# Patient Record
Sex: MALE | Race: WHITE | NOT HISPANIC OR LATINO | Employment: FULL TIME | URBAN - METROPOLITAN AREA
[De-identification: names, ages, dates, MRNs, and addresses within clinical notes are randomized per-mention and may not be internally consistent; named-entity substitution may affect disease eponyms.]

---

## 2018-01-21 ENCOUNTER — APPOINTMENT (OUTPATIENT)
Dept: URGENT CARE | Facility: CLINIC | Age: 63
End: 2018-01-21
Payer: COMMERCIAL

## 2018-01-21 PROCEDURE — S9088 SERVICES PROVIDED IN URGENT: HCPCS

## 2018-01-21 PROCEDURE — 99203 OFFICE O/P NEW LOW 30 MIN: CPT

## 2018-08-14 ENCOUNTER — TRANSCRIBE ORDERS (OUTPATIENT)
Dept: ADMINISTRATIVE | Facility: HOSPITAL | Age: 63
End: 2018-08-14

## 2018-08-14 ENCOUNTER — APPOINTMENT (OUTPATIENT)
Dept: LAB | Facility: HOSPITAL | Age: 63
End: 2018-08-14
Attending: INTERNAL MEDICINE
Payer: COMMERCIAL

## 2018-08-14 DIAGNOSIS — Z79.1 ENCOUNTER FOR LONG-TERM (CURRENT) USE OF NON-STEROIDAL ANTI-INFLAMMATORIES: Primary | ICD-10-CM

## 2018-08-14 DIAGNOSIS — N42.9 DISORDER OF PROSTATE: ICD-10-CM

## 2018-08-14 DIAGNOSIS — Z79.1 ENCOUNTER FOR LONG-TERM (CURRENT) USE OF NON-STEROIDAL ANTI-INFLAMMATORIES: ICD-10-CM

## 2018-08-14 LAB
ALBUMIN SERPL BCP-MCNC: 4.1 G/DL (ref 3.5–5.7)
ALP SERPL-CCNC: 55 U/L (ref 55–165)
ALT SERPL W P-5'-P-CCNC: 20 U/L (ref 7–52)
ANION GAP SERPL CALCULATED.3IONS-SCNC: 8 MMOL/L (ref 4–13)
AST SERPL W P-5'-P-CCNC: 26 U/L (ref 13–39)
BILIRUB SERPL-MCNC: 0.7 MG/DL (ref 0.2–1)
BUN SERPL-MCNC: 23 MG/DL (ref 7–25)
CALCIUM SERPL-MCNC: 9.3 MG/DL (ref 8.6–10.5)
CHLORIDE SERPL-SCNC: 103 MMOL/L (ref 98–107)
CO2 SERPL-SCNC: 28 MMOL/L (ref 21–31)
CREAT SERPL-MCNC: 1.06 MG/DL (ref 0.7–1.3)
ERYTHROCYTE [DISTWIDTH] IN BLOOD BY AUTOMATED COUNT: 13.3 % (ref 11.6–15.1)
GFR SERPL CREATININE-BSD FRML MDRD: 73 ML/MIN/1.73SQ M
GLUCOSE SERPL-MCNC: 105 MG/DL (ref 65–140)
HCT VFR BLD AUTO: 47.6 % (ref 42–52)
HGB BLD-MCNC: 15.7 G/DL (ref 12–17)
MCH RBC QN AUTO: 30.1 PG (ref 26.8–34.3)
MCHC RBC AUTO-ENTMCNC: 33 G/DL (ref 31.4–37.4)
MCV RBC AUTO: 91 FL (ref 82–98)
PLATELET # BLD AUTO: 244 THOUSANDS/UL (ref 149–390)
PMV BLD AUTO: 8.7 FL (ref 8.9–12.7)
POTASSIUM SERPL-SCNC: 4.6 MMOL/L (ref 3.5–5.5)
PROT SERPL-MCNC: 6.3 G/DL (ref 6.4–8.9)
PSA SERPL-MCNC: 0.7 NG/ML (ref 0–4)
RBC # BLD AUTO: 5.21 MILLION/UL (ref 3.88–5.62)
SODIUM SERPL-SCNC: 139 MMOL/L (ref 134–143)
WBC # BLD AUTO: 8.2 THOUSAND/UL (ref 4.31–10.16)

## 2018-08-14 PROCEDURE — 36415 COLL VENOUS BLD VENIPUNCTURE: CPT

## 2018-08-14 PROCEDURE — G0103 PSA SCREENING: HCPCS

## 2018-08-14 PROCEDURE — 85027 COMPLETE CBC AUTOMATED: CPT

## 2018-08-14 PROCEDURE — 80053 COMPREHEN METABOLIC PANEL: CPT

## 2020-08-10 ENCOUNTER — TRANSCRIBE ORDERS (OUTPATIENT)
Dept: LAB | Facility: CLINIC | Age: 65
End: 2020-08-10

## 2020-08-10 ENCOUNTER — APPOINTMENT (OUTPATIENT)
Dept: LAB | Facility: CLINIC | Age: 65
End: 2020-08-10
Payer: MEDICARE

## 2020-08-10 DIAGNOSIS — E78.5 HYPERLIPIDEMIA, UNSPECIFIED HYPERLIPIDEMIA TYPE: ICD-10-CM

## 2020-08-10 DIAGNOSIS — N42.9 PROSTATE DISORDER: ICD-10-CM

## 2020-08-10 DIAGNOSIS — R53.83 FATIGUE, UNSPECIFIED TYPE: ICD-10-CM

## 2020-08-10 DIAGNOSIS — Z79.1 ENCOUNTER FOR LONG-TERM (CURRENT) USE OF NON-STEROIDAL ANTI-INFLAMMATORIES: Primary | ICD-10-CM

## 2020-08-10 DIAGNOSIS — Z79.1 ENCOUNTER FOR LONG-TERM (CURRENT) USE OF NON-STEROIDAL ANTI-INFLAMMATORIES: ICD-10-CM

## 2020-08-10 LAB
ALBUMIN SERPL BCP-MCNC: 3.9 G/DL (ref 3.5–5)
ALP SERPL-CCNC: 44 U/L (ref 46–116)
ALT SERPL W P-5'-P-CCNC: 20 U/L (ref 12–78)
ANION GAP SERPL CALCULATED.3IONS-SCNC: 4 MMOL/L (ref 4–13)
AST SERPL W P-5'-P-CCNC: 24 U/L (ref 5–45)
BILIRUB SERPL-MCNC: 0.76 MG/DL (ref 0.2–1)
BUN SERPL-MCNC: 28 MG/DL (ref 5–25)
CALCIUM SERPL-MCNC: 8.9 MG/DL (ref 8.3–10.1)
CHLORIDE SERPL-SCNC: 108 MMOL/L (ref 100–108)
CHOLEST SERPL-MCNC: 147 MG/DL (ref 50–200)
CO2 SERPL-SCNC: 28 MMOL/L (ref 21–32)
CREAT SERPL-MCNC: 1.28 MG/DL (ref 0.6–1.3)
ERYTHROCYTE [DISTWIDTH] IN BLOOD BY AUTOMATED COUNT: 12.9 % (ref 11.6–15.1)
GFR SERPL CREATININE-BSD FRML MDRD: 58 ML/MIN/1.73SQ M
GLUCOSE P FAST SERPL-MCNC: 103 MG/DL (ref 65–99)
HCT VFR BLD AUTO: 47.8 % (ref 36.5–49.3)
HDLC SERPL-MCNC: 66 MG/DL
HGB BLD-MCNC: 15.4 G/DL (ref 12–17)
LDLC SERPL CALC-MCNC: 66 MG/DL (ref 0–100)
MCH RBC QN AUTO: 30.1 PG (ref 26.8–34.3)
MCHC RBC AUTO-ENTMCNC: 32.2 G/DL (ref 31.4–37.4)
MCV RBC AUTO: 93 FL (ref 82–98)
NONHDLC SERPL-MCNC: 81 MG/DL
PLATELET # BLD AUTO: 237 THOUSANDS/UL (ref 149–390)
PMV BLD AUTO: 10.5 FL (ref 8.9–12.7)
POTASSIUM SERPL-SCNC: 4.3 MMOL/L (ref 3.5–5.3)
PROT SERPL-MCNC: 7.2 G/DL (ref 6.4–8.2)
PSA SERPL-MCNC: 0.3 NG/ML (ref 0–4)
RBC # BLD AUTO: 5.12 MILLION/UL (ref 3.88–5.62)
SODIUM SERPL-SCNC: 140 MMOL/L (ref 136–145)
T4 FREE SERPL-MCNC: 1.15 NG/DL (ref 0.76–1.46)
TRIGL SERPL-MCNC: 75 MG/DL
TSH SERPL DL<=0.05 MIU/L-ACNC: 1.42 UIU/ML (ref 0.36–3.74)
WBC # BLD AUTO: 9.46 THOUSAND/UL (ref 4.31–10.16)

## 2020-08-10 PROCEDURE — 80053 COMPREHEN METABOLIC PANEL: CPT

## 2020-08-10 PROCEDURE — 85027 COMPLETE CBC AUTOMATED: CPT

## 2020-08-10 PROCEDURE — 84443 ASSAY THYROID STIM HORMONE: CPT

## 2020-08-10 PROCEDURE — 84153 ASSAY OF PSA TOTAL: CPT

## 2020-08-10 PROCEDURE — 36415 COLL VENOUS BLD VENIPUNCTURE: CPT

## 2020-08-10 PROCEDURE — 80061 LIPID PANEL: CPT

## 2020-08-10 PROCEDURE — 84439 ASSAY OF FREE THYROXINE: CPT

## 2020-12-07 ENCOUNTER — OFFICE VISIT (OUTPATIENT)
Dept: URGENT CARE | Facility: CLINIC | Age: 65
End: 2020-12-07
Payer: MEDICARE

## 2020-12-07 VITALS
WEIGHT: 160 LBS | HEART RATE: 62 BPM | BODY MASS INDEX: 24.25 KG/M2 | RESPIRATION RATE: 18 BRPM | TEMPERATURE: 97.4 F | HEIGHT: 68 IN | OXYGEN SATURATION: 98 %

## 2020-12-07 DIAGNOSIS — R53.83 FATIGUE, UNSPECIFIED TYPE: Primary | ICD-10-CM

## 2020-12-07 PROCEDURE — 99213 OFFICE O/P EST LOW 20 MIN: CPT | Performed by: PHYSICIAN ASSISTANT

## 2020-12-07 PROCEDURE — G0463 HOSPITAL OUTPT CLINIC VISIT: HCPCS | Performed by: PHYSICIAN ASSISTANT

## 2020-12-07 PROCEDURE — 87637 SARSCOV2&INF A&B&RSV AMP PRB: CPT | Performed by: PHYSICIAN ASSISTANT

## 2020-12-07 RX ORDER — DEXTROAMPHETAMINE SACCHARATE, AMPHETAMINE ASPARTATE, DEXTROAMPHETAMINE SULFATE AND AMPHETAMINE SULFATE 7.5; 7.5; 7.5; 7.5 MG/1; MG/1; MG/1; MG/1
20 TABLET ORAL 2 TIMES DAILY
COMMUNITY
End: 2021-10-14

## 2020-12-07 RX ORDER — VALACYCLOVIR HYDROCHLORIDE 1 G/1
1 TABLET, FILM COATED ORAL 3 TIMES DAILY
COMMUNITY
Start: 2018-01-21 | End: 2021-04-23

## 2020-12-09 LAB
FLUAV RNA NPH QL NAA+PROBE: NOT DETECTED
FLUBV RNA NPH QL NAA+PROBE: NOT DETECTED
RSV RNA NPH QL NAA+PROBE: NOT DETECTED
SARS-COV-2 RNA NPH QL NAA+PROBE: DETECTED

## 2021-01-22 ENCOUNTER — TRANSCRIBE ORDERS (OUTPATIENT)
Dept: LAB | Facility: CLINIC | Age: 66
End: 2021-01-22

## 2021-01-22 ENCOUNTER — LAB (OUTPATIENT)
Dept: LAB | Facility: CLINIC | Age: 66
End: 2021-01-22
Payer: MEDICARE

## 2021-01-22 DIAGNOSIS — N28.9 KIDNEY DISEASE: Primary | ICD-10-CM

## 2021-01-22 DIAGNOSIS — N28.9 KIDNEY DISEASE: ICD-10-CM

## 2021-01-22 LAB
ANION GAP SERPL CALCULATED.3IONS-SCNC: 2 MMOL/L (ref 4–13)
BUN SERPL-MCNC: 33 MG/DL (ref 5–25)
CALCIUM SERPL-MCNC: 9.1 MG/DL (ref 8.3–10.1)
CHLORIDE SERPL-SCNC: 104 MMOL/L (ref 100–108)
CO2 SERPL-SCNC: 32 MMOL/L (ref 21–32)
CREAT SERPL-MCNC: 1.85 MG/DL (ref 0.6–1.3)
GFR SERPL CREATININE-BSD FRML MDRD: 37 ML/MIN/1.73SQ M
GLUCOSE SERPL-MCNC: 72 MG/DL (ref 65–140)
POTASSIUM SERPL-SCNC: 4.3 MMOL/L (ref 3.5–5.3)
SODIUM SERPL-SCNC: 138 MMOL/L (ref 136–145)

## 2021-01-22 PROCEDURE — 80048 BASIC METABOLIC PNL TOTAL CA: CPT

## 2021-01-22 PROCEDURE — 36415 COLL VENOUS BLD VENIPUNCTURE: CPT

## 2021-02-25 ENCOUNTER — TRANSCRIBE ORDERS (OUTPATIENT)
Dept: ADMINISTRATIVE | Facility: HOSPITAL | Age: 66
End: 2021-02-25

## 2021-02-25 DIAGNOSIS — N17.9 ACUTE RENAL FAILURE (HCC): Primary | ICD-10-CM

## 2021-02-26 ENCOUNTER — CONSULT (OUTPATIENT)
Dept: NEPHROLOGY | Facility: CLINIC | Age: 66
End: 2021-02-26
Payer: MEDICARE

## 2021-02-26 ENCOUNTER — HOSPITAL ENCOUNTER (OUTPATIENT)
Dept: ULTRASOUND IMAGING | Facility: HOSPITAL | Age: 66
Discharge: HOME | End: 2021-02-26
Attending: INTERNAL MEDICINE
Payer: MEDICARE

## 2021-02-26 VITALS
HEART RATE: 66 BPM | BODY MASS INDEX: 24.25 KG/M2 | HEIGHT: 68 IN | DIASTOLIC BLOOD PRESSURE: 84 MMHG | SYSTOLIC BLOOD PRESSURE: 154 MMHG | WEIGHT: 160 LBS | OXYGEN SATURATION: 98 %

## 2021-02-26 DIAGNOSIS — N17.9 ACUTE RENAL FAILURE (HCC): ICD-10-CM

## 2021-02-26 DIAGNOSIS — R63.5 WEIGHT GAIN: ICD-10-CM

## 2021-02-26 DIAGNOSIS — N18.2 STAGE 2 CHRONIC KIDNEY DISEASE: ICD-10-CM

## 2021-02-26 DIAGNOSIS — N17.9 ACUTE RENAL FAILURE, UNSPECIFIED ACUTE RENAL FAILURE TYPE (HCC): Primary | ICD-10-CM

## 2021-02-26 PROCEDURE — 99214 OFFICE O/P EST MOD 30 MIN: CPT | Performed by: INTERNAL MEDICINE

## 2021-02-26 PROCEDURE — 76770 US EXAM ABDO BACK WALL COMP: CPT

## 2021-02-26 NOTE — ASSESSMENT & PLAN NOTE
Lab Results   Component Value Date    EGFR 37 01/22/2021    EGFR 58 08/10/2020    EGFR 73 08/14/2018    CREATININE 1 85 (H) 01/22/2021    CREATININE 1 28 08/10/2020    CREATININE 1 06 08/14/2018   He had a  progressive rise in creatinine over the past 2 years, however, he was taking  Amino acid supplements and creatine at that time as well as a high protein diet

## 2021-02-26 NOTE — ASSESSMENT & PLAN NOTE
He had a rise in his creatinine and drop in GFR since last year  He says he was not taking cratine supplements  He drinks probably too much water yet his urine  Has been darker for the lst 2 days  He is on Adderall which can constrict renal vessels     Will check a kidney ultrasound and bladder ultrasound   A PSA has been ordered  Will check urine protein studies and a UA   Check a urine osmolality in view of high water intake to see if he appropriately dilutes his urine He will stop all supplements and avoid creatinine and amino acids at this time

## 2021-03-02 ENCOUNTER — TELEPHONE (OUTPATIENT)
Dept: UROLOGY | Facility: MEDICAL CENTER | Age: 66
End: 2021-03-02

## 2021-03-02 NOTE — TELEPHONE ENCOUNTER
Call placed to Xiomara Raymundo at Dr Ayan [facility] office and spoke with her  Offered an appointment today at 11am with Dr Lior Raymundo accepted this appointment and will contact the patient to inform him

## 2021-03-02 NOTE — TELEPHONE ENCOUNTER
Aston Beltran from [facility] office called back and stated patient declined appointment   Canceled per request   fyi

## 2021-03-02 NOTE — TELEPHONE ENCOUNTER
NPT Please Triage    Ava from Dr Oralia Jimenez office calling to get a STAT appointment for patient  Please triage  Patient needs appointment for  Moderate bilateral hydronephrosis with significant post void residual urinary bladder volume of 978 5 mL    Please review ultrasound

## 2021-03-04 ENCOUNTER — TRANSCRIBE ORDERS (OUTPATIENT)
Dept: ADMINISTRATIVE | Facility: HOSPITAL | Age: 66
End: 2021-03-04

## 2021-03-04 ENCOUNTER — LAB REQUISITION (OUTPATIENT)
Dept: LAB | Facility: HOSPITAL | Age: 66
End: 2021-03-04
Payer: MEDICARE

## 2021-03-04 DIAGNOSIS — N13.30 UNSPECIFIED HYDRONEPHROSIS: Primary | ICD-10-CM

## 2021-03-04 DIAGNOSIS — N39.0 URINARY TRACT INFECTION, SITE NOT SPECIFIED: ICD-10-CM

## 2021-03-04 PROCEDURE — 81001 URINALYSIS AUTO W/SCOPE: CPT | Performed by: UROLOGY

## 2021-03-04 PROCEDURE — 87086 URINE CULTURE/COLONY COUNT: CPT | Performed by: UROLOGY

## 2021-03-05 ENCOUNTER — HOSPITAL ENCOUNTER (EMERGENCY)
Facility: HOSPITAL | Age: 66
Discharge: HOME | End: 2021-03-05
Attending: EMERGENCY MEDICINE | Admitting: EMERGENCY MEDICINE
Payer: MEDICARE

## 2021-03-05 VITALS
TEMPERATURE: 98 F | HEIGHT: 68 IN | WEIGHT: 160 LBS | SYSTOLIC BLOOD PRESSURE: 164 MMHG | HEART RATE: 98 BPM | DIASTOLIC BLOOD PRESSURE: 98 MMHG | RESPIRATION RATE: 18 BRPM | OXYGEN SATURATION: 99 % | BODY MASS INDEX: 24.25 KG/M2

## 2021-03-05 DIAGNOSIS — T83.9XXA PROBLEM WITH FOLEY CATHETER, INITIAL ENCOUNTER (HCC): Primary | ICD-10-CM

## 2021-03-05 LAB
ANION GAP SERPL CALCULATED.3IONS-SCNC: 8 MMOL/L (ref 4–13)
BACTERIA UR QL AUTO: ABNORMAL /HPF
BILIRUB UR QL STRIP: NEGATIVE
BUN SERPL-MCNC: 12 MG/DL (ref 7–25)
CALCIUM SERPL-MCNC: 10 MG/DL (ref 8.6–10.5)
CHLORIDE SERPL-SCNC: 97 MMOL/L (ref 98–107)
CLARITY UR: ABNORMAL
CO2 SERPL-SCNC: 27 MMOL/L (ref 21–31)
COLOR UR: ABNORMAL
CREAT SERPL-MCNC: 1.91 MG/DL (ref 0.7–1.3)
GFR SERPL CREATININE-BSD FRML MDRD: 35 ML/MIN/1.73SQ M
GLUCOSE SERPL-MCNC: 112 MG/DL (ref 65–99)
GLUCOSE UR STRIP-MCNC: NEGATIVE MG/DL
HGB UR QL STRIP.AUTO: ABNORMAL
KETONES UR STRIP-MCNC: NEGATIVE MG/DL
LEUKOCYTE ESTERASE UR QL STRIP: ABNORMAL
NITRITE UR QL STRIP: NEGATIVE
NON-SQ EPI CELLS URNS QL MICRO: ABNORMAL /HPF
PH UR STRIP.AUTO: 6.5 [PH]
POTASSIUM SERPL-SCNC: 4 MMOL/L (ref 3.5–5.5)
PROT UR STRIP-MCNC: ABNORMAL MG/DL
RBC #/AREA URNS AUTO: ABNORMAL /HPF
SODIUM SERPL-SCNC: 132 MMOL/L (ref 134–143)
SP GR UR STRIP.AUTO: 1.01 (ref 1–1.03)
UROBILINOGEN UR QL STRIP.AUTO: 0.2 E.U./DL
WBC #/AREA URNS AUTO: ABNORMAL /HPF

## 2021-03-05 PROCEDURE — 80048 BASIC METABOLIC PNL TOTAL CA: CPT | Performed by: PHYSICIAN ASSISTANT

## 2021-03-05 PROCEDURE — 84154 ASSAY OF PSA FREE: CPT | Performed by: PHYSICIAN ASSISTANT

## 2021-03-05 PROCEDURE — 99283 EMERGENCY DEPT VISIT LOW MDM: CPT

## 2021-03-05 PROCEDURE — 99284 EMERGENCY DEPT VISIT MOD MDM: CPT | Performed by: PHYSICIAN ASSISTANT

## 2021-03-05 PROCEDURE — 36415 COLL VENOUS BLD VENIPUNCTURE: CPT | Performed by: PHYSICIAN ASSISTANT

## 2021-03-05 PROCEDURE — 84153 ASSAY OF PSA TOTAL: CPT | Performed by: PHYSICIAN ASSISTANT

## 2021-03-05 RX ORDER — ATROPA BELLADONNA AND OPIUM 16.2; 3 MG/1; MG/1
30 SUPPOSITORY RECTAL EVERY 8 HOURS PRN
Status: DISCONTINUED | OUTPATIENT
Start: 2021-03-05 | End: 2021-03-05 | Stop reason: HOSPADM

## 2021-03-05 RX ORDER — OXYBUTYNIN CHLORIDE 5 MG/1
15 TABLET, EXTENDED RELEASE ORAL DAILY
Status: DISCONTINUED | OUTPATIENT
Start: 2021-03-05 | End: 2021-03-05 | Stop reason: HOSPADM

## 2021-03-05 RX ORDER — OXYBUTYNIN CHLORIDE 15 MG/1
15 TABLET, EXTENDED RELEASE ORAL
Qty: 14 TABLET | Refills: 0 | Status: SHIPPED | OUTPATIENT
Start: 2021-03-05 | End: 2021-04-23

## 2021-03-05 RX ADMIN — ATROPA BELLADONNA AND OPIUM 1 SUPPOSITORY: 16.2; 3 SUPPOSITORY RECTAL at 11:34

## 2021-03-05 RX ADMIN — OXYBUTYNIN 15 MG: 5 TABLET, FILM COATED, EXTENDED RELEASE ORAL at 11:34

## 2021-03-05 NOTE — ED ATTENDING ATTESTATION
3/5/2021  IOswaldo III, DO, saw and evaluated the patient  I have discussed the patient with the resident/non-physician practitioner and agree with the resident's/non-physician practitioner's findings, Plan of Care, and MDM as documented in the resident's/non-physician practitioner's note, except where noted  All available labs and Radiology studies were reviewed  I was present for key portions of any procedure(s) performed by the resident/non-physician practitioner and I was immediately available to provide assistance  At this point I agree with the current assessment done in the Emergency Department  I have conducted an independent evaluation of this patient a history and physical is as follows:    ED Course         Critical Care Time  Procedures    Please see ED course for specifics for the physician assistant entry, this is a 78-year-old gentle presents the emergency department with a catheter that was placed yesterday secondary to enlarged prostate  Patient was having significant amount of pain and he called his urologist Dr Rona Olvera and instructed him to come the emergency department  Physician assistant discussed the case at length with Dr Aleida Rojas the patient should not have the catheter removed because he has an enlarged prostate and history of renal disease in taking the catheter out it may precipitate acute renal failure  Patient was adamant about catheter to be removed, patient eventually agreed to admit follow-up with Urology  Agree the patient will have a [email or web address] suppository insert prior to discharge, patient will have his  come and pick him up in the hospital   Follow-up with urology as directed

## 2021-03-05 NOTE — ED PROVIDER NOTES
History  Chief Complaint   Patient presents with    Urinary Catheter Problem     had catheter placed d/t enlarged prostate yesterday , now having pain and blood , states Dr Rona Olvera told him to come in, pt would like it removed        57-year-old male presents emergency department with concern for discomfort associated with his recently placed urinary catheter  Urinary catheter was replaced by his urologist due to significant urinary retention and bilateral hydronephrosis with evidence of renal injury  Patient reports that the catheter is very uncomfortable causing him inability to sit in his chair home and to not be able to sleep he is requesting the catheter be removed  There is some hematuria noted in the Ordonez bag however this is likely normal given recent Ordonez placement  Allergies reviewed          Prior to Admission Medications   Prescriptions Last Dose Informant Patient Reported? Taking? amphetamine-dextroamphetamine (ADDERALL, 30MG,) 30 MG tablet 3/5/2021 at Unknown time  Yes Yes   Sig: Take 10 mg by mouth 10mg tablets TID   valACYclovir (VALTREX) 1,000 mg tablet Not Taking at Unknown time  Yes No   Sig: Take 1 tablet by mouth 3 (three) times a day      Facility-Administered Medications: None       Past Medical History:   Diagnosis Date    Renal disorder        History reviewed  No pertinent surgical history  History reviewed  No pertinent family history  I have reviewed and agree with the history as documented  E-Cigarette/Vaping     E-Cigarette/Vaping Substances     Social History     Tobacco Use    Smoking status: Former Smoker    Smokeless tobacco: Never Used   Substance Use Topics    Alcohol use: Never     Frequency: Never    Drug use: Never       Review of Systems   Constitutional: Negative for chills, fatigue and fever  HENT: Negative for congestion, ear pain, rhinorrhea, sinus pressure, sneezing and sore throat  Eyes: Negative for pain and discharge     Respiratory: Negative for cough, choking, chest tightness, shortness of breath and wheezing  Cardiovascular: Negative for chest pain and palpitations  Gastrointestinal: Negative for abdominal pain, constipation, diarrhea, nausea and vomiting  Genitourinary: Negative for difficulty urinating and dysuria  Musculoskeletal: Negative for back pain, gait problem, neck pain and neck stiffness  Neurological: Negative for dizziness, light-headedness and headaches  All other systems reviewed and are negative  Physical Exam  Physical Exam  Vitals signs and nursing note reviewed  Constitutional:       General: He is not in acute distress  Appearance: Normal appearance  He is well-developed  He is not ill-appearing  HENT:      Head: Normocephalic and atraumatic  Right Ear: External ear normal       Left Ear: External ear normal       Nose: Nose normal    Eyes:      Pupils: Pupils are equal, round, and reactive to light  Neck:      Musculoskeletal: Normal range of motion and neck supple  Cardiovascular:      Rate and Rhythm: Normal rate and regular rhythm  Heart sounds: Normal heart sounds  No murmur  No friction rub  No gallop  Pulmonary:      Effort: Pulmonary effort is normal  No respiratory distress  Breath sounds: Normal breath sounds  No stridor  No wheezing or rales  Abdominal:      General: Bowel sounds are normal  There is no distension  Palpations: Abdomen is soft  Tenderness: There is no abdominal tenderness  There is no guarding  Genitourinary:     Penis: Normal and circumcised  Comments:  Ordonez catheter in place  Hematuria noted  Musculoskeletal: Normal range of motion  General: No tenderness  Skin:     General: Skin is warm  Capillary Refill: Capillary refill takes less than 2 seconds  Neurological:      Mental Status: He is alert and oriented to person, place, and time  Psychiatric:         Behavior: Behavior is cooperative           Vital Signs  ED Triage Vitals [03/05/21 0943]   Temperature Pulse Respirations Blood Pressure SpO2   98 °F (36 7 °C) 98 18 164/98 99 %      Temp src Heart Rate Source Patient Position - Orthostatic VS BP Location FiO2 (%)   -- Monitor -- -- --      Pain Score       9           Vitals:    03/05/21 0943   BP: 164/98   Pulse: 98         Visual Acuity      ED Medications  Medications - No data to display    Diagnostic Studies  Results Reviewed     Procedure Component Value Units Date/Time    Basic metabolic panel [[id number]]  (Abnormal) Collected: 03/05/21 1132    Lab Status: Final result Specimen: Blood from Arm, Right Updated: 03/05/21 1156     Sodium 132 mmol/L      Potassium 4 0 mmol/L      Chloride 97 mmol/L      CO2 27 mmol/L      ANION GAP 8 mmol/L      BUN 12 mg/dL      Creatinine 1 91 mg/dL      Glucose 112 mg/dL      Calcium 10 0 mg/dL      eGFR 35 ml/min/1 73sq m     Narrative:      [place] guidelines for Chronic Kidney Disease (CKD):     Stage 1 with normal or high GFR (GFR > 90 mL/min/1 73 square meters)    Stage 2 Mild CKD (GFR = 60-89 mL/min/1 73 square meters)    Stage 3A Moderate CKD (GFR = 45-59 mL/min/1 73 square meters)    Stage 3B Moderate CKD (GFR = 30-44 mL/min/1 73 square meters)    Stage 4 Severe CKD (GFR = 15-29 mL/min/1 73 square meters)    Stage 5 End Stage CKD (GFR <15 mL/min/1 73 square meters)  Note: GFR calculation is accurate only with a steady state creatinine    PSA, total and free [[id number]] Collected: 03/05/21 1133    Lab Status:  In process Specimen: Blood from Arm, Right Updated: 03/05/21 1134                 No orders to display              Procedures  Procedures         ED Course                                           MDM  Number of Diagnoses or Management Options  Problem with Ordonez catheter, initial encounter [facility]):   Diagnosis management comments:  Case was discussed with Dr Seldon Collet of Urology who also  Spoke with the patient while he was in the emergency department via telephone  Patient agreeable to maintain Ordonez catheter placement  Patient given B and O suppository as well as oxybutynin per Dr Rowell January request   Lab sent per Dr Rowell January request as well  Patient is following with Urology closely regarding his renal failure and urinary retention  Patient educated regarding their diagnosis and given return and follow-up instructions  Patient was advised to returned to the ED with worsening symptoms or concerns  Patient is understanding of and in agreement with the treatment plan  There are no questions at the time of discharge  Amount and/or Complexity of Data Reviewed  Clinical lab tests: ordered and reviewed    Risk of Complications, Morbidity, and/or Mortality  Presenting problems: moderate  Diagnostic procedures: low  Management options: low    Patient Progress  Patient progress: stable      Disposition  Final diagnoses:   Problem with Ordonez catheter, initial encounter [facility])     Time reflects when diagnosis was documented in both MDM as applicable and the Disposition within this note     Time User Action Codes Description Comment    3/5/2021 11:38 AM Indra Hughes 469  9XXA] Problem with Ordonez catheter, initial encounter [facility])       ED Disposition     ED Disposition Condition Date/Time Comment    Discharge Stable Fri Mar 5, 2021 11:38 AM Leonora Perez discharge to home/self care              Follow-up Information     Follow up With Specialties Details Why Jose Trimble MD Urology   [address]   [phone]            Discharge Medication List as of 3/5/2021 12:07 PM      START taking these medications    Details   oxybutynin (DITROPAN XL) 15 MG 24 hr tablet Take 1 tablet (15 mg total) by mouth daily at bedtime, Starting Fri 3/5/2021, Normal         CONTINUE these medications which have NOT CHANGED    Details   amphetamine-dextroamphetamine (ADDERALL, 30MG,) 30 MG tablet Take 10 mg by mouth 10mg tablets TID, Historical Med      valACYclovir (VALTREX) 1,000 mg tablet Take 1 tablet by mouth 3 (three) times a day, Starting Sun 1/21/2018, Historical Med           No discharge procedures on file      PDMP Review     None          ED Provider  Electronically Signed by           April Hendrickson PA-C  03/05/21 9189

## 2021-03-07 LAB
BACTERIA UR CULT: ABNORMAL
PSA FREE MFR SERPL: 10 %
PSA FREE SERPL-MCNC: 0.03 NG/ML
PSA SERPL-MCNC: 0.3 NG/ML (ref 0–4)

## 2021-03-08 ENCOUNTER — TRANSCRIBE ORDERS (OUTPATIENT)
Dept: LAB | Facility: CLINIC | Age: 66
End: 2021-03-08

## 2021-03-08 ENCOUNTER — LAB (OUTPATIENT)
Dept: LAB | Facility: CLINIC | Age: 66
End: 2021-03-08
Payer: MEDICARE

## 2021-03-08 DIAGNOSIS — R33.9 RETENTION OF URINE, UNSPECIFIED: ICD-10-CM

## 2021-03-08 DIAGNOSIS — N13.8 ENLARGED PROSTATE WITH URINARY OBSTRUCTION: ICD-10-CM

## 2021-03-08 DIAGNOSIS — N17.9 ACUTE RENAL FAILURE, UNSPECIFIED ACUTE RENAL FAILURE TYPE (HCC): ICD-10-CM

## 2021-03-08 DIAGNOSIS — N18.2 STAGE 2 CHRONIC KIDNEY DISEASE: ICD-10-CM

## 2021-03-08 DIAGNOSIS — R35.89 POLYURIA: ICD-10-CM

## 2021-03-08 DIAGNOSIS — R63.5 WEIGHT GAIN: ICD-10-CM

## 2021-03-08 DIAGNOSIS — N40.1 ENLARGED PROSTATE WITH URINARY OBSTRUCTION: ICD-10-CM

## 2021-03-08 DIAGNOSIS — R35.89 POLYURIA: Primary | ICD-10-CM

## 2021-03-08 LAB
ALBUMIN SERPL BCP-MCNC: 3.3 G/DL (ref 3.5–5)
ALP SERPL-CCNC: 68 U/L (ref 46–116)
ALT SERPL W P-5'-P-CCNC: 17 U/L (ref 12–78)
ANION GAP SERPL CALCULATED.3IONS-SCNC: 6 MMOL/L (ref 4–13)
AST SERPL W P-5'-P-CCNC: 21 U/L (ref 5–45)
BACTERIA UR QL AUTO: ABNORMAL /HPF
BASOPHILS # BLD AUTO: 0.08 THOUSANDS/ΜL (ref 0–0.1)
BASOPHILS NFR BLD AUTO: 1 % (ref 0–1)
BILIRUB SERPL-MCNC: 0.38 MG/DL (ref 0.2–1)
BILIRUB UR QL STRIP: NEGATIVE
BUN SERPL-MCNC: 21 MG/DL (ref 5–25)
CALCIUM ALBUM COR SERPL-MCNC: 9.4 MG/DL (ref 8.3–10.1)
CALCIUM SERPL-MCNC: 8.8 MG/DL (ref 8.3–10.1)
CHLORIDE SERPL-SCNC: 99 MMOL/L (ref 100–108)
CLARITY UR: ABNORMAL
CO2 SERPL-SCNC: 31 MMOL/L (ref 21–32)
COLOR UR: ABNORMAL
CREAT SERPL-MCNC: 2 MG/DL (ref 0.6–1.3)
CREAT UR-MCNC: 133 MG/DL
EOSINOPHIL # BLD AUTO: 0.35 THOUSAND/ΜL (ref 0–0.61)
EOSINOPHIL NFR BLD AUTO: 4 % (ref 0–6)
ERYTHROCYTE [DISTWIDTH] IN BLOOD BY AUTOMATED COUNT: 13 % (ref 11.6–15.1)
GFR SERPL CREATININE-BSD FRML MDRD: 34 ML/MIN/1.73SQ M
GLUCOSE SERPL-MCNC: 113 MG/DL (ref 65–140)
GLUCOSE UR STRIP-MCNC: NEGATIVE MG/DL
HCT VFR BLD AUTO: 44.5 % (ref 36.5–49.3)
HGB BLD-MCNC: 14.3 G/DL (ref 12–17)
HGB UR QL STRIP.AUTO: ABNORMAL
IMM GRANULOCYTES # BLD AUTO: 0.04 THOUSAND/UL (ref 0–0.2)
IMM GRANULOCYTES NFR BLD AUTO: 0 % (ref 0–2)
KETONES UR STRIP-MCNC: ABNORMAL MG/DL
LEUKOCYTE ESTERASE UR QL STRIP: ABNORMAL
LYMPHOCYTES # BLD AUTO: 1.55 THOUSANDS/ΜL (ref 0.6–4.47)
LYMPHOCYTES NFR BLD AUTO: 16 % (ref 14–44)
MCH RBC QN AUTO: 28.7 PG (ref 26.8–34.3)
MCHC RBC AUTO-ENTMCNC: 32.1 G/DL (ref 31.4–37.4)
MCV RBC AUTO: 89 FL (ref 82–98)
MICROALBUMIN UR-MCNC: 2090 MG/L (ref 0–20)
MICROALBUMIN/CREAT 24H UR: 1571 MG/G CREATININE (ref 0–30)
MONOCYTES # BLD AUTO: 0.46 THOUSAND/ΜL (ref 0.17–1.22)
MONOCYTES NFR BLD AUTO: 5 % (ref 4–12)
NEUTROPHILS # BLD AUTO: 7.41 THOUSANDS/ΜL (ref 1.85–7.62)
NEUTS SEG NFR BLD AUTO: 74 % (ref 43–75)
NITRITE UR QL STRIP: NEGATIVE
NON-SQ EPI CELLS URNS QL MICRO: ABNORMAL /HPF
NRBC BLD AUTO-RTO: 0 /100 WBCS
OSMOLALITY UR: 378 MMOL/KG
PH UR STRIP.AUTO: 6 [PH]
PLATELET # BLD AUTO: 385 THOUSANDS/UL (ref 149–390)
PMV BLD AUTO: 10.5 FL (ref 8.9–12.7)
POTASSIUM SERPL-SCNC: 4.2 MMOL/L (ref 3.5–5.3)
PROT SERPL-MCNC: 7.5 G/DL (ref 6.4–8.2)
PROT UR STRIP-MCNC: ABNORMAL MG/DL
PROT UR-MCNC: 338 MG/DL
PROT/CREAT UR: 2.54 MG/G{CREAT} (ref 0–0.1)
RBC # BLD AUTO: 4.99 MILLION/UL (ref 3.88–5.62)
RBC #/AREA URNS AUTO: ABNORMAL /HPF
SODIUM 24H UR-SCNC: 14 MOL/L
SODIUM SERPL-SCNC: 136 MMOL/L (ref 136–145)
SP GR UR STRIP.AUTO: 1.01 (ref 1–1.03)
T4 FREE SERPL-MCNC: 1.29 NG/DL (ref 0.76–1.46)
TSH SERPL DL<=0.05 MIU/L-ACNC: 2.56 UIU/ML (ref 0.36–3.74)
URATE SERPL-MCNC: 9.6 MG/DL (ref 4.2–8)
UROBILINOGEN UR QL STRIP.AUTO: 1 E.U./DL
WBC # BLD AUTO: 9.89 THOUSAND/UL (ref 4.31–10.16)
WBC #/AREA URNS AUTO: ABNORMAL /HPF

## 2021-03-08 PROCEDURE — 83935 ASSAY OF URINE OSMOLALITY: CPT | Performed by: INTERNAL MEDICINE

## 2021-03-08 PROCEDURE — 80053 COMPREHEN METABOLIC PANEL: CPT

## 2021-03-08 PROCEDURE — 82570 ASSAY OF URINE CREATININE: CPT | Performed by: INTERNAL MEDICINE

## 2021-03-08 PROCEDURE — 84300 ASSAY OF URINE SODIUM: CPT

## 2021-03-08 PROCEDURE — 81001 URINALYSIS AUTO W/SCOPE: CPT

## 2021-03-08 PROCEDURE — 82043 UR ALBUMIN QUANTITATIVE: CPT | Performed by: INTERNAL MEDICINE

## 2021-03-08 PROCEDURE — 84439 ASSAY OF FREE THYROXINE: CPT

## 2021-03-08 PROCEDURE — 84153 ASSAY OF PSA TOTAL: CPT

## 2021-03-08 PROCEDURE — 84443 ASSAY THYROID STIM HORMONE: CPT

## 2021-03-08 PROCEDURE — 84156 ASSAY OF PROTEIN URINE: CPT

## 2021-03-08 PROCEDURE — 84154 ASSAY OF PSA FREE: CPT

## 2021-03-08 PROCEDURE — 36415 COLL VENOUS BLD VENIPUNCTURE: CPT

## 2021-03-08 PROCEDURE — 85025 COMPLETE CBC W/AUTO DIFF WBC: CPT

## 2021-03-08 PROCEDURE — 84550 ASSAY OF BLOOD/URIC ACID: CPT

## 2021-03-08 PROCEDURE — 82570 ASSAY OF URINE CREATININE: CPT

## 2021-03-09 LAB
PSA FREE MFR SERPL: <6.7 %
PSA FREE SERPL-MCNC: <0.02 NG/ML
PSA SERPL-MCNC: 0.3 NG/ML (ref 0–4)

## 2021-03-11 ENCOUNTER — HOSPITAL ENCOUNTER (OUTPATIENT)
Dept: ULTRASOUND IMAGING | Facility: HOSPITAL | Age: 66
Discharge: HOME | End: 2021-03-11
Attending: UROLOGY
Payer: MEDICARE

## 2021-03-11 DIAGNOSIS — N13.30 UNSPECIFIED HYDRONEPHROSIS: ICD-10-CM

## 2021-03-11 PROCEDURE — 76770 US EXAM ABDO BACK WALL COMP: CPT

## 2021-03-12 ENCOUNTER — OFFICE VISIT (OUTPATIENT)
Dept: NEPHROLOGY | Facility: CLINIC | Age: 66
End: 2021-03-12
Payer: MEDICARE

## 2021-03-12 VITALS
DIASTOLIC BLOOD PRESSURE: 84 MMHG | OXYGEN SATURATION: 99 % | WEIGHT: 160 LBS | BODY MASS INDEX: 24.25 KG/M2 | SYSTOLIC BLOOD PRESSURE: 144 MMHG | HEIGHT: 68 IN | HEART RATE: 84 BPM

## 2021-03-12 DIAGNOSIS — N17.0 ACUTE RENAL FAILURE WITH TUBULAR NECROSIS (HCC): ICD-10-CM

## 2021-03-12 DIAGNOSIS — R80.1 PERSISTENT PROTEINURIA: ICD-10-CM

## 2021-03-12 DIAGNOSIS — N18.2 STAGE 2 CHRONIC KIDNEY DISEASE: ICD-10-CM

## 2021-03-12 DIAGNOSIS — N17.9 ACUTE RENAL FAILURE, UNSPECIFIED ACUTE RENAL FAILURE TYPE (HCC): Primary | ICD-10-CM

## 2021-03-12 DIAGNOSIS — N13.30 HYDRONEPHROSIS, UNSPECIFIED HYDRONEPHROSIS TYPE: ICD-10-CM

## 2021-03-12 PROCEDURE — 99214 OFFICE O/P EST MOD 30 MIN: CPT | Performed by: INTERNAL MEDICINE

## 2021-03-12 RX ORDER — TAMSULOSIN HYDROCHLORIDE 0.4 MG/1
0.4 CAPSULE ORAL
COMMUNITY
Start: 2021-03-01

## 2021-03-12 NOTE — ASSESSMENT & PLAN NOTE
He had marked bilateral hydronephrosis most probablyly due to prostatic enlargement  He had a Ordonez inserted and he had marked drainage  He had a day a prompt diuresis which appears to be more of a postobstructive diuresis and he feels well  He is continuing to utilize a Ordonez  I explained with a neurogenic bladder was and how port was for him to maintain Ordonez drainage  Will check kidney function 2-4 weeks after drainage is complete to see through to resolution  If his kidney function improves then etiology is obstructive uropathy  I explained using a diagram the source of glomerular proteinuria as well as tubular proteinuria  He has both  If his kidney function does not improve to baseline after complete resolution of hydronephrosis would consider a kidney biopsy if  his creatinine continues to stay elevated    I explained the technique of this and he understood

## 2021-03-12 NOTE — PROGRESS NOTES
[facility] [place], [place]    Name: Farzana Ambrocio  YOB: 1955      Assessment/Plan:       Problem List Items Addressed This Visit        Genitourinary    Acute renal failure with tubular necrosis [facility]) - Primary     He had marked bilateral hydronephrosis most probablyly due to prostatic enlargement  He had a Ordonez inserted and he had marked drainage  He had a day a prompt diuresis which appears to be more of a postobstructive diuresis and he feels well  He is continuing to utilize a Ordonez  I explained with a neurogenic bladder was and how port was for him to maintain Ordonez drainage  Will check kidney function 2-4 weeks after drainage is complete to see through to resolution  If his kidney function improves then etiology is obstructive uropathy  I explained using a diagram the source of glomerular proteinuria as well as tubular proteinuria  He has both  If his kidney function does not improve to baseline after complete resolution of hydronephrosis would consider a kidney biopsy if  his creatinine continues to stay elevated  I explained the technique of this and he understood         Stage 2 chronic kidney disease     Lab Results   Component Value Date    EGFR 34 03/08/2021    EGFR 35 03/05/2021    EGFR 37 01/22/2021    CREATININE 2 00 (H) 03/08/2021    CREATININE 1 91 (H) 03/05/2021    CREATININE 1 85 (H) 01/22/2021   He had baseline stage 2 chronic kidney disease in the past before acute decompensation         Hydronephrosis    Relevant Orders    Comprehensive metabolic panel    CBC and differential    Microalbumin / creatinine urine ratio    Protein / creatinine ratio, urine    Uric acid    Urinalysis with microscopic    Sodium With Creatinine, Random Urine       Other    Persistent proteinuria            Subjective:      Patient ID: Farzana Ambrocio is a 77 y o  male      HPI Presented with a creatinine of 1 85 (eGFR 37) which is a decline from Aug 2020 (1 28 GFR 58)  He had urinary retention and had a catheter placed  He says it is very uncomfortable  He had a kidney ultrasound 2/26/21 which demonstrated a markedly distended urinary bladder and moderate hydronephrosis  MAC 1571 PCR 2 654    He had a large diuresis of a liter extra 2 days ago without an increase in fluid intake      The following portions of the patient's history were reviewed and updated as appropriate: allergies, current medications, past family history, past medical history, past social history, past surgical history and problem list     Review of Systems   Constitutional: Negative for appetite change  HENT: Negative for hearing loss  Eyes: Negative for visual disturbance  Respiratory: Negative for cough and shortness of breath  Cardiovascular: Negative for chest pain, palpitations and leg swelling  Gastrointestinal: Negative for abdominal pain and blood in stool     Genitourinary:        Has lower abdominal pressure and has clear urine without blood  He has a giles         Social History     Socioeconomic History    Marital status: Single     Spouse name: None    Number of children: None    Years of education: None    Highest education level: None   Occupational History    None   Social Needs    Financial resource strain: None    Food insecurity     Worry: None     Inability: None    Transportation needs     Medical: None     Non-medical: None   Tobacco Use    Smoking status: Former Smoker    Smokeless tobacco: Never Used   Substance and Sexual Activity    Alcohol use: Never     Frequency: Never    Drug use: Never    Sexual activity: None   Lifestyle    Physical activity     Days per week: None     Minutes per session: None    Stress: None   Relationships    Social connections     Talks on phone: None     Gets together: None     Attends Gnosticism service: None     Active member of club or organization: None     Attends meetings of clubs or organizations: None Relationship status: None    Intimate partner violence     Fear of current or ex partner: None     Emotionally abused: None     Physically abused: None     Forced sexual activity: None   Other Topics Concern    None   Social History Narrative    None     Past Medical History:   Diagnosis Date    Renal disorder      History reviewed  No pertinent surgical history      Current Outpatient Medications:     amphetamine-dextroamphetamine (ADDERALL, 30MG,) 30 MG tablet, Take 10 mg by mouth 10mg tablets TID, Disp: , Rfl:     tamsulosin (FLOMAX) 0 4 mg, take 1 capsule by mouth once daily AT NOON, Disp: , Rfl:     oxybutynin (DITROPAN XL) 15 MG 24 hr tablet, Take 1 tablet (15 mg total) by mouth daily at bedtime (Patient not taking: Reported on 3/12/2021), Disp: 14 tablet, Rfl: 0    valACYclovir (VALTREX) 1,000 mg tablet, Take 1 tablet by mouth 3 (three) times a day, Disp: , Rfl:     Lab Results   Component Value Date    SODIUM 137 03/23/2021    K 4 7 03/23/2021     03/23/2021    CO2 30 03/23/2021    AGAP 1 (L) 03/23/2021    BUN 22 03/23/2021    CREATININE 1 67 (H) 03/23/2021    GLUC 103 03/23/2021    GLUF 103 (H) 08/10/2020    CALCIUM 9 3 03/23/2021    AST 21 03/08/2021    ALT 17 03/08/2021    ALKPHOS 68 03/08/2021    TP 7 5 03/08/2021    TBILI 0 38 03/08/2021    EGFR 42 03/23/2021     Lab Results   Component Value Date    WBC 9 89 03/08/2021    HGB 14 3 03/08/2021    HCT 44 5 03/08/2021    MCV 89 03/08/2021     03/08/2021     Lab Results   Component Value Date    CHOLESTEROL 147 08/10/2020     Lab Results   Component Value Date    HDL 66 08/10/2020     Lab Results   Component Value Date    LDLCALC 66 08/10/2020     Lab Results   Component Value Date    TRIG 75 08/10/2020     No results found for: [place], [place]  Lab Results   Component Value Date    HKB7RFJHVQEA 2 560 03/08/2021     Lab Results   Component Value Date    CALCIUM 9 3 03/23/2021     No results found for: SPEP, UPEP  No results found for: KEKE [id number]        Objective:      /84   Pulse 84   Ht 5' 8" (1 727 m)   Wt 72 6 kg (160 lb)   SpO2 99%   BMI 24 33 kg/m²          Physical Exam  HENT:      Right Ear: External ear normal       Left Ear: External ear normal    Eyes:      Extraocular Movements: Extraocular movements intact  Pupils: Pupils are equal, round, and reactive to light  Cardiovascular:      Rate and Rhythm: Normal rate and regular rhythm  Pulmonary:      Effort: Pulmonary effort is normal       Breath sounds: Normal breath sounds  Abdominal:      General: Bowel sounds are normal  There is no distension  Tenderness: There is no abdominal tenderness  Musculoskeletal:      Right lower leg: No edema  Left lower leg: No edema  Neurological:      General: No focal deficit present  Psychiatric:         Mood and Affect: Mood normal          Behavior: Behavior normal          Thought Content:  Thought content normal          Judgment: Judgment normal

## 2021-03-12 NOTE — ASSESSMENT & PLAN NOTE
Lab Results   Component Value Date    EGFR 34 03/08/2021    EGFR 35 03/05/2021    EGFR 37 01/22/2021    CREATININE 2 00 (H) 03/08/2021    CREATININE 1 91 (H) 03/05/2021    CREATININE 1 85 (H) 01/22/2021   He had baseline stage 2 chronic kidney disease in the past before acute decompensation

## 2021-03-23 ENCOUNTER — APPOINTMENT (OUTPATIENT)
Dept: LAB | Facility: CLINIC | Age: 66
End: 2021-03-23
Payer: MEDICARE

## 2021-03-23 ENCOUNTER — TRANSCRIBE ORDERS (OUTPATIENT)
Dept: LAB | Facility: CLINIC | Age: 66
End: 2021-03-23

## 2021-03-23 DIAGNOSIS — N17.9 ACUTE RENAL FAILURE, UNSPECIFIED ACUTE RENAL FAILURE TYPE (HCC): ICD-10-CM

## 2021-03-23 DIAGNOSIS — N13.30 HYDRONEPHROSIS, UNSPECIFIED HYDRONEPHROSIS TYPE: ICD-10-CM

## 2021-03-23 DIAGNOSIS — R33.9 RETENTION OF URINE, UNSPECIFIED: ICD-10-CM

## 2021-03-23 DIAGNOSIS — R33.9 RETENTION OF URINE, UNSPECIFIED: Primary | ICD-10-CM

## 2021-03-23 LAB
ANION GAP SERPL CALCULATED.3IONS-SCNC: 1 MMOL/L (ref 4–13)
BUN SERPL-MCNC: 22 MG/DL (ref 5–25)
CALCIUM SERPL-MCNC: 9.3 MG/DL (ref 8.3–10.1)
CHLORIDE SERPL-SCNC: 106 MMOL/L (ref 100–108)
CO2 SERPL-SCNC: 30 MMOL/L (ref 21–32)
CREAT SERPL-MCNC: 1.67 MG/DL (ref 0.6–1.3)
GFR SERPL CREATININE-BSD FRML MDRD: 42 ML/MIN/1.73SQ M
GLUCOSE SERPL-MCNC: 103 MG/DL (ref 65–140)
POTASSIUM SERPL-SCNC: 4.7 MMOL/L (ref 3.5–5.3)
SODIUM SERPL-SCNC: 137 MMOL/L (ref 136–145)

## 2021-03-23 PROCEDURE — 36415 COLL VENOUS BLD VENIPUNCTURE: CPT

## 2021-03-23 PROCEDURE — 80048 BASIC METABOLIC PNL TOTAL CA: CPT

## 2021-04-05 NOTE — PROGRESS NOTES
[address] [facility], [place]    Name: Mino Us  YOB: 1955      Assessment/Plan:    Acute renal failure with tubular necrosis [facility])  He had a rise in his creatinine and drop in GFR since last year  He says he was not taking cratine supplements  He drinks probably too much water yet his urine  Has been darker for the lst 2 days  He is on Adderall which can constrict renal vessels  Will check a kidney ultrasound and bladder ultrasound   A PSA has been ordered  Will check urine protein studies and a UA   Check a urine osmolality in view of high water intake to see if he appropriately dilutes his urine He will stop all supplements and avoid creatinine and amino acids at this time    Stage 2 chronic kidney disease  Lab Results   Component Value Date    EGFR 37 01/22/2021    EGFR 58 08/10/2020    EGFR 73 08/14/2018    CREATININE 1 85 (H) 01/22/2021    CREATININE 1 28 08/10/2020    CREATININE 1 06 08/14/2018   He had a  progressive rise in creatinine over the past 2 years, however, he was taking  Amino acid supplements and creatine at that time as well as a high protein diet         Problem List Items Addressed This Visit        Genitourinary    Acute renal failure with tubular necrosis ([address] ) - Primary     He had a rise in his creatinine and drop in GFR since last year  He says he was not taking cratine supplements  He drinks probably too much water yet his urine  Has been darker for the lst 2 days  He is on Adderall which can constrict renal vessels     Will check a kidney ultrasound and bladder ultrasound   A PSA has been ordered  Will check urine protein studies and a UA   Check a urine osmolality in view of high water intake to see if he appropriately dilutes his urine He will stop all supplements and avoid creatinine and amino acids at this time         Stage 2 chronic kidney disease     Lab Results   Component Value Date    EGFR 37 01/22/2021    EGFR 58 08/10/2020    EGFR 73 08/14/2018    CREATININE 1 85 (H) 01/22/2021    CREATININE 1 28 08/10/2020    CREATININE 1 06 08/14/2018   He had a  progressive rise in creatinine over the past 2 years, however, he was taking  Amino acid supplements and creatine at that time as well as a high protein diet         Relevant Orders    CBC and differential (Completed)    Comprehensive metabolic panel (Completed)    Microalbumin / creatinine urine ratio    Protein / creatinine ratio, urine (Completed)    Uric acid (Completed)    Urinalysis with microscopic (Completed)    Osmolality, urine (Completed)      Other Visit Diagnoses     Weight gain                Subjective:      Patient ID: Priyanka Hayden is a 77 y o  male      HPI    The following portions of the patient's history were reviewed and updated as appropriate: allergies, current medications, past family history, past medical history, past social history, past surgical history and problem list     Review of Systems      Social History     Socioeconomic History    Marital status: Single     Spouse name: None    Number of children: None    Years of education: None    Highest education level: None   Occupational History    None   Social Needs    Financial resource strain: None    Food insecurity     Worry: None     Inability: None    Transportation needs     Medical: None     Non-medical: None   Tobacco Use    Smoking status: Former Smoker    Smokeless tobacco: Never Used   Substance and Sexual Activity    Alcohol use: Never     Frequency: Never    Drug use: Never    Sexual activity: None   Lifestyle    Physical activity     Days per week: None     Minutes per session: None    Stress: None   Relationships    Social connections     Talks on phone: None     Gets together: None     Attends [employer] service: None     Active member of club or organization: None     Attends meetings of clubs or organizations: None     Relationship status: None    Intimate partner violence     Fear of current or ex partner: None     Emotionally abused: None     Physically abused: None     Forced sexual activity: None   Other Topics Concern    None   Social History Narrative    None     Past Medical History:   Diagnosis Date    Renal disorder      History reviewed  No pertinent surgical history      Current Outpatient Medications:     amphetamine-dextroamphetamine (ADDERALL, 30MG,) 30 MG tablet, Take 10 mg by mouth 10mg tablets TID, Disp: , Rfl:     oxybutynin (DITROPAN XL) 15 MG 24 hr tablet, Take 1 tablet (15 mg total) by mouth daily at bedtime (Patient not taking: Reported on 3/12/2021), Disp: 14 tablet, Rfl: 0    tamsulosin (FLOMAX) 0 4 mg, take 1 capsule by mouth once daily AT NOON, Disp: , Rfl:     valACYclovir (VALTREX) 1,000 mg tablet, Take 1 tablet by mouth 3 (three) times a day, Disp: , Rfl:     Lab Results   Component Value Date    SODIUM 137 03/23/2021    K 4 7 03/23/2021     03/23/2021    CO2 30 03/23/2021    AGAP 1 (L) 03/23/2021    BUN 22 03/23/2021    CREATININE 1 67 (H) 03/23/2021    GLUC 103 03/23/2021    GLUF 103 (H) 08/10/2020    CALCIUM 9 3 03/23/2021    AST 21 03/08/2021    ALT 17 03/08/2021    ALKPHOS 68 03/08/2021    TP 7 5 03/08/2021    TBILI 0 38 03/08/2021    EGFR 42 03/23/2021     Lab Results   Component Value Date    WBC 9 89 03/08/2021    HGB 14 3 03/08/2021    HCT 44 5 03/08/2021    MCV 89 03/08/2021     03/08/2021     Lab Results   Component Value Date    CHOLESTEROL 147 08/10/2020     Lab Results   Component Value Date    HDL 66 08/10/2020     Lab Results   Component Value Date    LDLCALC 66 08/10/2020     Lab Results   Component Value Date    TRIG 75 08/10/2020     No results found for: [place], [place]  Lab Results   Component Value Date    DUH7FYWZQOQJ 2 560 03/08/2021     Lab Results   Component Value Date    CALCIUM 9 3 03/23/2021     No results found for: SPEP, UPEP  No results found for: MICROALBUR, [id number]        Objective:      /84 Pulse 66   Ht 5' 8" (1 727 m)   Wt 72 6 kg (160 lb)   SpO2 98%   BMI 24 33 kg/m²          Physical Exam    HENT:      Right Ear: External ear normal       Left Ear: External ear normal    Eyes:      Extraocular Movements: Extraocular movements intact  Pupils: Pupils are equal, round, and reactive to light  Cardiovascular:      Rate and Rhythm: Normal rate and regular rhythm  Pulmonary:      Effort: Pulmonary effort is normal       Breath sounds: Normal breath sounds  Abdominal:      General: Bowel sounds are normal  There is no distension  Tenderness: There is no abdominal tenderness  Musculoskeletal:      Right lower leg: No edema  Left lower leg: No edema  Neurological:      General: No focal deficit present  Psychiatric:         Mood and Affect: Mood normal          Behavior: Behavior normal          Thought Content:  Thought content normal          Judgment: Judgment normal

## 2021-04-16 ENCOUNTER — TELEPHONE (OUTPATIENT)
Dept: UROLOGY | Facility: MEDICAL CENTER | Age: 66
End: 2021-04-16

## 2021-04-16 NOTE — TELEPHONE ENCOUNTER
Dr Ismael Dejesus office called to get appointment for patient for a urolift consult  Please advise

## 2021-04-19 NOTE — TELEPHONE ENCOUNTER
vm left for pt to call back and schedule consult  Did advise pt it will need to be in the [address] office do to that [facility] does not do urolift      Next available with [facility]

## 2021-04-21 ENCOUNTER — OFFICE VISIT (OUTPATIENT)
Dept: SURGERY | Facility: CLINIC | Age: 66
End: 2021-04-21
Payer: MEDICARE

## 2021-04-21 VITALS
HEIGHT: 68 IN | RESPIRATION RATE: 18 BRPM | DIASTOLIC BLOOD PRESSURE: 80 MMHG | TEMPERATURE: 97.8 F | BODY MASS INDEX: 25.91 KG/M2 | SYSTOLIC BLOOD PRESSURE: 142 MMHG | HEART RATE: 65 BPM | WEIGHT: 171 LBS

## 2021-04-21 DIAGNOSIS — K42.9 UMBILICAL HERNIA WITHOUT OBSTRUCTION AND WITHOUT GANGRENE: Primary | ICD-10-CM

## 2021-04-21 DIAGNOSIS — K40.30 INCARCERATED RIGHT INGUINAL HERNIA: ICD-10-CM

## 2021-04-21 PROCEDURE — 99204 OFFICE O/P NEW MOD 45 MIN: CPT | Performed by: PHYSICIAN ASSISTANT

## 2021-04-21 RX ORDER — SODIUM CHLORIDE, SODIUM LACTATE, POTASSIUM CHLORIDE, CALCIUM CHLORIDE 600; 310; 30; 20 MG/100ML; MG/100ML; MG/100ML; MG/100ML
125 INJECTION, SOLUTION INTRAVENOUS CONTINUOUS
Status: CANCELLED | OUTPATIENT
Start: 2021-04-27

## 2021-04-21 RX ORDER — CHLORHEXIDINE GLUCONATE 4 G/100ML
SOLUTION TOPICAL DAILY PRN
Status: CANCELLED | OUTPATIENT
Start: 2021-04-27

## 2021-04-21 RX ORDER — CEFAZOLIN SODIUM 1 G/50ML
1000 SOLUTION INTRAVENOUS ONCE
Status: CANCELLED | OUTPATIENT
Start: 2021-04-27 | End: 2021-04-21

## 2021-04-21 NOTE — H&P (VIEW-ONLY)
H&P Exam - General Surgery   Marichuy Fink 77 y o  male MRN: [id number]   Encounter: [id number]    Assessment/Plan    Umbilical hernia without obstruction and without gangrene  Patient has a minimally symptomatic fat containing umbilical hernia present for number of years  As he has elected undergo elective right inguinal herniorrhaphy with mesh will offer umbilical herniorrhaphy with mesh at the same time  Details of procedure and risks related to anesthesia, bleeding, infection, recurrence were reviewed  Dr Sohail Maher to obtain informed written consent  Incarcerated right inguinal hernia  Patient found to have chronically incarcerated right inguinal hernia on exam   Recommendation made for open inguinal herniorrhaphy with mesh  Details of the procedure and risks related to anesthesia, bleeding, infection, chronic pain, recurrence were reviewed  Dr Sohail Maher to obtain informed written consent  Will expedite surgery to early next week if possible  Diagnoses and all orders for this visit:    Umbilical hernia without obstruction and without gangrene    Incarcerated right inguinal hernia        History of Present Illness   Chief Complaint   Patient presents with    Hernia     Patient is here today for a right inguinal hernia that has been present for a couple of years  Stated that he can palpate a lump at the right groin and the area is painful in general  States if he stands for a long period of time he can barley walk  No fever or chills  Malachi Emmanuel 57-year-old male here for evaluation of a right inguinal hernia  Present for a long time  Progressively worsening with pain and discomfort  No associated nausea or vomiting  No diarrhea or constipation  No fevers or chills  No dysuria  Also has known umbilical hernia without symptoms    Has upcoming urology consultation in June for prostate issue and chronic Ordonez catheter however would like hernia repair prior to this       Review of Systems   Constitutional: Negative for chills and fever  Respiratory: Negative for cough and shortness of breath  Cardiovascular: Negative for chest pain and palpitations  Gastrointestinal: Negative for abdominal pain, nausea and vomiting  All other systems reviewed and are negative  Historical Information   Past Medical History:   Diagnosis Date    Renal disorder      History reviewed  No pertinent surgical history  Social History   Social History     Substance and Sexual Activity   Alcohol Use Never    Frequency: Never     Social History     Substance and Sexual Activity   Drug Use Never     Social History     Tobacco Use   Smoking Status Former Smoker   Smokeless Tobacco Never Used     History reviewed  No pertinent family history  Meds/Allergies     Current Outpatient Medications:     amphetamine-dextroamphetamine (ADDERALL, 30MG,) 30 MG tablet, Take 10 mg by mouth 10mg tablets TID, Disp: , Rfl:     tamsulosin (FLOMAX) 0 4 mg, take 1 capsule by mouth once daily AT NOON, Disp: , Rfl:     oxybutynin (DITROPAN XL) 15 MG 24 hr tablet, Take 1 tablet (15 mg total) by mouth daily at bedtime (Patient not taking: Reported on 3/12/2021), Disp: 14 tablet, Rfl: 0    valACYclovir (VALTREX) 1,000 mg tablet, Take 1 tablet by mouth 3 (three) times a day, Disp: , Rfl:   No Known Allergies    The following portions of the patient's history were reviewed and updated as appropriate: allergies, current medications, past family history, past medical history, past social history, past surgical history and problem list     Objective   Current Vitals:   Blood pressure 142/80, pulse 65, temperature 97 8 °F (36 6 °C), temperature source Temporal, resp  rate 18, height 5' 8" (1 727 m), weight 77 6 kg (171 lb)  Physical Exam  Constitutional:       General: He is not in acute distress  Appearance: He is well-developed  He is not diaphoretic  HENT:      Head: Normocephalic and atraumatic  Eyes:      Pupils: Pupils are equal, round, and reactive to light  Neck:      Musculoskeletal: Normal range of motion  Pulmonary:      Effort: No respiratory distress  Abdominal:       Musculoskeletal: Normal range of motion  Skin:     General: Skin is warm and dry  Neurological:      Mental Status: He is alert and oriented to person, place, and time           Signature:  Altagracia Liz PA-C  Date: 4/21/2021 Time: 4:09 PM

## 2021-04-21 NOTE — ASSESSMENT & PLAN NOTE
Patient found to have chronically incarcerated right inguinal hernia on exam   Recommendation made for open inguinal herniorrhaphy with mesh  Details of the procedure and risks related to anesthesia, bleeding, infection, chronic pain, recurrence were reviewed  Dr Kelli Griffin to obtain informed written consent  Will expedite surgery to early next week if possible

## 2021-04-21 NOTE — PROGRESS NOTES
Patient is here today for a right inguinal hernia that has been present for a couple of years  Stated that he can palpate a lump at the right groin and the area is painful in general  States if he stands for a long period of time he can barley walk  No fever or chills   Ld Bergman

## 2021-04-21 NOTE — ASSESSMENT & PLAN NOTE
Patient has a minimally symptomatic fat containing umbilical hernia present for number of years  As he has elected undergo elective right inguinal herniorrhaphy with mesh will offer umbilical herniorrhaphy with mesh at the same time  Details of procedure and risks related to anesthesia, bleeding, infection, recurrence were reviewed  Dr Viktor Hamlin to obtain informed written consent

## 2021-04-21 NOTE — H&P
H&P Exam - General Surgery   Genaro Padilla 77 y o  male MRN: [id number]   Encounter: [id number]    Assessment/Plan    Umbilical hernia without obstruction and without gangrene  Patient has a minimally symptomatic fat containing umbilical hernia present for number of years  As he has elected undergo elective right inguinal herniorrhaphy with mesh will offer umbilical herniorrhaphy with mesh at the same time  Details of procedure and risks related to anesthesia, bleeding, infection, recurrence were reviewed  Dr Viktor Hamlin to obtain informed written consent  Incarcerated right inguinal hernia  Patient found to have chronically incarcerated right inguinal hernia on exam   Recommendation made for open inguinal herniorrhaphy with mesh  Details of the procedure and risks related to anesthesia, bleeding, infection, chronic pain, recurrence were reviewed  Dr Viktor Hamlin to obtain informed written consent  Will expedite surgery to early next week if possible  Diagnoses and all orders for this visit:    Umbilical hernia without obstruction and without gangrene    Incarcerated right inguinal hernia        History of Present Illness   Chief Complaint   Patient presents with    Hernia     Patient is here today for a right inguinal hernia that has been present for a couple of years  Stated that he can palpate a lump at the right groin and the area is painful in general  States if he stands for a long period of time he can barley walk  No fever or chills  Yocasta Emmanuel 24-year-old male here for evaluation of a right inguinal hernia  Present for a long time  Progressively worsening with pain and discomfort  No associated nausea or vomiting  No diarrhea or constipation  No fevers or chills  No dysuria  Also has known umbilical hernia without symptoms    Has upcoming urology consultation in June for prostate issue and chronic Ordonez catheter however would like hernia repair prior to this       Review of Systems   Constitutional: Negative for chills and fever  Respiratory: Negative for cough and shortness of breath  Cardiovascular: Negative for chest pain and palpitations  Gastrointestinal: Negative for abdominal pain, nausea and vomiting  All other systems reviewed and are negative  Historical Information   Past Medical History:   Diagnosis Date    Renal disorder      History reviewed  No pertinent surgical history  Social History   Social History     Substance and Sexual Activity   Alcohol Use Never    Frequency: Never     Social History     Substance and Sexual Activity   Drug Use Never     Social History     Tobacco Use   Smoking Status Former Smoker   Smokeless Tobacco Never Used     History reviewed  No pertinent family history  Meds/Allergies     Current Outpatient Medications:     amphetamine-dextroamphetamine (ADDERALL, 30MG,) 30 MG tablet, Take 10 mg by mouth 10mg tablets TID, Disp: , Rfl:     tamsulosin (FLOMAX) 0 4 mg, take 1 capsule by mouth once daily AT NOON, Disp: , Rfl:     oxybutynin (DITROPAN XL) 15 MG 24 hr tablet, Take 1 tablet (15 mg total) by mouth daily at bedtime (Patient not taking: Reported on 3/12/2021), Disp: 14 tablet, Rfl: 0    valACYclovir (VALTREX) 1,000 mg tablet, Take 1 tablet by mouth 3 (three) times a day, Disp: , Rfl:   No Known Allergies    The following portions of the patient's history were reviewed and updated as appropriate: allergies, current medications, past family history, past medical history, past social history, past surgical history and problem list     Objective   Current Vitals:   Blood pressure 142/80, pulse 65, temperature 97 8 °F (36 6 °C), temperature source Temporal, resp  rate 18, height 5' 8" (1 727 m), weight 77 6 kg (171 lb)  Physical Exam  Constitutional:       General: He is not in acute distress  Appearance: He is well-developed  He is not diaphoretic  HENT:      Head: Normocephalic and atraumatic  Eyes:      Pupils: Pupils are equal, round, and reactive to light  Neck:      Musculoskeletal: Normal range of motion  Pulmonary:      Effort: No respiratory distress  Abdominal:       Musculoskeletal: Normal range of motion  Skin:     General: Skin is warm and dry  Neurological:      Mental Status: He is alert and oriented to person, place, and time           Signature:  Lia Liz PA-C  Date: 4/21/2021 Time: 4:09 PM

## 2021-04-23 ENCOUNTER — TELEPHONE (OUTPATIENT)
Dept: SURGERY | Facility: CLINIC | Age: 66
End: 2021-04-23

## 2021-04-23 NOTE — TELEPHONE ENCOUNTER
Reached out to the patient with a post visit phone call from his appointment on 04/21/2021  Left message asking if he had any questions or concerns to contact our office

## 2021-04-23 NOTE — PRE-PROCEDURE INSTRUCTIONS
Pre-Surgery Instructions:   Medication Instructions    amphetamine-dextroamphetamine (ADDERALL, 30MG,) 30 MG tablet Continue this medication up to the evening before surgery/procedure, but do not take the morning of the day of surgery   tamsulosin (FLOMAX) 0 4 mg Continue to take this medication on your normal schedule  If this is an oral medication and you take it in the morning, then you may take this medicine with a sip of water      Covid screening negative as per patient  Reviewed with patient via phone medication instructions  Advised not to take any NSAID's, Vitamins or Herbal products prior to the DOS  Acetaminophen products are ok  Reviewed showering instructions as given by Surgeon's office  Instructed nothing by mouth after midnight the night before the DOS  Informed about call from [facility] with time to arrive for scheduled surgery  Patient verbalized understanding  Alpha-1 adrenergic blocker Med Class  Continue to take this medication on your normal schedule  If this is an oral medication and you take it in the morning, then you may take this medicine with a sip of water  Methylphenidate Med Class  Continue this medication up to the evening before surgery/procedure, but do not take the morning of the day of surgery

## 2021-04-26 ENCOUNTER — ANESTHESIA EVENT (OUTPATIENT)
Dept: PERIOP | Facility: HOSPITAL | Age: 66
End: 2021-04-26
Payer: MEDICARE

## 2021-04-27 ENCOUNTER — NURSE TRIAGE (OUTPATIENT)
Dept: OTHER | Facility: OTHER | Age: 66
End: 2021-04-27

## 2021-04-27 ENCOUNTER — ANESTHESIA (OUTPATIENT)
Dept: PERIOP | Facility: HOSPITAL | Age: 66
End: 2021-04-27
Payer: MEDICARE

## 2021-04-27 ENCOUNTER — HOSPITAL ENCOUNTER (OUTPATIENT)
Facility: HOSPITAL | Age: 66
Setting detail: OUTPATIENT SURGERY
Discharge: HOME | End: 2021-04-27
Attending: SURGERY | Admitting: SURGERY
Payer: MEDICARE

## 2021-04-27 VITALS
RESPIRATION RATE: 18 BRPM | WEIGHT: 171 LBS | TEMPERATURE: 97.2 F | HEART RATE: 66 BPM | OXYGEN SATURATION: 97 % | HEIGHT: 68 IN | BODY MASS INDEX: 25.91 KG/M2 | DIASTOLIC BLOOD PRESSURE: 70 MMHG | SYSTOLIC BLOOD PRESSURE: 133 MMHG

## 2021-04-27 DIAGNOSIS — K40.30 INCARCERATED RIGHT INGUINAL HERNIA: Primary | ICD-10-CM

## 2021-04-27 LAB
ANION GAP SERPL CALCULATED.3IONS-SCNC: 8 MMOL/L (ref 4–13)
BUN SERPL-MCNC: 22 MG/DL (ref 7–25)
CALCIUM SERPL-MCNC: 9.4 MG/DL (ref 8.6–10.5)
CHLORIDE SERPL-SCNC: 103 MMOL/L (ref 98–107)
CO2 SERPL-SCNC: 29 MMOL/L (ref 21–31)
CREAT SERPL-MCNC: 1.64 MG/DL (ref 0.7–1.3)
ERYTHROCYTE [DISTWIDTH] IN BLOOD BY AUTOMATED COUNT: 14.9 % (ref 11.5–14.5)
GFR SERPL CREATININE-BSD FRML MDRD: 43 ML/MIN/1.73SQ M
GLUCOSE P FAST SERPL-MCNC: 117 MG/DL (ref 65–99)
GLUCOSE SERPL-MCNC: 117 MG/DL (ref 65–99)
HCT VFR BLD AUTO: 49.2 % (ref 42–47)
HGB BLD-MCNC: 16.2 G/DL (ref 14–18)
MCH RBC QN AUTO: 28.8 PG (ref 26–34)
MCHC RBC AUTO-ENTMCNC: 32.8 G/DL (ref 31–37)
MCV RBC AUTO: 88 FL (ref 81–99)
PLATELET # BLD AUTO: 287 THOUSANDS/UL (ref 149–390)
PMV BLD AUTO: 8.1 FL (ref 8.6–11.7)
POTASSIUM SERPL-SCNC: 3.9 MMOL/L (ref 3.5–5.5)
RBC # BLD AUTO: 5.61 MILLION/UL (ref 4.3–5.9)
SODIUM SERPL-SCNC: 140 MMOL/L (ref 134–143)
WBC # BLD AUTO: 8.2 THOUSAND/UL (ref 4.8–10.8)

## 2021-04-27 PROCEDURE — 85027 COMPLETE CBC AUTOMATED: CPT | Performed by: PHYSICIAN ASSISTANT

## 2021-04-27 PROCEDURE — 93005 ELECTROCARDIOGRAM TRACING: CPT

## 2021-04-27 PROCEDURE — 80048 BASIC METABOLIC PNL TOTAL CA: CPT | Performed by: PHYSICIAN ASSISTANT

## 2021-04-27 PROCEDURE — C1781 MESH (IMPLANTABLE): HCPCS | Performed by: SURGERY

## 2021-04-27 PROCEDURE — 49507 PRP I/HERN INIT BLOCK >5 YR: CPT | Performed by: PHYSICIAN ASSISTANT

## 2021-04-27 PROCEDURE — 49507 PRP I/HERN INIT BLOCK >5 YR: CPT | Performed by: SURGERY

## 2021-04-27 PROCEDURE — 49585 PR REPAIR UMBILICAL HERN,5+Y/O,REDUC: CPT | Performed by: PHYSICIAN ASSISTANT

## 2021-04-27 PROCEDURE — 49585 PR REPAIR UMBILICAL HERN,5+Y/O,REDUC: CPT | Performed by: SURGERY

## 2021-04-27 DEVICE — BARD VENTRALEX HERNIA PATCH, 4.3 CM (1.7"), SMALL CIRCLE WITH STRAP
Type: IMPLANTABLE DEVICE | Site: UMBILICAL | Status: FUNCTIONAL
Brand: VENTRALEX

## 2021-04-27 DEVICE — BARD MESH PERFIX PLUG, LARGE
Type: IMPLANTABLE DEVICE | Site: GROIN | Status: FUNCTIONAL
Brand: BARD MESH PERFIX PLUG

## 2021-04-27 RX ORDER — OXYCODONE HYDROCHLORIDE AND ACETAMINOPHEN 5; 325 MG/1; MG/1
1 TABLET ORAL EVERY 4 HOURS PRN
Status: DISCONTINUED | OUTPATIENT
Start: 2021-04-27 | End: 2021-04-27 | Stop reason: HOSPADM

## 2021-04-27 RX ORDER — DEXAMETHASONE SODIUM PHOSPHATE 4 MG/ML
INJECTION, SOLUTION INTRA-ARTICULAR; INTRALESIONAL; INTRAMUSCULAR; INTRAVENOUS; SOFT TISSUE AS NEEDED
Status: DISCONTINUED | OUTPATIENT
Start: 2021-04-27 | End: 2021-04-27

## 2021-04-27 RX ORDER — SODIUM CHLORIDE, SODIUM LACTATE, POTASSIUM CHLORIDE, CALCIUM CHLORIDE 600; 310; 30; 20 MG/100ML; MG/100ML; MG/100ML; MG/100ML
50 INJECTION, SOLUTION INTRAVENOUS CONTINUOUS
Status: DISCONTINUED | OUTPATIENT
Start: 2021-04-27 | End: 2021-04-27 | Stop reason: HOSPADM

## 2021-04-27 RX ORDER — OXYCODONE HYDROCHLORIDE AND ACETAMINOPHEN 5; 325 MG/1; MG/1
1 TABLET ORAL EVERY 4 HOURS PRN
Qty: 20 TABLET | Refills: 0 | Status: SHIPPED | OUTPATIENT
Start: 2021-04-27

## 2021-04-27 RX ORDER — NEOSTIGMINE METHYLSULFATE 1 MG/ML
INJECTION INTRAVENOUS AS NEEDED
Status: DISCONTINUED | OUTPATIENT
Start: 2021-04-27 | End: 2021-04-27

## 2021-04-27 RX ORDER — ONDANSETRON 2 MG/ML
4 INJECTION INTRAMUSCULAR; INTRAVENOUS ONCE AS NEEDED
Status: DISCONTINUED | OUTPATIENT
Start: 2021-04-27 | End: 2021-04-27 | Stop reason: HOSPADM

## 2021-04-27 RX ORDER — OXYCODONE HYDROCHLORIDE AND ACETAMINOPHEN 5; 325 MG/1; MG/1
1 TABLET ORAL ONCE
Status: ACTIVE | OUTPATIENT
Start: 2021-04-27

## 2021-04-27 RX ORDER — MAGNESIUM HYDROXIDE 1200 MG/15ML
LIQUID ORAL AS NEEDED
Status: DISCONTINUED | OUTPATIENT
Start: 2021-04-27 | End: 2021-04-27 | Stop reason: HOSPADM

## 2021-04-27 RX ORDER — FENTANYL CITRATE 50 UG/ML
INJECTION, SOLUTION INTRAMUSCULAR; INTRAVENOUS AS NEEDED
Status: DISCONTINUED | OUTPATIENT
Start: 2021-04-27 | End: 2021-04-27

## 2021-04-27 RX ORDER — CEFAZOLIN SODIUM 1 G/50ML
1000 SOLUTION INTRAVENOUS ONCE
Status: COMPLETED | OUTPATIENT
Start: 2021-04-27 | End: 2021-04-27

## 2021-04-27 RX ORDER — ONDANSETRON 2 MG/ML
INJECTION INTRAMUSCULAR; INTRAVENOUS AS NEEDED
Status: DISCONTINUED | OUTPATIENT
Start: 2021-04-27 | End: 2021-04-27

## 2021-04-27 RX ORDER — LIDOCAINE HYDROCHLORIDE 10 MG/ML
INJECTION, SOLUTION EPIDURAL; INFILTRATION; INTRACAUDAL; PERINEURAL AS NEEDED
Status: DISCONTINUED | OUTPATIENT
Start: 2021-04-27 | End: 2021-04-27

## 2021-04-27 RX ORDER — PROPOFOL 10 MG/ML
INJECTION, EMULSION INTRAVENOUS AS NEEDED
Status: DISCONTINUED | OUTPATIENT
Start: 2021-04-27 | End: 2021-04-27

## 2021-04-27 RX ORDER — ROCURONIUM BROMIDE 10 MG/ML
INJECTION, SOLUTION INTRAVENOUS AS NEEDED
Status: DISCONTINUED | OUTPATIENT
Start: 2021-04-27 | End: 2021-04-27

## 2021-04-27 RX ORDER — GLYCOPYRROLATE 0.2 MG/ML
INJECTION INTRAMUSCULAR; INTRAVENOUS AS NEEDED
Status: DISCONTINUED | OUTPATIENT
Start: 2021-04-27 | End: 2021-04-27

## 2021-04-27 RX ORDER — MIDAZOLAM HYDROCHLORIDE 2 MG/2ML
INJECTION, SOLUTION INTRAMUSCULAR; INTRAVENOUS AS NEEDED
Status: DISCONTINUED | OUTPATIENT
Start: 2021-04-27 | End: 2021-04-27

## 2021-04-27 RX ORDER — CHLORHEXIDINE GLUCONATE 4 G/100ML
SOLUTION TOPICAL DAILY PRN
Status: DISCONTINUED | OUTPATIENT
Start: 2021-04-27 | End: 2021-04-27 | Stop reason: HOSPADM

## 2021-04-27 RX ORDER — SODIUM CHLORIDE, SODIUM LACTATE, POTASSIUM CHLORIDE, CALCIUM CHLORIDE 600; 310; 30; 20 MG/100ML; MG/100ML; MG/100ML; MG/100ML
125 INJECTION, SOLUTION INTRAVENOUS CONTINUOUS
Status: DISCONTINUED | OUTPATIENT
Start: 2021-04-27 | End: 2021-04-27

## 2021-04-27 RX ORDER — BUPIVACAINE HYDROCHLORIDE 5 MG/ML
INJECTION, SOLUTION PERINEURAL AS NEEDED
Status: DISCONTINUED | OUTPATIENT
Start: 2021-04-27 | End: 2021-04-27 | Stop reason: HOSPADM

## 2021-04-27 RX ORDER — HYDROMORPHONE HCL IN WATER/PF 6 MG/30 ML
0.2 PATIENT CONTROLLED ANALGESIA SYRINGE INTRAVENOUS
Status: DISCONTINUED | OUTPATIENT
Start: 2021-04-27 | End: 2021-04-27 | Stop reason: HOSPADM

## 2021-04-27 RX ORDER — SODIUM CHLORIDE, SODIUM LACTATE, POTASSIUM CHLORIDE, CALCIUM CHLORIDE 600; 310; 30; 20 MG/100ML; MG/100ML; MG/100ML; MG/100ML
20 INJECTION, SOLUTION INTRAVENOUS CONTINUOUS
Status: DISCONTINUED | OUTPATIENT
Start: 2021-04-27 | End: 2021-04-27 | Stop reason: HOSPADM

## 2021-04-27 RX ORDER — OXYCODONE HYDROCHLORIDE AND ACETAMINOPHEN 5; 325 MG/1; MG/1
1 TABLET ORAL EVERY 4 HOURS PRN
Qty: 15 TABLET | Refills: 0 | Status: SHIPPED | OUTPATIENT
Start: 2021-04-27 | End: 2021-05-07

## 2021-04-27 RX ORDER — FENTANYL CITRATE/PF 50 MCG/ML
25 SYRINGE (ML) INJECTION
Status: DISCONTINUED | OUTPATIENT
Start: 2021-04-27 | End: 2021-04-27 | Stop reason: HOSPADM

## 2021-04-27 RX ORDER — LIDOCAINE HYDROCHLORIDE AND EPINEPHRINE 10; 10 MG/ML; UG/ML
INJECTION, SOLUTION INFILTRATION; PERINEURAL AS NEEDED
Status: DISCONTINUED | OUTPATIENT
Start: 2021-04-27 | End: 2021-04-27 | Stop reason: HOSPADM

## 2021-04-27 RX ADMIN — OXYCODONE HYDROCHLORIDE AND ACETAMINOPHEN 1 TABLET: 5; 325 TABLET ORAL at 15:02

## 2021-04-27 RX ADMIN — LIDOCAINE HYDROCHLORIDE 80 MG: 10 INJECTION, SOLUTION EPIDURAL; INFILTRATION; INTRACAUDAL; PERINEURAL at 12:27

## 2021-04-27 RX ADMIN — DEXAMETHASONE SODIUM PHOSPHATE 8 MG: 4 INJECTION, SOLUTION INTRA-ARTICULAR; INTRALESIONAL; INTRAMUSCULAR; INTRAVENOUS; SOFT TISSUE at 12:39

## 2021-04-27 RX ADMIN — GLYCOPYRROLATE 0.4 MG: 0.2 INJECTION, SOLUTION INTRAMUSCULAR; INTRAVENOUS at 13:36

## 2021-04-27 RX ADMIN — FENTANYL CITRATE 25 MCG: 50 INJECTION INTRAMUSCULAR; INTRAVENOUS at 14:14

## 2021-04-27 RX ADMIN — ROCURONIUM BROMIDE 30 MG: 10 INJECTION INTRAVENOUS at 12:27

## 2021-04-27 RX ADMIN — ROCURONIUM BROMIDE 10 MG: 10 INJECTION INTRAVENOUS at 13:23

## 2021-04-27 RX ADMIN — SODIUM CHLORIDE, SODIUM LACTATE, POTASSIUM CHLORIDE, AND CALCIUM CHLORIDE: .6; .31; .03; .02 INJECTION, SOLUTION INTRAVENOUS at 13:36

## 2021-04-27 RX ADMIN — ONDANSETRON 4 MG: 2 INJECTION INTRAMUSCULAR; INTRAVENOUS at 13:32

## 2021-04-27 RX ADMIN — PROPOFOL 150 MG: 10 INJECTION, EMULSION INTRAVENOUS at 12:27

## 2021-04-27 RX ADMIN — FENTANYL CITRATE 50 MCG: 50 INJECTION INTRAMUSCULAR; INTRAVENOUS at 13:19

## 2021-04-27 RX ADMIN — MIDAZOLAM HYDROCHLORIDE 2 MG: 1 INJECTION, SOLUTION INTRAMUSCULAR; INTRAVENOUS at 12:23

## 2021-04-27 RX ADMIN — CEFAZOLIN SODIUM 2000 MG: 1 SOLUTION INTRAVENOUS at 12:23

## 2021-04-27 RX ADMIN — FENTANYL CITRATE 50 MCG: 50 INJECTION INTRAMUSCULAR; INTRAVENOUS at 12:27

## 2021-04-27 RX ADMIN — SODIUM CHLORIDE, SODIUM LACTATE, POTASSIUM CHLORIDE, AND CALCIUM CHLORIDE 125 ML/HR: .6; .31; .03; .02 INJECTION, SOLUTION INTRAVENOUS at 11:14

## 2021-04-27 RX ADMIN — NEOSTIGMINE METHYLSULFATE 3 MG: 1 INJECTION, SOLUTION INTRAVENOUS at 13:36

## 2021-04-27 NOTE — INTERVAL H&P NOTE
H&P reviewed  After examining the patient I find no changes in the patients condition since the H&P had been written      Vitals:    04/27/21 1050   BP: (!) 158/103   Pulse: 63   Resp: 16   Temp: (!) 97 3 °F (36 3 °C)   SpO2: 98%

## 2021-04-27 NOTE — ANESTHESIA POSTPROCEDURE EVALUATION
Post-Op Assessment Note    CV Status:  Stable  Pain Score: 0    Pain management: adequate     Mental Status:  Arousable   PONV Controlled:  None      Post Op Vitals Reviewed: Yes      Staff: CRNA         No complications documented      /82   Temp  97 6   Pulse  89   Resp   16   SpO2   100

## 2021-04-27 NOTE — DISCHARGE INSTRUCTIONS
Inguinal Hernia   WHAT YOU NEED TO KNOW:   An inguinal hernia happens when organs or abdominal tissue push through a weak spot in the abdominal wall  The abdominal wall is made of fat and muscle  It holds the intestines in place  The hernia may contain fluid, tissue from the abdomen, or part of an organ (such as an intestine)  DISCHARGE INSTRUCTIONS:   Seek care immediately if:   · You have severe abdominal pain with nausea and vomiting  · Your abdomen is larger than usual      · Your hernia gets bigger or is purple or blue  · You see blood in your bowel movements  · You feel weak, dizzy, or faint  Contact your healthcare provider if:   · You have a fever  · You have questions or concerns about your condition or care  Medicine: You may  need the following:  · NSAIDs , such as ibuprofen, help decrease swelling, pain, and fever  NSAIDs can cause stomach bleeding or kidney problems in certain people  If you take blood thinner medicine, always ask your healthcare provider if NSAIDs are safe for you  Always read the medicine label and follow directions  · Take your medicine as directed  Contact your healthcare provider if you think your medicine is not helping or if you have side effects  Tell him or her if you are allergic to any medicine  Keep a list of the medicines, vitamins, and herbs you take  Include the amounts, and when and why you take them  Bring the list or the pill bottles to follow-up visits  Carry your medicine list with you in case of an emergency  Follow up with your healthcare provider as directed:  Write down your questions so you remember to ask them during your visits  Manage your symptoms and prevent another hernia:   · Do not lift anything heavy  Heavy lifting can make your hernia worse or cause another hernia  Ask your healthcare provider how much is safe for you to lift  · Drink liquids as directed    Liquids may prevent constipation and straining during a bowel movement  Ask how much liquid to drink each day and which liquids are best for you  · Eat foods high in fiber  Fiber may prevent constipation and straining during a bowel movement  Foods that contain fiber include fruits, vegetables, beans, lentils, and whole grains  · Maintain a healthy weight  If you are overweight, weight loss may prevent your hernia from getting worse  It may also prevent another hernia  Talk to your healthcare provider about exercise and how to lose weight safely if you are overweight  · Do not smoke  Nicotine and other chemicals in cigarettes and cigars can weaken the abdominal wall  This may increase your risk for another hernia  Ask your healthcare provider for information if you currently smoke and need help to quit  E-cigarettes or smokeless tobacco still contain nicotine  Talk to your healthcare provider before you use these products  © Copyright [address] Information is for End User's use only and may not be sold, redistributed or otherwise used for commercial purposes  All illustrations and images included in CareNotes® are the copyrighted property of [employer]  or [employer]   The above information is an  only  It is not intended as medical advice for individual conditions or treatments  Talk to your doctor, nurse or pharmacist before following any medical regimen to see if it is safe and effective for you  Umbilical Hernia   WHAT YOU NEED TO KNOW:   An umbilical hernia is a bulge through the abdominal wall near your umbilicus (belly button)  The hernia may contain tissue from the abdomen, part of an organ (such as the intestine), or fluid  DISCHARGE INSTRUCTIONS:   Seek care immediately if:   · Your hernia gets bigger, feels firm, or turns blue or purple  · You have severe abdominal pain with nausea or vomiting  · You stop having bowel movements and passing gas      · You have blood in your bowel movement  Contact your healthcare provider if:   · You have a fever  · You have nausea or are vomiting  · You are constipated  · You have questions or concerns about your condition or care  Self-care:   · Drink more liquids  Liquids may prevent constipation and straining during a bowel movement  This can prevent your hernia from getting bigger  Ask how much liquid you should drink each day and which liquids are best for you  · Eat high-fiber foods  Fiber may prevent constipation and straining during a bowel movement  This can prevent your hernia from getting bigger  Foods that contain fiber include fruits, vegetables, legumes, and whole grains  · Avoid heavy lifting  Heavy lifting can put pressure on your hernia and make it bigger  Ask your healthcare provider how much is safe to lift  · Do not place anything over your umbilical hernia  Do not place tape or a coin over the hernia  This treatment does not help treat a hernia  Follow up with your healthcare provider as directed: You may need to see a surgeon to plan for hernia repair  Write down your questions so you remember to ask them during your visits  © Copyright [address] Information is for End User's use only and may not be sold, redistributed or otherwise used for commercial purposes  All illustrations and images included in CareNotes® are the copyrighted property of A D [employer] M , Inc  or [address] [place], [place] [address]mel[place]   The above information is an  only  It is not intended as medical advice for individual conditions or treatments  Talk to your doctor, nurse or pharmacist before following any medical regimen to see if it is safe and effective for you

## 2021-04-27 NOTE — ANESTHESIA PREPROCEDURE EVALUATION
Procedure:  OPEN UMBILICAL HERNIA REPAIR WITH MESH (N/A Abdomen)  OPEN INGUINAL HERNIA REPAIR WITH MESH (Right Groin)    Relevant Problems   /RENAL   (+) Acute renal failure with tubular necrosis (HCC)   (+) Hydronephrosis   (+) Stage 2 chronic kidney disease      CAD/PCI/MI/CHF -- DENIES, > 4 METS W/O LIMITATION  COPD/ASTHMA/MAIK -- DENIES  PROBS WITH PRIOR ANESTHESIA -- DENIES  NPO STATUS CONFIRMED    Lab Results   Component Value Date    WBC 8 20 04/27/2021    HGB 16 2 04/27/2021     04/27/2021     Lab Results   Component Value Date    SODIUM 140 04/27/2021    K 3 9 04/27/2021    BUN 22 04/27/2021    CREATININE 1 64 (H) 04/27/2021    EGFR 43 04/27/2021     No results found for: PTT   No results found for: INR      No results found for: HGBA1C          Physical Exam    Airway    Mallampati score: II  TM Distance: >3 FB       Dental       Cardiovascular      Pulmonary      Other Findings  LOOSE RIGHT UPPER TOOTH/FILLING (PATIENT UNCLEAR AS TO WHAT IS EXACTLY LOOSE)      Anesthesia Plan  ASA Score- 2     Anesthesia Type- general with ASA Monitors  Additional Monitors:   Airway Plan: ETT and LMA  Comment:  JUSTYN Chester , have personally seen and evaluated the patient prior to anesthetic care  I have reviewed the pre-anesthetic record, and other medical records if appropriate to the anesthetic care  If a CRNA is involved in the case, I have reviewed the CRNA assessment, if present, and agree  Risks/benefits and alternatives discussed with patient including possible PONV, sore throat, and possibility of rare anesthetic and surgical emergencies  PATIENT COUNSELED ON RISK OF FURTHER LOOSENING OF ALREADY LOOSE TOOTH/IMPLANT    ETT VS  LMA PENDING SURGICAL NEED FOR MUSCLE RELAXANT    Plan Factors-Exercise tolerance (METS): >4 METS  Chart reviewed  EKG reviewed  Imaging results reviewed  Existing labs reviewed  Patient summary reviewed  Patient is not a current smoker  Patient not instructed to abstain from smoking on day of procedure  Induction- intravenous  Postoperative Plan- Plan for postoperative opioid use  Planned trial extubation    Informed Consent- Anesthetic plan and risks discussed with patient  I personally reviewed this patient with the CRNA  Discussed and agreed on the Anesthesia Plan with the CRNA  Mercedez Mckeon

## 2021-04-27 NOTE — TELEPHONE ENCOUNTER
Regarding: Never received medication  ----- Message from Ale Agosto sent at 4/27/2021  5:52 PM EDT -----  " I was discharged from the hospital and they never sent the percocet to the pharmacy  "

## 2021-04-27 NOTE — OP NOTE
OPERATIVE REPORT  PATIENT NAME: Rick Stevens    :  1955  MRN: [id number]  Pt Location: [address] [place] [address] OR ROOM 02    SURGERY DATE: 2021    Surgeon(s) and Role:     * Bonifacio Castaneda MD - Primary     * Marie Lua PA-C - Assisting  The PA was necessary to provide expert assistance; i e  in the form of providing optimal exposure with retraction, suturing, and assistance with dissection in order to perform the most efficient operation and in order to optimize patient safety in the abscence of a qualified surgical resident  Preop Diagnosis:  Incarcerated right inguinal hernia [S62 37]  Umbilical hernia without obstruction and without gangrene [K42 9]    Post-Op Diagnosis Codes:     * Incarcerated right inguinal hernia [X21 30]     * Umbilical hernia without obstruction and without gangrene [K42 9]    Procedure(s) (LRB):  OPEN UMBILICAL HERNIA REPAIR WITH MESH (N/A)  OPEN INGUINAL HERNIA REPAIR WITH MESH (Right)    Specimen(s):  * No specimens in log *    Estimated Blood Loss:   Minimal    Drains:  * No LDAs found *    Anesthesia Type:   General    Operative Indications:  Incarcerated right inguinal hernia [J51 53]  Umbilical hernia without obstruction and without gangrene [K42 9]  Patient is a pleasant 69-year-old male presenting with a history for recently incarcerated right inguinal hernia for which definitive treatment by open mesh repair is now indicated  Additionally the patient has ache chronically incarcerated umbilical hernia which will be repaired the time of the same anesthetic  Operative Findings:  1  Indirect right inguinal hernia  2  Umbilical hernia     Complications:   None    Procedure and Technique:  The patient was taken to the operating room where they are properly identified, monitored and anesthetized  They received antibiotics perioperatively  Venodynes used for DVT prophylaxis  Time-out performed  Skin infiltrated with 1% lidocaine local anesthesia with epinephrine    Skin incised  Dissection carried down through Herminia's to the aponeurosis of the external oblique which was opened to the external ring  Leaves developed superiorly and inferiorly  Self-retaining retractor placed  The spermatic cord dissected out at the pubic tubercle and controlled with a Penrose drain  The indirect sac readily identified, high dissection of the sac carried back to the internal ring after which the sac was reduced through the internal ring  A Bard PerFix plug was placed through the internal ring and secured circumferentially with interrupted 2 0 Vicryl suture  The onlay mesh placed over the floor the canal and around the internal ring  The aponeurosis of the external oblique closed over top of the repair with a running 3 0 Vicryl suture  The wound irrigated and closed in layers with interrupted 3 0 Vicryl on Herminia's and running subcuticular 4 Monocryl suture on skin  The wounds infiltrated with 0 5% Marcaine  Dressed  Our attention then turned to the umbilical hernia repair  The patient was taken to the operating room where they were properly identified, monitored and anesthetized  The received antibiotics perioperatively  Denies used for DVT prophylaxis  The abdomen prepped and draped under sterile conditions using aseptic technique  Time-out performed  Skin infiltrated with 1% lidocaine local anesthesia with epinephrine  Skin incised  Dissection carried down to the fascial defect  It was dissected out circumferentially and reduced beneath the level of the fascia  Strong fascia was grasped above and below with Kocher's  A small Ventralex mesh placed in the subfascial position and tacked to the overlying mesh with interrupted 0 Vicryl suture  The fascial defect closed primarily over top of the repair with a figure-of-eight of 0 Vicryl suture  The wound irrigated and closed with subcuticular 4 Monocryl suture  The wound infiltrated with 0 5% Marcaine  Dressed sterilely  The patient extubated and taken to recovery in stable condition         I was present for the entire procedure    Patient Disposition:  PACU     SIGNATURE: Nissa Khoury MD  DATE: April 27, 2021  TIME: 1:45 PM

## 2021-04-27 NOTE — TELEPHONE ENCOUNTER
Reason for Disposition   [1] Prescription not at pharmacy AND [2] was prescribed by PCP recently    Answer Assessment - Initial Assessment Questions  1  NAME of MEDICATION: "What medicine are you calling about?"      Percocet  2  QUESTION: "What is your question?"      "Not at Pharmacy"  3  PRESCRIBING HCP: "Who prescribed it?" Reason: if prescribed by specialist, call should be referred to that group        Ian Ritter    Protocols used: MEDICATION QUESTION CALL-ADULT-

## 2021-04-28 LAB
ATRIAL RATE: 82 BPM
P AXIS: 74 DEGREES
PR INTERVAL: 192 MS
QRS AXIS: 34 DEGREES
QRSD INTERVAL: 78 MS
QT INTERVAL: 372 MS
QTC INTERVAL: 434 MS
T WAVE AXIS: 62 DEGREES
VENTRICULAR RATE: 82 BPM

## 2021-04-28 PROCEDURE — 93010 ELECTROCARDIOGRAM REPORT: CPT | Performed by: INTERNAL MEDICINE

## 2021-05-10 ENCOUNTER — OFFICE VISIT (OUTPATIENT)
Dept: SURGERY | Facility: CLINIC | Age: 66
End: 2021-05-10

## 2021-05-10 VITALS — TEMPERATURE: 98.1 F

## 2021-05-10 DIAGNOSIS — K42.9 UMBILICAL HERNIA WITHOUT OBSTRUCTION AND WITHOUT GANGRENE: ICD-10-CM

## 2021-05-10 DIAGNOSIS — K40.30 INCARCERATED RIGHT INGUINAL HERNIA: Primary | ICD-10-CM

## 2021-05-10 PROCEDURE — 99024 POSTOP FOLLOW-UP VISIT: CPT | Performed by: SURGERY

## 2021-05-10 NOTE — LETTER
May 10, 2021     Iraj Buchanan MD  [address] [place] [address]    Patient: Leida Smith   YOB: 1955   Date of Visit: 5/10/2021       Dear Dr Latoya Oh: Thank you for referring Leida Smith to me for evaluation  Below are my notes for this consultation  If you have questions, please do not hesitate to call me  I look forward to following your patient along with you  Sincerely,        Charlene Cruz MD        CC: No Recipients  Charlene Cruz MD  5/10/2021  1:53 PM  Incomplete  Assessment/Plan:    Incarcerated right inguinal hernia    Patient returns for routine scheduled follow-up status post right inguinal hernia repair and umbilical hernia repair with mesh  The patient denies all complaints referable to his abdomen  On physical examination the surgical wounds are clean dry and intact  No signs of early recurrent hernia formation either at the umbilicus or in the right groin  The technical details of surgery were again reviewed with the patient  A copy of the operative note provided to the patient for his permanent record  All questions were answered to the satisfaction of the patient  He has been encouraged to resume normal levels of activity as he sees fit  He has been encouraged to follow up with the practice at any point future with questions or concerns  Subjective:      Patient ID: Leida Smith is a 74 y o  male  Patient is here today po s/p umbilical and right inguianl hernia repair 4/27/2021  Stated that he is doing well and denied any new problems or concerns  No fever or chills  Susanna Senior MA        Review of Systems   Constitutional: Negative for chills and fever  HENT: Negative for ear pain and sore throat  Eyes: Negative for pain and visual disturbance  Respiratory: Negative for cough and shortness of breath  Cardiovascular: Negative for chest pain and palpitations     Gastrointestinal: Negative for abdominal pain and vomiting  Genitourinary: Negative for dysuria and hematuria  Musculoskeletal: Negative for arthralgias and back pain  Skin: Negative for color change and rash  Neurological: Negative for seizures and syncope  All other systems reviewed and are negative  Objective:      Temp 98 1 °F (36 7 °C) (Temporal)          Physical Exam  Vitals signs and nursing note reviewed  Constitutional:       Appearance: He is well-developed  HENT:      Head: Normocephalic and atraumatic  Eyes:      Conjunctiva/sclera: Conjunctivae normal       Pupils: Pupils are equal, round, and reactive to light  Neck:      Musculoskeletal: Normal range of motion and neck supple  Cardiovascular:      Rate and Rhythm: Normal rate and regular rhythm  Pulmonary:      Effort: Pulmonary effort is normal       Breath sounds: Normal breath sounds  Abdominal:      General: Bowel sounds are normal       Palpations: Abdomen is soft  Musculoskeletal: Normal range of motion  Skin:     General: Skin is warm and dry  Neurological:      Mental Status: He is alert and oriented to person, place, and time  Psychiatric:         Behavior: Behavior normal          Thought Content:  Thought content normal          Judgment: Judgment normal

## 2021-05-10 NOTE — PROGRESS NOTES
Assessment/Plan:    Incarcerated right inguinal hernia    Patient returns for routine scheduled follow-up status post right inguinal hernia repair and umbilical hernia repair with mesh  The patient denies all complaints referable to his abdomen  On physical examination the surgical wounds are clean dry and intact  No signs of early recurrent hernia formation either at the umbilicus or in the right groin  The technical details of surgery were again reviewed with the patient  A copy of the operative note provided to the patient for his permanent record  All questions were answered to the satisfaction of the patient  He has been encouraged to resume normal levels of activity as he sees fit  He has been encouraged to follow up with the practice at any point future with questions or concerns  Subjective:      Patient ID: Marquise Tomlin is a 74 y o  male  Patient is here today po s/p umbilical and right inguianl hernia repair 4/27/2021  Stated that he is doing well and denied any new problems or concerns  No fever or chills  Susanna Senior MA        Review of Systems   Constitutional: Negative for chills and fever  HENT: Negative for ear pain and sore throat  Eyes: Negative for pain and visual disturbance  Respiratory: Negative for cough and shortness of breath  Cardiovascular: Negative for chest pain and palpitations  Gastrointestinal: Negative for abdominal pain and vomiting  Genitourinary: Negative for dysuria and hematuria  Musculoskeletal: Negative for arthralgias and back pain  Skin: Negative for color change and rash  Neurological: Negative for seizures and syncope  All other systems reviewed and are negative  Objective:      Temp 98 1 °F (36 7 °C) (Temporal)          Physical Exam  Vitals signs and nursing note reviewed  Constitutional:       Appearance: He is well-developed  HENT:      Head: Normocephalic and atraumatic     Eyes: Conjunctiva/sclera: Conjunctivae normal       Pupils: Pupils are equal, round, and reactive to light  Neck:      Musculoskeletal: Normal range of motion and neck supple  Cardiovascular:      Rate and Rhythm: Normal rate and regular rhythm  Pulmonary:      Effort: Pulmonary effort is normal       Breath sounds: Normal breath sounds  Abdominal:      General: Bowel sounds are normal       Palpations: Abdomen is soft  Musculoskeletal: Normal range of motion  Skin:     General: Skin is warm and dry  Neurological:      Mental Status: He is alert and oriented to person, place, and time  Psychiatric:         Behavior: Behavior normal          Thought Content:  Thought content normal          Judgment: Judgment normal

## 2021-05-10 NOTE — ASSESSMENT & PLAN NOTE
Patient returns for routine scheduled follow-up status post right inguinal hernia repair and umbilical hernia repair with mesh  The patient denies all complaints referable to his abdomen  On physical examination the surgical wounds are clean dry and intact  No signs of early recurrent hernia formation either at the umbilicus or in the right groin  The technical details of surgery were again reviewed with the patient  A copy of the operative note provided to the patient for his permanent record  All questions were answered to the satisfaction of the patient  He has been encouraged to resume normal levels of activity as he sees fit  He has been encouraged to follow up with the practice at any point future with questions or concerns

## 2021-06-14 ENCOUNTER — OFFICE VISIT (OUTPATIENT)
Dept: UROLOGY | Facility: CLINIC | Age: 66
End: 2021-06-14
Payer: MEDICARE

## 2021-06-14 VITALS
DIASTOLIC BLOOD PRESSURE: 84 MMHG | SYSTOLIC BLOOD PRESSURE: 116 MMHG | HEART RATE: 75 BPM | HEIGHT: 68 IN | WEIGHT: 171 LBS | BODY MASS INDEX: 25.91 KG/M2

## 2021-06-14 DIAGNOSIS — R33.9 URINARY RETENTION: Primary | ICD-10-CM

## 2021-06-14 PROCEDURE — 51702 INSERT TEMP BLADDER CATH: CPT | Performed by: UROLOGY

## 2021-06-14 PROCEDURE — 99204 OFFICE O/P NEW MOD 45 MIN: CPT | Performed by: UROLOGY

## 2021-06-14 RX ORDER — FINASTERIDE 5 MG/1
5 TABLET, FILM COATED ORAL DAILY
Qty: 90 TABLET | Refills: 3 | Status: SHIPPED | OUTPATIENT
Start: 2021-06-14 | End: 2021-11-30

## 2021-06-14 NOTE — PROGRESS NOTES
UROLOGY NEW CONSULT NOTE     CHIEF COMPLAINT   Bro Mathews is a 74 y o  male with a complaint of   Chief Complaint   Patient presents with    urolift consult       History of Present Illness:     74 y o  male with reported longstanding lower urinary tract symptoms for at least the last 3 years  Patient denies family history of prostate cancer  He recently developed urinary retention requiring catheter placement  This was prior to a hernia repair at an outside institution  He saw urologist and underwent cystoscopy  He was referred for surgical management of his prostate, possible UroLift  Patient is still catheter dependent with a 12 [place]  He is on Flomax 2 pills at night  Lab Results   Component Value Date    PSA 0 3 03/08/2021    PSA 0 3 03/05/2021    PSA 0 3 08/10/2020       Past Medical History:     Past Medical History:   Diagnosis Date    Chronic kidney disease     Renal disorder        PAST SURGICAL HISTORY:     Past Surgical History:   Procedure Laterality Date    FRACTURE SURGERY Right     R   Hip Sx    MO REPAIR ING HERNIA,5+Y/O,REDUCIBL Right 4/27/2021    Procedure: OPEN INGUINAL HERNIA REPAIR WITH MESH;  Surgeon: Moise Mayorga MD;  Location: [address] [place] [address] OR;  Service: General    MO REPAIR UMBILICAL WJHP,2+M/P,SSEAS N/A 4/27/2021    Procedure: OPEN UMBILICAL HERNIA REPAIR WITH MESH;  Surgeon: Moise Mayorga MD;  Location: [address] [place] [address] OR;  Service: General       CURRENT MEDICATIONS:     Current Outpatient Medications   Medication Sig Dispense Refill    amphetamine-dextroamphetamine (ADDERALL, 30MG,) 30 MG tablet Take 10 mg by mouth 10mg tablets TID      tamsulosin (FLOMAX) 0 4 mg Take 0 4 mg by mouth 2 (two) times a day       finasteride (PROSCAR) 5 mg tablet Take 1 tablet (5 mg total) by mouth daily for 90 doses 90 tablet 3    oxyCODONE-acetaminophen (PERCOCET) 5-325 mg per tablet Take 1 tablet by mouth every 4 (four) hours as needed for moderate painMax Daily Amount: 6 tablets (Patient not taking: Reported on 6/14/2021) 20 tablet 0     No current facility-administered medications for this visit  Facility-Administered Medications Ordered in Other Visits   Medication Dose Route Frequency Provider Last Rate Last Admin    oxyCODONE-acetaminophen (PERCOCET) 5-325 mg per tablet 1 tablet  1 tablet Oral Once Sydni Davis, DO           ALLERGIES:   No Known Allergies    SOCIAL HISTORY:     Social History     Socioeconomic History    Marital status: Single     Spouse name: None    Number of children: None    Years of education: None    Highest education level: None   Occupational History    None   Tobacco Use    Smoking status: Former Smoker    Smokeless tobacco: Never Used   Vaping Use    Vaping Use: Never used   Substance and Sexual Activity    Alcohol use: Never    Drug use: Never    Sexual activity: Not Currently   Other Topics Concern    None   Social History Narrative    None     Social Determinants of Health     Financial Resource Strain:     Difficulty of Paying Living Expenses:    Food Insecurity:     Worried About Running Out of Food in the Last Year:     Ran Out of Food in the Last Year:    Transportation Needs:     Lack of Transportation (Medical):  Lack of Transportation (Non-Medical):    Physical Activity:     Days of Exercise per Week:     Minutes of Exercise per Session:    Stress:     Feeling of Stress :    Social Connections:     Frequency of Communication with Friends and Family:     Frequency of Social Gatherings with Friends and Family:     Attends Nondenominational Services:     Active Member of Clubs or Organizations:     Attends Club or Organization Meetings:     Marital Status:    Intimate Partner Violence:     Fear of Current or Ex-Partner:     Emotionally Abused:     Physically Abused:     Sexually Abused:        SOCIAL HISTORY:   History reviewed  No pertinent family history      REVIEW OF SYSTEMS:     Review of Systems Constitutional: Negative  Respiratory: Negative  Cardiovascular: Negative  Gastrointestinal: Negative  Genitourinary: Positive for difficulty urinating (  Ordonez dependent)  Musculoskeletal: Negative  Skin: Negative  Psychiatric/Behavioral: Negative  PHYSICAL EXAM:     /84 (BP Location: Left arm, Patient Position: Sitting, Cuff Size: Adult)   Pulse 75   Ht 5' 8" (1 727 m)   Wt 77 6 kg (171 lb)   BMI 26 00 kg/m²     General:  Healthy appearing male in no acute distress  They have a normal affect  There is not appear to be any gross neurologic defects or abnormalities  HEENT:  Normocephalic, atraumatic  Neck is supple without any palpable lymphadenopathy  Cardiovascular:  Patient has normal palpable distal radial pulses  There is no significant peripheral edema  No JVD is noted  Respiratory:  Patient has unlabored respirations  There is no audible wheeze or rhonchi  Abdomen: Abdomen is soft and nontender  There is no tympany  Inguinal and umbilical hernia are not appreciated  Genitourinary:  12 Croatian Ordonez, clear urine    Musculoskeletal:  Patient does not have significant CVA tenderness in the  flank with palpation or percussion  They full range of motion in all 4 extremities  Strength in all 4 extremities appears congruent  Patient is able to ambulate without assistance or difficulty  Dermatologic:  Patient has no skin abnormalities or rashes        LABS:     CBC:   Lab Results   Component Value Date    WBC 8 20 04/27/2021    HGB 16 2 04/27/2021    HCT 49 2 (H) 04/27/2021    MCV 88 04/27/2021     04/27/2021       BMP:   Lab Results   Component Value Date    CALCIUM 9 4 04/27/2021    K 3 9 04/27/2021    CO2 29 04/27/2021     04/27/2021    BUN 22 04/27/2021    CREATININE 1 64 (H) 04/27/2021       IMAGING:     3/11/21  RENAL ULTRASOUND     INDICATION:   N13 30: Unspecified hydronephrosis      COMPARISON: Renal ultrasound dated February 26, 2021      TECHNIQUE:   Ultrasound of the retroperitoneum was performed with a curvilinear transducer utilizing volumetric sweeps and still imaging techniques       FINDINGS:     KIDNEYS:  Symmetric and normal size  Right kidney:  11 9 x 7 3 x 5 8 cm  Left kidney:  12 4 x 7 1 x 5 3 cm      Right kidney  Normal echogenicity and contour  No suspicious masses detected  Mild hydronephrosis, markedly improved from prior exam   5 mm nonshadowing echogenic focus in the lower pole, possibly representing a small nonobstructing calculus  No perinephric fluid collections      Left kidney  Normal echogenicity and contour  No suspicious masses detected  Mild hydronephrosis, markedly improved from prior exam   No shadowing calculi  No perinephric fluid collections      URETERS:  Nonvisualized      BLADDER:   Decompressed with Ordonez catheter and not adequately evaluated  There appears to be a nodular area surrounding the Ordonez catheter , possibly representing an enlarged prostate gland; however, possibility of a bladder lesion is not excluded      IMPRESSION:        1  Mild bilateral hydronephrosis, markedly improved from February 26, 2021      2   Decompressed urinary bladder with Ordonez catheter in place, limiting optimal evaluation  There appears to be a nodular area surrounding the Ordonez catheter, possibly representing an enlarged prostate gland; however, possibility of a bladder lesion is   not excluded  PROCEDURE:      12 [place] catheter exchanged by nursing staff    ASSESSMENT:     74 y o  male with  Urinary retention    PLAN:     Patient has what appears to be a more chronic urinary retention with mild bilateral hydronephrosis  He reports he had renal failure at the time of catheter placement  He underwent an outside cystoscopy which I do not have records of    In order to best plan for operative intervention, I have recommended the patient return for cystoscopy and transrectal ultrasound measurement of his prostate with uroflow values for voiding  This will help us determine best options to manage the patient's urinary retention  We did not discuss UroLift surgery today at length but will certainly discuss best surgical options after his upcoming appointment  Patient remains on Flomax 2 pills daily and I have added Proscar for maximum medical management  Certainly if we are not going to use the Proscar for long-term outlet improvement, it will help to reduce bleeding complications at the time of surgical intervention  Patient is comfortable with the plan

## 2021-07-21 ENCOUNTER — APPOINTMENT (OUTPATIENT)
Dept: LAB | Facility: CLINIC | Age: 66
End: 2021-07-21
Payer: MEDICARE

## 2021-07-21 DIAGNOSIS — N18.30 STAGE 3 CHRONIC KIDNEY DISEASE, UNSPECIFIED WHETHER STAGE 3A OR 3B CKD (HCC): ICD-10-CM

## 2021-07-21 LAB
ANION GAP SERPL CALCULATED.3IONS-SCNC: 5 MMOL/L (ref 4–13)
BUN SERPL-MCNC: 35 MG/DL (ref 5–25)
CALCIUM SERPL-MCNC: 9.2 MG/DL (ref 8.3–10.1)
CHLORIDE SERPL-SCNC: 105 MMOL/L (ref 100–108)
CO2 SERPL-SCNC: 25 MMOL/L (ref 21–32)
CREAT SERPL-MCNC: 1.36 MG/DL (ref 0.6–1.3)
GFR SERPL CREATININE-BSD FRML MDRD: 53 ML/MIN/1.73SQ M
GLUCOSE SERPL-MCNC: 90 MG/DL (ref 65–140)
POTASSIUM SERPL-SCNC: 4.1 MMOL/L (ref 3.5–5.3)
SODIUM SERPL-SCNC: 135 MMOL/L (ref 136–145)

## 2021-07-21 PROCEDURE — 36415 COLL VENOUS BLD VENIPUNCTURE: CPT

## 2021-07-21 PROCEDURE — 80048 BASIC METABOLIC PNL TOTAL CA: CPT

## 2021-07-22 ENCOUNTER — TELEPHONE (OUTPATIENT)
Dept: UROLOGY | Facility: CLINIC | Age: 66
End: 2021-07-22

## 2021-07-22 NOTE — TELEPHONE ENCOUNTER
Called [phone] and left a message foe patient stating need to reschedule his appointment from 08/03/2021 to 08/11/2021  Asked patient to please call the office to speak with myself about a time

## 2021-07-23 NOTE — TELEPHONE ENCOUNTER
Called and spoke with patient  He is aware of appointment change and confirmed new appt date and time

## 2021-07-23 NOTE — TELEPHONE ENCOUNTER
Called [phone] and left message for patient stating his appointment on 08/03/2021 at 1000 has been rescheduled to 08/12/2021 at 1000 with Dr Mariama Oneil at the [facility] office   Asked patient to call the office to confirm

## 2021-08-05 ENCOUNTER — HOSPITAL ENCOUNTER (EMERGENCY)
Facility: HOSPITAL | Age: 66
Discharge: HOME | End: 2021-08-05
Attending: EMERGENCY MEDICINE
Payer: MEDICARE

## 2021-08-05 VITALS
RESPIRATION RATE: 16 BRPM | OXYGEN SATURATION: 96 % | HEIGHT: 68 IN | HEART RATE: 61 BPM | BODY MASS INDEX: 25.91 KG/M2 | TEMPERATURE: 98 F | DIASTOLIC BLOOD PRESSURE: 88 MMHG | SYSTOLIC BLOOD PRESSURE: 157 MMHG | WEIGHT: 171 LBS

## 2021-08-05 DIAGNOSIS — N39.0 UTI (URINARY TRACT INFECTION): ICD-10-CM

## 2021-08-05 DIAGNOSIS — T83.9XXA PROBLEM WITH FOLEY CATHETER, INITIAL ENCOUNTER (HCC): Primary | ICD-10-CM

## 2021-08-05 LAB
BACTERIA UR QL AUTO: ABNORMAL /HPF
BILIRUB UR QL STRIP: NEGATIVE
CLARITY UR: ABNORMAL
COLOR UR: YELLOW
GLUCOSE UR STRIP-MCNC: NEGATIVE MG/DL
HGB UR QL STRIP.AUTO: ABNORMAL
KETONES UR STRIP-MCNC: NEGATIVE MG/DL
LEUKOCYTE ESTERASE UR QL STRIP: ABNORMAL
NITRITE UR QL STRIP: POSITIVE
NON-SQ EPI CELLS URNS QL MICRO: ABNORMAL /HPF
PH UR STRIP.AUTO: 6.5 [PH]
PROT UR STRIP-MCNC: ABNORMAL MG/DL
RBC #/AREA URNS AUTO: ABNORMAL /HPF
SP GR UR STRIP.AUTO: 1.02 (ref 1–1.03)
UROBILINOGEN UR QL STRIP.AUTO: 0.2 E.U./DL
WBC #/AREA URNS AUTO: ABNORMAL /HPF

## 2021-08-05 PROCEDURE — 87077 CULTURE AEROBIC IDENTIFY: CPT | Performed by: EMERGENCY MEDICINE

## 2021-08-05 PROCEDURE — 87086 URINE CULTURE/COLONY COUNT: CPT | Performed by: EMERGENCY MEDICINE

## 2021-08-05 PROCEDURE — 99283 EMERGENCY DEPT VISIT LOW MDM: CPT

## 2021-08-05 PROCEDURE — 99284 EMERGENCY DEPT VISIT MOD MDM: CPT | Performed by: EMERGENCY MEDICINE

## 2021-08-05 PROCEDURE — 81001 URINALYSIS AUTO W/SCOPE: CPT | Performed by: EMERGENCY MEDICINE

## 2021-08-05 PROCEDURE — 87186 SC STD MICRODIL/AGAR DIL: CPT | Performed by: EMERGENCY MEDICINE

## 2021-08-05 RX ORDER — CEPHALEXIN 500 MG/1
500 CAPSULE ORAL EVERY 6 HOURS SCHEDULED
Qty: 56 CAPSULE | Refills: 0 | Status: SHIPPED | OUTPATIENT
Start: 2021-08-05 | End: 2021-08-13

## 2021-08-05 NOTE — ED PROVIDER NOTES
History  Chief Complaint   Patient presents with    Urinary Catheter Problem     patient reports having a possible blocked catheter     Patient is a 12-year-old male with history of chronic kidney disease, chronic indwelling giles presenting with intermittent giles clogging  No f/v/c/v/d/abd pain  Abdominal Pain  Pain location:  Suprapubic  Pain quality: pressure    Pain radiates to:  Does not radiate  Pain severity:  No pain  Onset quality:  Gradual  Timing:  Intermittent  Progression:  Waxing and waning  Chronicity:  Recurrent  Relieved by: adjusting giles  Worsened by:  Nothing  Ineffective treatments:  None tried  Associated symptoms: no chest pain, no chills, no cough, no diarrhea, no dysuria, no fever, no nausea, no sore throat and no vomiting        Prior to Admission Medications   Prescriptions Last Dose Informant Patient Reported? Taking? amphetamine-dextroamphetamine (ADDERALL, 30MG,) 30 MG tablet  Self Yes No   Sig: Take 10 mg by mouth 10mg tablets TID   finasteride (PROSCAR) 5 mg tablet   No No   Sig: Take 1 tablet (5 mg total) by mouth daily for 90 doses   oxyCODONE-acetaminophen (PERCOCET) 5-325 mg per tablet  Self No No   Sig: Take 1 tablet by mouth every 4 (four) hours as needed for moderate painMax Daily Amount: 6 tablets   Patient not taking: Reported on 6/14/2021   tamsulosin (FLOMAX) 0 4 mg  Self Yes No   Sig: Take 0 4 mg by mouth 2 (two) times a day       Facility-Administered Medications: None       Past Medical History:   Diagnosis Date    Chronic kidney disease     Renal disorder        Past Surgical History:   Procedure Laterality Date    FRACTURE SURGERY Right     R   Hip Sx    SC REPAIR ING HERNIA,5+Y/O,REDUCIBL Right 4/27/2021    Procedure: OPEN INGUINAL HERNIA REPAIR WITH MESH;  Surgeon: Charito Conti MD;  Location: [address] [place] [address] OR;  Service: General    SC REPAIR UMBILICAL ACWO,1+W/Z,MAVDX N/A 4/27/2021    Procedure: OPEN UMBILICAL HERNIA REPAIR WITH MESH;  Surgeon: Sánchez Riggs Merry Esteban MD;  Location: [facility] MAIN OR;  Service: General       History reviewed  No pertinent family history  I have reviewed and agree with the history as documented  E-Cigarette/Vaping    E-Cigarette Use Never User      E-Cigarette/Vaping Substances    Nicotine No     THC No     CBD No     Flavoring No     Other No     Unknown No      Social History     Tobacco Use    Smoking status: Former Smoker    Smokeless tobacco: Never Used   Vaping Use    Vaping Use: Never used   Substance Use Topics    Alcohol use: Never    Drug use: Never       Review of Systems   Constitutional: Negative for chills and fever  HENT: Negative for congestion, nosebleeds, rhinorrhea and sore throat  Eyes: Negative for pain and visual disturbance  Respiratory: Negative for cough and wheezing  Cardiovascular: Negative for chest pain and leg swelling  Gastrointestinal: Negative for abdominal distention, abdominal pain, diarrhea, nausea and vomiting  Genitourinary: Negative for dysuria and frequency  Musculoskeletal: Negative for back pain and joint swelling  Skin: Negative for rash and wound  Neurological: Negative for weakness and numbness  Psychiatric/Behavioral: Negative for decreased concentration and suicidal ideas  Physical Exam  Physical Exam  Vitals and nursing note reviewed  Constitutional:       Appearance: He is well-developed  HENT:      Head: Normocephalic and atraumatic  Eyes:      Conjunctiva/sclera: Conjunctivae normal       Pupils: Pupils are equal, round, and reactive to light  Neck:      Trachea: No tracheal deviation  Cardiovascular:      Rate and Rhythm: Normal rate and regular rhythm  Heart sounds: Normal heart sounds  No murmur heard  Pulmonary:      Effort: Pulmonary effort is normal  No respiratory distress  Breath sounds: Normal breath sounds  No wheezing or rales  Abdominal:      General: Bowel sounds are normal  There is no distension  Palpations: Abdomen is soft  Tenderness: There is no abdominal tenderness  Genitourinary:     Comments: Ordonez in place  Musculoskeletal:         General: No deformity  Cervical back: Normal range of motion and neck supple  Skin:     General: Skin is warm and dry  Capillary Refill: Capillary refill takes less than 2 seconds  Neurological:      Mental Status: He is alert and oriented to person, place, and time  Sensory: No sensory deficit  Psychiatric:         Judgment: Judgment normal          Vital Signs  ED Triage Vitals [08/05/21 0740]   Temperature Pulse Respirations Blood Pressure SpO2   (!) 96 1 °F (35 6 °C) 61 16 157/88 96 %      Temp Source Heart Rate Source Patient Position - Orthostatic VS BP Location FiO2 (%)   Tympanic Monitor Sitting Left arm --      Pain Score       --           Vitals:    08/05/21 0740   BP: 157/88   Pulse: 61   Patient Position - Orthostatic VS: Sitting         Visual Acuity      ED Medications  Medications - No data to display    Diagnostic Studies  Results Reviewed     Procedure Component Value Units Date/Time    Urine Microscopic [[id number]]  (Abnormal) Collected: 08/05/21 0758    Lab Status: Final result Specimen: Urine, Indwelling Ordonez Catheter Updated: 08/05/21 0852     RBC, UA 4-10 /hpf      WBC, UA 30-50 /hpf      Epithelial Cells Occasional /hpf      Bacteria, UA Innumerable /hpf     Urine culture [[id number]] Collected: 08/05/21 0758    Lab Status:  In process Specimen: Urine, Indwelling Ordonez Catheter Updated: 08/05/21 0852    UA w Reflex to Microscopic w Reflex to Culture [[id number]]  (Abnormal) Collected: 08/05/21 0758    Lab Status: Final result Specimen: Urine, Indwelling Ordonez Catheter Updated: 08/05/21 0821     Color, UA Yellow     Clarity, UA Cloudy     Specific [place], UA 1 020     pH, UA 6 5     Leukocytes, UA 2+     Nitrite, UA Positive     Protein, UA Trace mg/dl      Glucose, UA Negative mg/dl      Ketones, UA Negative mg/dl Urobilinogen, UA 0 2 E U /dl      Bilirubin, UA Negative     Blood, UA 2+                 No orders to display              Procedures  Procedures         ED Course  ED Course as of Aug 05 1602   Thu Aug 05, 2021   1908 Giles likely clogged, will replace and send UA after it is replaced                                SBIRT 20yo+      Most Recent Value   SBIRT (23 yo +)   In order to provide better care to our patients, we are screening all of our patients for alcohol and drug use  Would it be okay to ask you these screening questions? Yes Filed at: 08/05/2021 0849   Initial Alcohol Screen: US AUDIT-C    1  How often do you have a drink containing alcohol?  0 Filed at: 08/05/2021 0849   2  How many drinks containing alcohol do you have on a typical day you are drinking? 0 Filed at: 08/05/2021 0849   3a  Male UNDER 65: How often do you have five or more drinks on one occasion? 0 Filed at: 08/05/2021 0849   3b  FEMALE Any Age, or MALE 65+: How often do you have 4 or more drinks on one occassion? 0 Filed at: 08/05/2021 0849   Audit-C Score  0 Filed at: 08/05/2021 8910   IVANA: How many times in the past year have you    Used an illegal drug or used a prescription medication for non-medical reasons?   Never Filed at: 08/05/2021 0849                    MDM  Number of Diagnoses or Management Options  Problem with Giles catheter, initial encounter [facility]): new and requires workup  UTI (urinary tract infection): new and requires workup  Diagnosis management comments: Malfunctioning giles replaced with 16Fr relieved symptoms  UTI without systemic signs  Urology f/u       Amount and/or Complexity of Data Reviewed  Review and summarize past medical records: yes    Risk of Complications, Morbidity, and/or Mortality  Presenting problems: low  Diagnostic procedures: minimal  Management options: high        Disposition  Final diagnoses:   Problem with Giles catheter, initial encounter ([facility] )   UTI (urinary tract infection) Time reflects when diagnosis was documented in both MDM as applicable and the Disposition within this note     Time User Action Codes Description Comment    8/5/2021  7:39 AM Johanne Levo Add [T83  9XXA] Problem with Ordonez catheter, initial encounter ([address] )     8/5/2021  8:39 AM Johanne Levo Add [N39 0] UTI (urinary tract infection)       ED Disposition     ED Disposition Condition Date/Time Comment    Discharge Stable Thu Aug 5, 2021  8:39 AM Maine Saxena discharge to home/self care  Follow-up Information     Follow up With Specialties Details Why Contact Info    Sanket Lang MD Urology Schedule an appointment as soon as possible for a visit   [address]  [address]  [phone]            Discharge Medication List as of 8/5/2021  8:40 AM      START taking these medications    Details   cephalexin (KEFLEX) 500 mg capsule Take 1 capsule (500 mg total) by mouth every 6 (six) hours for 14 days, Starting u 8/5/2021, Until u 8/19/2021, Normal         CONTINUE these medications which have NOT CHANGED    Details   amphetamine-dextroamphetamine (ADDERALL, 30MG,) 30 MG tablet Take 10 mg by mouth 10mg tablets TID, Historical Med      finasteride (PROSCAR) 5 mg tablet Take 1 tablet (5 mg total) by mouth daily for 90 doses, Starting Mon 6/14/2021, Until Sun 9/12/2021, Normal      oxyCODONE-acetaminophen (PERCOCET) 5-325 mg per tablet Take 1 tablet by mouth every 4 (four) hours as needed for moderate painMax Daily Amount: 6 tablets, Starting Tue 4/27/2021, Normal      tamsulosin (FLOMAX) 0 4 mg Take 0 4 mg by mouth 2 (two) times a day , Starting Mon 3/1/2021, Historical Med           No discharge procedures on file      PDMP Review       Value Time User    PDMP Reviewed  Yes 4/27/2021  9:18 PM Nanci Fang MD          ED Provider  Electronically Signed by           Justin Escudero DO  08/05/21 4610

## 2021-08-08 LAB
BACTERIA UR CULT: ABNORMAL
BACTERIA UR CULT: ABNORMAL

## 2021-08-08 RX ORDER — SULFAMETHOXAZOLE AND TRIMETHOPRIM 800; 160 MG/1; MG/1
1 TABLET ORAL 2 TIMES DAILY
Qty: 14 TABLET | Refills: 0 | Status: SHIPPED | OUTPATIENT
Start: 2021-08-08 | End: 2021-08-15

## 2021-08-13 ENCOUNTER — PROCEDURE VISIT (OUTPATIENT)
Dept: UROLOGY | Facility: CLINIC | Age: 66
End: 2021-08-13
Payer: MEDICARE

## 2021-08-13 VITALS
WEIGHT: 176 LBS | HEART RATE: 82 BPM | HEIGHT: 68 IN | BODY MASS INDEX: 26.67 KG/M2 | SYSTOLIC BLOOD PRESSURE: 128 MMHG | DIASTOLIC BLOOD PRESSURE: 86 MMHG

## 2021-08-13 DIAGNOSIS — R33.9 URINARY RETENTION: Primary | ICD-10-CM

## 2021-08-13 PROCEDURE — 1124F ACP DISCUSS-NO DSCNMKR DOCD: CPT | Performed by: UROLOGY

## 2021-08-13 PROCEDURE — 99215 OFFICE O/P EST HI 40 MIN: CPT | Performed by: UROLOGY

## 2021-08-13 PROCEDURE — 52000 CYSTOURETHROSCOPY: CPT | Performed by: UROLOGY

## 2021-08-13 PROCEDURE — 51798 US URINE CAPACITY MEASURE: CPT | Performed by: UROLOGY

## 2021-08-13 PROCEDURE — 76872 US TRANSRECTAL: CPT | Performed by: UROLOGY

## 2021-08-13 NOTE — PROGRESS NOTES
UROLOGY PROCEDURE NOTE     CHIEF COMPLAINT   Franca Espinosa is a 74 y o  male with a complaint of   Chief Complaint   Patient presents with    Cystoscopy    Urinary Retention       History of Present Illness:     74 y o  male with reported longstanding lower urinary tract symptoms for at least the last 3 years  Patient denies family history of prostate cancer  He recently developed urinary retention requiring catheter placement  This was prior to a hernia repair at an outside institution  He saw urologist and underwent cystoscopy  He was referred for surgical management of his prostate, possible UroLift  Patient is still catheter dependent with a 12 [place]  He is on Flomax 2 pills at night  After our initial visit, the patient agreed to return for cystoscopy with transrectal ultrasound measurement of his prostate with uroflow  He agreed to add Proscar at the time of his visit in June  Lab Results   Component Value Date    PSA 0 3 03/08/2021    PSA 0 3 03/05/2021    PSA 0 3 08/10/2020       Patient returns at interval   Continues to be catheter dependent  Recent urinary tract infection was incompletely treated and the patient had to be adjusted on his antibiotics  He is now on Bactrim and returns today for cystoscopy, transrectal ultrasound measurement of his prostate and outlet eval     Past Medical History:     Past Medical History:   Diagnosis Date    Chronic kidney disease     Renal disorder     Retention of urine     Urinary retention        PAST SURGICAL HISTORY:     Past Surgical History:   Procedure Laterality Date    FRACTURE SURGERY Right     R   Hip Sx    [place] REPAIR ING HERNIA,5+Y/O,REDUCIBL Right 4/27/2021    Procedure: OPEN INGUINAL HERNIA REPAIR WITH MESH;  Surgeon: Elie Carson MD;  Location: [address] [place] [address];  Service: General    [place] REPAIR UMBILICAL AHOL,0+F/C,RZJMW N/A 4/27/2021    Procedure: OPEN UMBILICAL HERNIA REPAIR WITH MESH;  Surgeon: Elie Carson MD;  Location: [facility] MAIN OR;  Service: General       CURRENT MEDICATIONS:     Current Outpatient Medications   Medication Sig Dispense Refill    amphetamine-dextroamphetamine (ADDERALL, 30MG,) 30 MG tablet Take 10 mg by mouth 10mg tablets TID      cephalexin (KEFLEX) 500 mg capsule Take 1 capsule (500 mg total) by mouth every 6 (six) hours for 14 days 56 capsule 0    finasteride (PROSCAR) 5 mg tablet Take 1 tablet (5 mg total) by mouth daily for 90 doses 90 tablet 3    tamsulosin (FLOMAX) 0 4 mg Take 0 4 mg by mouth 2 (two) times a day       oxyCODONE-acetaminophen (PERCOCET) 5-325 mg per tablet Take 1 tablet by mouth every 4 (four) hours as needed for moderate painMax Daily Amount: 6 tablets (Patient not taking: Reported on 6/14/2021) 20 tablet 0    sulfamethoxazole-trimethoprim (BACTRIM DS) 800-160 mg per tablet Take 1 tablet by mouth 2 (two) times a day for 7 days smx-tmp DS (BACTRIM) 800-160 mg tabs (1tab q12 D10) (Patient not taking: Reported on 8/12/2021) 14 tablet 0     No current facility-administered medications for this visit       Facility-Administered Medications Ordered in Other Visits   Medication Dose Route Frequency Provider Last Rate Last Admin    oxyCODONE-acetaminophen (PERCOCET) 5-325 mg per tablet 1 tablet  1 tablet Oral Once Yaneli Puls, DO           ALLERGIES:   No Known Allergies    SOCIAL HISTORY:     Social History     Socioeconomic History    Marital status: Single     Spouse name: None    Number of children: None    Years of education: None    Highest education level: None   Occupational History    None   Tobacco Use    Smoking status: Former Smoker    Smokeless tobacco: Never Used   Vaping Use    Vaping Use: Never used   Substance and Sexual Activity    Alcohol use: Never    Drug use: Never    Sexual activity: Not Currently   Other Topics Concern    None   Social History Narrative    None     Social Determinants of Health     Financial Resource Strain:     Difficulty of Paying Living Expenses:    Food Insecurity:     Worried About [facility] in the Last Year:     [address] [facility] [address] in the Last Year:    Transportation Needs:     Lack of Transportation (Medical):  Lack of Transportation (Non-Medical):    Physical Activity:     Days of Exercise per Week:     Minutes of Exercise per Session:    Stress:     Feeling of Stress :    Social Connections:     Frequency of Communication with Friends and Family:     Frequency of Social Gatherings with Friends and Family:     Attends Jew Services:     Active Member of Clubs or Organizations:     Attends Club or Organization Meetings:     Marital Status:    Intimate Partner Violence:     Fear of Current or Ex-Partner:     Emotionally Abused:     Physically Abused:     Sexually Abused:        SOCIAL HISTORY:   History reviewed  No pertinent family history  REVIEW OF SYSTEMS:     Review of Systems   Constitutional: Negative  Respiratory: Negative  Cardiovascular: Negative  Gastrointestinal: Negative  Genitourinary: Positive for difficulty urinating (  Ordonez dependent) and penile pain  Musculoskeletal: Negative  Skin: Negative  Psychiatric/Behavioral: Negative  PHYSICAL EXAM:     Ht 5' 8" (1 727 m)   Wt 79 8 kg (176 lb)   BMI 26 76 kg/m²     General:  Healthy appearing male in no acute distress  They have a normal affect  There is not appear to be any gross neurologic defects or abnormalities  HEENT:  Normocephalic, atraumatic  Neck is supple without any palpable lymphadenopathy  Cardiovascular:  Patient has normal palpable distal radial pulses  There is no significant peripheral edema  No JVD is noted  Respiratory:  Patient has unlabored respirations  There is no audible wheeze or rhonchi  Abdomen: Abdomen is soft and nontender  There is no tympany  Inguinal and umbilical hernia are not appreciated      Genitourinary:  12 Turkish Ordonez, clear urine    Musculoskeletal:  Patient does not have significant CVA tenderness in the  flank with palpation or percussion  They full range of motion in all 4 extremities  Strength in all 4 extremities appears congruent  Patient is able to ambulate without assistance or difficulty  Dermatologic:  Patient has no skin abnormalities or rashes        LABS:     CBC:   Lab Results   Component Value Date    WBC 8 20 04/27/2021    HGB 16 2 04/27/2021    HCT 49 2 (H) 04/27/2021    MCV 88 04/27/2021     04/27/2021       BMP:   Lab Results   Component Value Date    CALCIUM 9 2 07/21/2021    K 4 1 07/21/2021    CO2 25 07/21/2021     07/21/2021    BUN 35 (H) 07/21/2021    CREATININE 1 36 (H) 07/21/2021     Urine Culture >100,000 cfu/ml - Strain 1 Klebsiella oxytocaAbnormal        >100,000 cfu/ml - Strain 2 Klebsiella oxytocaAbnormal           Susceptibility     Klebsiella oxytoca (1)     ROSANNA     Amoxicillin + Clavulanate <=4/2 ug/ml Susceptible     Ampicillin ($$) >16 00 ug/ml Resistant     Ampicillin + Sulbactam ($) 8/4 ug/ml Susceptible     Aztreonam ($$$)  <=4 ug/ml Susceptible     Cefazolin ($) >16 00 ug/ml Resistant     Cefuroxime ($$) <=4 ug/ml Susceptible     Ciprofloxacin ($)  <=1 00 ug/ml Susceptible     Gentamicin ($$) <=1 ug/ml Susceptible     Levofloxacin ($) <=0 25 ug/ml Susceptible     Nitrofurantoin <=32 ug/ml Susceptible     Tetracycline <=4 ug/ml Susceptible     Tobramycin ($) <=1 ug/ml Susceptible     Trimethoprim + Sulfamethoxazole ($$$) <=2/38 ug/ml Susceptible     ZID Performed Yes             Susceptibility     Klebsiella oxytoca (2)     ROSANNA     Amoxicillin + Clavulanate <=4/2 ug/ml Susceptible     Ampicillin ($$) >16 00 ug/ml Resistant     Ampicillin + Sulbactam ($) 16/8 ug/ml Intermediate     Aztreonam ($$$)  <=4 ug/ml Susceptible     Cefazolin ($) >16 00 ug/ml Resistant     Cefuroxime ($$) <=4 ug/ml Susceptible     Ciprofloxacin ($)  <=1 00 ug/ml Susceptible     Ertapenem ($$$) <=0 5 ug/ml Susceptible     Gentamicin ($$) <=1 ug/ml Susceptible     Levofloxacin ($) <=0 25 ug/ml Susceptible     Nitrofurantoin <=32 ug/ml Susceptible     Tetracycline <=4 ug/ml Susceptible     Tobramycin ($) <=1 ug/ml Susceptible     Trimethoprim + Sulfamethoxazole ($$$) <=2/38 ug/ml Susceptible     ZID Performed Yes                   Specimen Collected: 08/05/21  7:58 AM          IMAGING:     3/11/21  RENAL ULTRASOUND     INDICATION:   N13 30: Unspecified hydronephrosis      COMPARISON: Renal ultrasound dated February 26, 2021      TECHNIQUE:   Ultrasound of the retroperitoneum was performed with a curvilinear transducer utilizing volumetric sweeps and still imaging techniques       FINDINGS:     KIDNEYS:  Symmetric and normal size  Right kidney:  11 9 x 7 3 x 5 8 cm  Left kidney:  12 4 x 7 1 x 5 3 cm      Right kidney  Normal echogenicity and contour  No suspicious masses detected  Mild hydronephrosis, markedly improved from prior exam   5 mm nonshadowing echogenic focus in the lower pole, possibly representing a small nonobstructing calculus  No perinephric fluid collections      Left kidney  Normal echogenicity and contour  No suspicious masses detected  Mild hydronephrosis, markedly improved from prior exam   No shadowing calculi  No perinephric fluid collections      URETERS:  Nonvisualized      BLADDER:   Decompressed with Ordonez catheter and not adequately evaluated  There appears to be a nodular area surrounding the Ordonez catheter , possibly representing an enlarged prostate gland; however, possibility of a bladder lesion is not excluded      IMPRESSION:        1  Mild bilateral hydronephrosis, markedly improved from February 26, 2021      2   Decompressed urinary bladder with Ordonez catheter in place, limiting optimal evaluation  There appears to be a nodular area surrounding the Ordonez catheter, possibly representing an enlarged prostate gland; however, possibility of a bladder lesion is   not excluded      PROCEDURE:     SEE NOTE    ASSESSMENT:     74 y o  male with  Urinary retention    PLAN:       Unfortunately, the patient continues to have inability to urinate  His prostate was small and minimally occlusive on today's cystoscopy and transrectal ultrasound  The patient has significant signs of trabeculation and bladder stones consistent with outlet obstruction or incomplete bladder emptying  I am concerned there may be an underlying neuromuscular component given the small minimally occlusive nature of the prostate  I discussed options with the patient which include upfront urodynamic testing to assess the neuromuscular function of the bladder  Discussed with him the option of surgical intervention via either UroLift or trans urethral resection of the prostate  Patient is inclined to move forward with surgical intervention with UroLift  He understands how the surgery is performed the inherent risks and benefits  He understands that in my opinion, there is a relatively high rate of failure to relieve the patient's retention as I am concerned that obstruction may not be the root cause  If he fails to void after UroLift, we could certainly consider moving forward with urodynamic testing at that point or trans urethral resection of the prostate  If the patient continues to have longstanding retention, urodynamics may be the most  Helpful way to assess for underlying issue  Patient understands my concerns but has requested moving forward with UroLift implantation  We will schedule this in the near future

## 2021-08-13 NOTE — PROGRESS NOTES
Cystoscopy     Date/Time 8/13/2021 12:12 PM     Performed by  Olena Navarro MD     Authorized by Olena Navarro MD      Universal Protocol:  Timeout called at: 8/13/2021 12:12 PM       Procedure Details:  Procedure type: cystoscopy    Patient tolerance: Patient tolerated the procedure well with no immediate complications    Additional Procedure Details:   Cystoscopy procedure note:    Risk and benefits of flexible cystoscopy were discussed  Informed consent was obtained  Ordonez was removed  The patient was placed in the supine position  His genitalia was prepped with Betadine and draped in a sterile fashion  Viscous 2% lidocaine jelly was instilled into the urethra and allowed dwell time for local anesthesia  Flexible cystoscopy was then performed using a 16F scope  The distal urethra and prostatic urethra were evaluated and were normal in course and caliber  Patient had a very small prostate with mild bilobar hypertrophy  There was definitely some coaptation but this was minimal  Once inside the bladder, it was carefully inspected in a 360 degree fashion  Patient had some small dependent bladder stones and there was areas of mucosal irritation consistent with Ordonez trauma  Both ureteral orifices in their orthotopic location were visualized with clear efflux of urine  Retroflexion for evaluation of the bladder neck was normal      Overall this was a negative cystoscopy  Patient was filled with 300 cc of fluid and began to feel significantly painful sensations of needing to void  Instillation was stopped and the scope was removed  Patient was then turned the left lateral decubitus position and the transrectal ultrasound probe was placed  Prostate was measured in 3 dimensions and was noted to be relatively small around 20 g  Patient was then given the opportunity to urinate    He was unable to do so and a 16 Turkmen catheter was placed into the bladder by the nursing staff

## 2021-08-18 ENCOUNTER — PREP FOR PROCEDURE (OUTPATIENT)
Dept: UROLOGY | Facility: CLINIC | Age: 66
End: 2021-08-18

## 2021-08-18 DIAGNOSIS — R33.9 URINARY RETENTION: Primary | ICD-10-CM

## 2021-08-18 RX ORDER — CEFAZOLIN SODIUM 1 G/50ML
1000 SOLUTION INTRAVENOUS ONCE
Status: CANCELLED | OUTPATIENT
Start: 2021-10-20 | End: 2021-08-18

## 2021-08-20 ENCOUNTER — TELEPHONE (OUTPATIENT)
Dept: UROLOGY | Facility: MEDICAL CENTER | Age: 66
End: 2021-08-20

## 2021-08-26 NOTE — TELEPHONE ENCOUNTER
Patient returned call  Patient is aware that the call may come from a private number  Please advise

## 2021-08-26 NOTE — TELEPHONE ENCOUNTER
I spoke to patient and scheduled surgery for 10/20/21 at [address] with Dr Plata Beat  I offered sooner surgery date with Dr Ivy Telles at [facility] but patient denied  He will have a urine culture prior to surgery  I am mailing him a surgery packet

## 2021-10-14 ENCOUNTER — APPOINTMENT (OUTPATIENT)
Dept: LAB | Facility: CLINIC | Age: 66
End: 2021-10-14
Payer: MEDICARE

## 2021-10-14 DIAGNOSIS — R39.89 SUSPECTED UTI: ICD-10-CM

## 2021-10-14 LAB
BACTERIA UR QL AUTO: ABNORMAL /HPF
BILIRUB UR QL STRIP: NEGATIVE
CLARITY UR: ABNORMAL
COLOR UR: YELLOW
CREAT UR-MCNC: 119 MG/DL
CREAT UR-MCNC: 119 MG/DL
GLUCOSE UR STRIP-MCNC: NEGATIVE MG/DL
HGB UR QL STRIP.AUTO: ABNORMAL
HYALINE CASTS #/AREA URNS LPF: ABNORMAL /LPF
KETONES UR STRIP-MCNC: NEGATIVE MG/DL
LEUKOCYTE ESTERASE UR QL STRIP: ABNORMAL
MICROALBUMIN UR-MCNC: 129 MG/L (ref 0–20)
MICROALBUMIN/CREAT 24H UR: 108 MG/G CREATININE (ref 0–30)
NITRITE UR QL STRIP: POSITIVE
NON-SQ EPI CELLS URNS QL MICRO: ABNORMAL /HPF
PH UR STRIP.AUTO: 6 [PH]
PROT UR STRIP-MCNC: ABNORMAL MG/DL
PROT UR-MCNC: 33 MG/DL
PROT/CREAT UR: 0.28 MG/G{CREAT} (ref 0–0.1)
RBC #/AREA URNS AUTO: ABNORMAL /HPF
SODIUM 24H UR-SCNC: 69 MOL/L
SP GR UR STRIP.AUTO: 1.02 (ref 1–1.03)
UROBILINOGEN UR QL STRIP.AUTO: 0.2 E.U./DL
WBC #/AREA URNS AUTO: ABNORMAL /HPF

## 2021-10-14 PROCEDURE — 82570 ASSAY OF URINE CREATININE: CPT

## 2021-10-14 PROCEDURE — 82043 UR ALBUMIN QUANTITATIVE: CPT

## 2021-10-14 PROCEDURE — 84156 ASSAY OF PROTEIN URINE: CPT

## 2021-10-14 PROCEDURE — 81001 URINALYSIS AUTO W/SCOPE: CPT

## 2021-10-14 PROCEDURE — 87086 URINE CULTURE/COLONY COUNT: CPT

## 2021-10-14 PROCEDURE — 87077 CULTURE AEROBIC IDENTIFY: CPT

## 2021-10-14 PROCEDURE — 84300 ASSAY OF URINE SODIUM: CPT

## 2021-10-14 PROCEDURE — 87186 SC STD MICRODIL/AGAR DIL: CPT

## 2021-10-14 RX ORDER — DEXTROAMPHETAMINE SACCHARATE, AMPHETAMINE ASPARTATE, DEXTROAMPHETAMINE SULFATE AND AMPHETAMINE SULFATE 5; 5; 5; 5 MG/1; MG/1; MG/1; MG/1
20 TABLET ORAL
COMMUNITY

## 2021-10-14 RX ORDER — MULTIVITAMIN
1 TABLET ORAL DAILY
COMMUNITY

## 2021-10-16 LAB — BACTERIA UR CULT: ABNORMAL

## 2021-10-18 ENCOUNTER — TELEPHONE (OUTPATIENT)
Dept: UROLOGY | Facility: CLINIC | Age: 66
End: 2021-10-18

## 2021-10-18 ENCOUNTER — ANESTHESIA EVENT (OUTPATIENT)
Dept: PERIOP | Facility: HOSPITAL | Age: 66
End: 2021-10-18
Payer: MEDICARE

## 2021-10-20 ENCOUNTER — HOSPITAL ENCOUNTER (OUTPATIENT)
Facility: HOSPITAL | Age: 66
Setting detail: OUTPATIENT SURGERY
Discharge: HOME | End: 2021-10-20
Attending: UROLOGY | Admitting: UROLOGY
Payer: MEDICARE

## 2021-10-20 ENCOUNTER — ANESTHESIA (OUTPATIENT)
Dept: PERIOP | Facility: HOSPITAL | Age: 66
End: 2021-10-20
Payer: MEDICARE

## 2021-10-20 ENCOUNTER — TELEPHONE (OUTPATIENT)
Dept: UROLOGY | Facility: CLINIC | Age: 66
End: 2021-10-20

## 2021-10-20 VITALS
RESPIRATION RATE: 16 BRPM | WEIGHT: 175 LBS | DIASTOLIC BLOOD PRESSURE: 74 MMHG | SYSTOLIC BLOOD PRESSURE: 142 MMHG | HEART RATE: 65 BPM | HEIGHT: 68 IN | OXYGEN SATURATION: 97 % | BODY MASS INDEX: 26.52 KG/M2 | TEMPERATURE: 97.6 F

## 2021-10-20 DIAGNOSIS — R33.9 URINARY RETENTION: Primary | ICD-10-CM

## 2021-10-20 PROCEDURE — NC001 PR NO CHARGE: Performed by: UROLOGY

## 2021-10-20 PROCEDURE — 52441 CYSTO INSJ TRNSPRSTC 1 IMPLT: CPT | Performed by: UROLOGY

## 2021-10-20 PROCEDURE — 52442 CYSTO INS TRNSPRSTC IMPLT EA: CPT | Performed by: UROLOGY

## 2021-10-20 PROCEDURE — L8699 PROSTHETIC IMPLANT NOS: HCPCS | Performed by: UROLOGY

## 2021-10-20 DEVICE — IMPLANT URO PROSTATE UROLIFT: Type: IMPLANTABLE DEVICE | Site: URETHRA | Status: FUNCTIONAL

## 2021-10-20 RX ORDER — ONDANSETRON 2 MG/ML
INJECTION INTRAMUSCULAR; INTRAVENOUS AS NEEDED
Status: DISCONTINUED | OUTPATIENT
Start: 2021-10-20 | End: 2021-10-20

## 2021-10-20 RX ORDER — SUCCINYLCHOLINE/SOD CL,ISO/PF 100 MG/5ML
SYRINGE (ML) INTRAVENOUS AS NEEDED
Status: DISCONTINUED | OUTPATIENT
Start: 2021-10-20 | End: 2021-10-20

## 2021-10-20 RX ORDER — LIDOCAINE HYDROCHLORIDE 20 MG/ML
INJECTION, SOLUTION EPIDURAL; INFILTRATION; INTRACAUDAL; PERINEURAL AS NEEDED
Status: DISCONTINUED | OUTPATIENT
Start: 2021-10-20 | End: 2021-10-20

## 2021-10-20 RX ORDER — FENTANYL CITRATE 50 UG/ML
INJECTION, SOLUTION INTRAMUSCULAR; INTRAVENOUS AS NEEDED
Status: DISCONTINUED | OUTPATIENT
Start: 2021-10-20 | End: 2021-10-20

## 2021-10-20 RX ORDER — HYDROCODONE BITARTRATE AND ACETAMINOPHEN 5; 325 MG/1; MG/1
1 TABLET ORAL EVERY 6 HOURS PRN
Status: DISCONTINUED | OUTPATIENT
Start: 2021-10-20 | End: 2021-10-20 | Stop reason: HOSPADM

## 2021-10-20 RX ORDER — HYDROCODONE BITARTRATE AND ACETAMINOPHEN 5; 325 MG/1; MG/1
1 TABLET ORAL EVERY 6 HOURS PRN
Qty: 5 TABLET | Refills: 0 | Status: SHIPPED | OUTPATIENT
Start: 2021-10-20

## 2021-10-20 RX ORDER — SODIUM CHLORIDE 9 MG/ML
125 INJECTION, SOLUTION INTRAVENOUS CONTINUOUS
Status: DISCONTINUED | OUTPATIENT
Start: 2021-10-20 | End: 2021-10-20 | Stop reason: HOSPADM

## 2021-10-20 RX ORDER — FENTANYL CITRATE/PF 50 MCG/ML
25 SYRINGE (ML) INJECTION
Status: DISCONTINUED | OUTPATIENT
Start: 2021-10-20 | End: 2021-10-20 | Stop reason: HOSPADM

## 2021-10-20 RX ORDER — ROCURONIUM BROMIDE 10 MG/ML
INJECTION, SOLUTION INTRAVENOUS AS NEEDED
Status: DISCONTINUED | OUTPATIENT
Start: 2021-10-20 | End: 2021-10-20

## 2021-10-20 RX ORDER — TAMSULOSIN HYDROCHLORIDE 0.4 MG/1
0.4 CAPSULE ORAL ONCE
Status: COMPLETED | OUTPATIENT
Start: 2021-10-20 | End: 2021-10-20

## 2021-10-20 RX ORDER — LEVOFLOXACIN 5 MG/ML
750 INJECTION, SOLUTION INTRAVENOUS ONCE
Status: COMPLETED | OUTPATIENT
Start: 2021-10-20 | End: 2021-10-20

## 2021-10-20 RX ORDER — CEFAZOLIN SODIUM 1 G/50ML
1000 SOLUTION INTRAVENOUS ONCE
Status: DISCONTINUED | OUTPATIENT
Start: 2021-10-20 | End: 2021-10-20

## 2021-10-20 RX ORDER — PHENAZOPYRIDINE HYDROCHLORIDE 100 MG/1
100 TABLET, FILM COATED ORAL 3 TIMES DAILY PRN
Qty: 20 TABLET | Refills: 0 | Status: SHIPPED | OUTPATIENT
Start: 2021-10-20

## 2021-10-20 RX ORDER — MIDAZOLAM HYDROCHLORIDE 2 MG/2ML
INJECTION, SOLUTION INTRAMUSCULAR; INTRAVENOUS AS NEEDED
Status: DISCONTINUED | OUTPATIENT
Start: 2021-10-20 | End: 2021-10-20

## 2021-10-20 RX ORDER — ONDANSETRON 2 MG/ML
4 INJECTION INTRAMUSCULAR; INTRAVENOUS ONCE AS NEEDED
Status: DISCONTINUED | OUTPATIENT
Start: 2021-10-20 | End: 2021-10-20 | Stop reason: HOSPADM

## 2021-10-20 RX ORDER — DEXAMETHASONE SODIUM PHOSPHATE 4 MG/ML
INJECTION, SOLUTION INTRA-ARTICULAR; INTRALESIONAL; INTRAMUSCULAR; INTRAVENOUS; SOFT TISSUE AS NEEDED
Status: DISCONTINUED | OUTPATIENT
Start: 2021-10-20 | End: 2021-10-20

## 2021-10-20 RX ORDER — OXYBUTYNIN CHLORIDE 5 MG/1
5 TABLET, EXTENDED RELEASE ORAL DAILY
Status: DISCONTINUED | OUTPATIENT
Start: 2021-10-20 | End: 2021-10-20 | Stop reason: HOSPADM

## 2021-10-20 RX ORDER — ATROPA BELLADONNA AND OPIUM 16.2; 3 MG/1; MG/1
SUPPOSITORY RECTAL AS NEEDED
Status: DISCONTINUED | OUTPATIENT
Start: 2021-10-20 | End: 2021-10-20 | Stop reason: HOSPADM

## 2021-10-20 RX ORDER — PROPOFOL 10 MG/ML
INJECTION, EMULSION INTRAVENOUS AS NEEDED
Status: DISCONTINUED | OUTPATIENT
Start: 2021-10-20 | End: 2021-10-20

## 2021-10-20 RX ADMIN — LEVOFLOXACIN 750 MG: 5 INJECTION, SOLUTION INTRAVENOUS at 13:41

## 2021-10-20 RX ADMIN — ONDANSETRON 4 MG: 2 INJECTION INTRAMUSCULAR; INTRAVENOUS at 14:11

## 2021-10-20 RX ADMIN — SODIUM CHLORIDE: 0.9 INJECTION, SOLUTION INTRAVENOUS at 14:29

## 2021-10-20 RX ADMIN — Medication 100 MG: at 14:11

## 2021-10-20 RX ADMIN — MIDAZOLAM 2 MG: 1 INJECTION INTRAMUSCULAR; INTRAVENOUS at 14:02

## 2021-10-20 RX ADMIN — FENTANYL CITRATE 100 MCG: 50 INJECTION INTRAMUSCULAR; INTRAVENOUS at 14:11

## 2021-10-20 RX ADMIN — ROCURONIUM BROMIDE 5 MG: 50 INJECTION, SOLUTION INTRAVENOUS at 14:11

## 2021-10-20 RX ADMIN — TAMSULOSIN HYDROCHLORIDE 0.4 MG: 0.4 CAPSULE ORAL at 15:43

## 2021-10-20 RX ADMIN — OXYBUTYNIN CHLORIDE 5 MG: 5 TABLET, EXTENDED RELEASE ORAL at 15:43

## 2021-10-20 RX ADMIN — PROPOFOL 100 MG: 10 INJECTION, EMULSION INTRAVENOUS at 14:17

## 2021-10-20 RX ADMIN — SODIUM CHLORIDE 125 ML/HR: 0.9 INJECTION, SOLUTION INTRAVENOUS at 12:44

## 2021-10-20 RX ADMIN — HYDROCODONE BITARTRATE AND ACETAMINOPHEN 1 TABLET: 5; 325 TABLET ORAL at 15:45

## 2021-10-20 RX ADMIN — DEXAMETHASONE SODIUM PHOSPHATE 8 MG: 4 INJECTION INTRA-ARTICULAR; INTRALESIONAL; INTRAMUSCULAR; INTRAVENOUS; SOFT TISSUE at 14:11

## 2021-10-20 RX ADMIN — LIDOCAINE HYDROCHLORIDE 80 MG: 20 INJECTION, SOLUTION EPIDURAL; INFILTRATION; INTRACAUDAL; PERINEURAL at 14:11

## 2021-10-20 RX ADMIN — PROPOFOL 200 MG: 10 INJECTION, EMULSION INTRAVENOUS at 14:11

## 2021-10-21 ENCOUNTER — PROCEDURE VISIT (OUTPATIENT)
Dept: UROLOGY | Facility: CLINIC | Age: 66
End: 2021-10-21
Payer: MEDICARE

## 2021-10-21 VITALS
RESPIRATION RATE: 20 BRPM | BODY MASS INDEX: 27.89 KG/M2 | HEART RATE: 76 BPM | SYSTOLIC BLOOD PRESSURE: 140 MMHG | WEIGHT: 184 LBS | DIASTOLIC BLOOD PRESSURE: 80 MMHG | HEIGHT: 68 IN

## 2021-10-21 DIAGNOSIS — R33.9 URINARY RETENTION: Primary | ICD-10-CM

## 2021-10-21 LAB — POST-VOID RESIDUAL VOLUME, ML POC: 460 ML

## 2021-10-21 PROCEDURE — 51798 US URINE CAPACITY MEASURE: CPT

## 2021-10-21 PROCEDURE — 99211 OFF/OP EST MAY X REQ PHY/QHP: CPT

## 2021-10-21 PROCEDURE — 51702 INSERT TEMP BLADDER CATH: CPT

## 2021-11-30 ENCOUNTER — OFFICE VISIT (OUTPATIENT)
Dept: UROLOGY | Facility: CLINIC | Age: 66
End: 2021-11-30
Payer: MEDICARE

## 2021-11-30 VITALS
SYSTOLIC BLOOD PRESSURE: 140 MMHG | HEART RATE: 80 BPM | BODY MASS INDEX: 28.04 KG/M2 | DIASTOLIC BLOOD PRESSURE: 80 MMHG | WEIGHT: 185 LBS | HEIGHT: 68 IN | RESPIRATION RATE: 20 BRPM

## 2021-11-30 DIAGNOSIS — R33.9 URINARY RETENTION: Primary | ICD-10-CM

## 2021-11-30 LAB — POST-VOID RESIDUAL VOLUME, ML POC: 21 ML

## 2021-11-30 PROCEDURE — 99214 OFFICE O/P EST MOD 30 MIN: CPT | Performed by: PHYSICIAN ASSISTANT

## 2021-11-30 PROCEDURE — 51798 US URINE CAPACITY MEASURE: CPT | Performed by: PHYSICIAN ASSISTANT

## 2021-12-30 ENCOUNTER — HOSPITAL ENCOUNTER (EMERGENCY)
Facility: HOSPITAL | Age: 66
Discharge: HOME | End: 2021-12-30
Attending: EMERGENCY MEDICINE
Payer: MEDICARE

## 2021-12-30 VITALS
HEART RATE: 103 BPM | RESPIRATION RATE: 20 BRPM | OXYGEN SATURATION: 98 % | HEIGHT: 68 IN | WEIGHT: 180 LBS | SYSTOLIC BLOOD PRESSURE: 146 MMHG | BODY MASS INDEX: 27.28 KG/M2 | TEMPERATURE: 97.9 F | DIASTOLIC BLOOD PRESSURE: 84 MMHG

## 2021-12-30 DIAGNOSIS — R33.8 ACUTE URINARY RETENTION: Primary | ICD-10-CM

## 2021-12-30 LAB
BACTERIA UR QL AUTO: ABNORMAL /HPF
BILIRUB UR QL STRIP: NEGATIVE
CLARITY UR: ABNORMAL
COLOR UR: YELLOW
GLUCOSE UR STRIP-MCNC: NEGATIVE MG/DL
HGB UR QL STRIP.AUTO: ABNORMAL
KETONES UR STRIP-MCNC: NEGATIVE MG/DL
LEUKOCYTE ESTERASE UR QL STRIP: ABNORMAL
NITRITE UR QL STRIP: POSITIVE
NON-SQ EPI CELLS URNS QL MICRO: ABNORMAL /HPF
PH UR STRIP.AUTO: 6 [PH]
PROT UR STRIP-MCNC: ABNORMAL MG/DL
RBC #/AREA URNS AUTO: ABNORMAL /HPF
SP GR UR STRIP.AUTO: 1.02 (ref 1–1.03)
UROBILINOGEN UR QL STRIP.AUTO: 0.2 E.U./DL
WBC #/AREA URNS AUTO: ABNORMAL /HPF

## 2021-12-30 PROCEDURE — 87086 URINE CULTURE/COLONY COUNT: CPT | Performed by: EMERGENCY MEDICINE

## 2021-12-30 PROCEDURE — 99283 EMERGENCY DEPT VISIT LOW MDM: CPT

## 2021-12-30 PROCEDURE — 99284 EMERGENCY DEPT VISIT MOD MDM: CPT | Performed by: EMERGENCY MEDICINE

## 2021-12-30 PROCEDURE — 87077 CULTURE AEROBIC IDENTIFY: CPT | Performed by: EMERGENCY MEDICINE

## 2021-12-30 PROCEDURE — 87186 SC STD MICRODIL/AGAR DIL: CPT | Performed by: EMERGENCY MEDICINE

## 2021-12-30 PROCEDURE — 81001 URINALYSIS AUTO W/SCOPE: CPT | Performed by: EMERGENCY MEDICINE

## 2021-12-30 RX ORDER — CIPROFLOXACIN 500 MG/1
500 TABLET, FILM COATED ORAL ONCE
Status: COMPLETED | OUTPATIENT
Start: 2021-12-30 | End: 2021-12-30

## 2021-12-30 RX ORDER — CIPROFLOXACIN 500 MG/1
500 TABLET, FILM COATED ORAL 2 TIMES DAILY
Qty: 7 TABLET | Refills: 0 | Status: SHIPPED | OUTPATIENT
Start: 2021-12-30 | End: 2022-01-03

## 2021-12-30 RX ADMIN — CIPROFLOXACIN 500 MG: 500 TABLET, FILM COATED ORAL at 01:45

## 2021-12-30 NOTE — ED PROVIDER NOTES
History  Chief Complaint   Patient presents with    Difficulty Urinating     Had a Uro-lift a month ago and has not urinated right since  70-year-old male presents emergency room complaining of urinary retention  Patient states that he has had to strain to urinate for the past couple of weeks  He states a month ago he had the uro lift procedure for enlarged prostate  He states that it never really worked right and he was still having difficulty urinating  He wore a urinary leg bag and a catheter for over year prior to this  The patient notes that he called his urologist and he was referred to the ER  He came here twice before but because of the weight left  He notes that he can urinate but he has a lot of discomfort and has to force to try to get any urine to come out  He denies any fever chills  Prior to Admission Medications   Prescriptions Last Dose Informant Patient Reported? Taking?    Cholecalciferol (VITAMIN D3 PO)  Self Yes No   Sig: Take by mouth   GLUTATHIONE PO  Self Yes No   Sig: Take by mouth daily   HYDROcodone-acetaminophen (NORCO) 5-325 mg per tablet  Self No No   Sig: Take 1 tablet by mouth every 6 (six) hours as needed for pain for up to 5 dosesMax Daily Amount: 4 tablets   Multiple Vitamin (multivitamin) tablet  Self Yes No   Sig: Take 1 tablet by mouth daily   Multiple Vitamins-Minerals (ZINC PO)  Self Yes No   Sig: Take by mouth   QUERCETIN PO  Self Yes No   Sig: Take by mouth daily   amphetamine-dextroamphetamine (ADDERALL, 20MG,) 20 mg tablet  Self Yes No   Sig: Take 20 mg by mouth 2 (two) times a day 2 tabs Am and 1 tab PM   finasteride (PROSCAR) 5 mg tablet  Self No No   Sig: Take 1 tablet (5 mg total) by mouth daily for 90 doses   Patient taking differently: Take 5 mg by mouth every evening    oxyCODONE-acetaminophen (PERCOCET) 5-325 mg per tablet  Self No No   Sig: Take 1 tablet by mouth every 4 (four) hours as needed for moderate painMax Daily Amount: 6 tablets Patient not taking: Reported on 6/14/2021   phenazopyridine (PYRIDIUM) 100 mg tablet  Self No No   Sig: Take 1 tablet (100 mg total) by mouth 3 (three) times a day as needed for bladder spasms   tamsulosin (FLOMAX) 0 4 mg  Self Yes No   Sig: Take 0 4 mg by mouth daily with dinner 2 tabs      Facility-Administered Medications: None       Past Medical History:   Diagnosis Date    ADD (attention deficit disorder)     Benign prostatic hyperplasia     Chronic kidney disease     Dental crowns present     Depression     Exercises 3 to 4 times per week     History of 2019 novel coronavirus disease (COVID-19) 01/2021    recovered at home/'pretty mild symptoms like the flu"    Indwelling Ordonez catheter present     Renal disorder     Retention of urine     Urinary retention     Wears glasses     reading       Past Surgical History:   Procedure Laterality Date    FRACTURE SURGERY Right     R  Hip Sx; screw implanted    [place] CYSTOURETHRO W/IMPLANT N/A 10/20/2021    Procedure: CYSTOSCOPY WITH INSERTION Pasty Rajput;  Surgeon: Brittani Fernandez MD;  Location: [facility];  Service: Urology    [place] REPAIR [address] HERNIA,5+Y/O,REDUCIBL Right 4/27/2021    Procedure: OPEN INGUINAL HERNIA REPAIR WITH MESH;  Surgeon: Griffin Snyder MD;  Location: [facility] MAIN OR;  Service: General    [place] REPAIR UMBILICAL LHXY,0+X/X,YENQO N/A 4/27/2021    Procedure: OPEN UMBILICAL HERNIA REPAIR WITH MESH;  Surgeon: Griffin Snyder MD;  Location: [facility] MAIN OR;  Service: General    TONSILLECTOMY      WISDOM TOOTH EXTRACTION         History reviewed  No pertinent family history  I have reviewed and agree with the history as documented      E-Cigarette/Vaping    E-Cigarette Use Never User      E-Cigarette/Vaping Substances    Nicotine No     THC No     CBD No     Flavoring No     Other No     Unknown No      Social History     Tobacco Use    Smoking status: Former Smoker    Smokeless tobacco: Never Used   Vaping Use    Vaping Use: Never used Substance Use Topics    Alcohol use: Never    Drug use: Never       Review of Systems   Constitutional: Negative for chills and fever  HENT: Negative for ear pain and sore throat  Eyes: Negative for pain and visual disturbance  Respiratory: Negative for cough and shortness of breath  Cardiovascular: Negative for chest pain and palpitations  Gastrointestinal: Positive for abdominal pain  Negative for abdominal distention and vomiting  Genitourinary: Positive for decreased urine volume  Negative for dysuria and hematuria  Musculoskeletal: Negative for arthralgias and back pain  Skin: Negative for color change and rash  Neurological: Negative for seizures and syncope  All other systems reviewed and are negative  Physical Exam  Physical Exam  Vitals and nursing note reviewed  Constitutional:       General: He is not in acute distress  Appearance: He is well-developed  He is not ill-appearing or toxic-appearing  HENT:      Head: Normocephalic and atraumatic  Eyes:      Conjunctiva/sclera: Conjunctivae normal    Cardiovascular:      Rate and Rhythm: Normal rate and regular rhythm  Heart sounds: No murmur heard  Comments: Heart rate tachycardic at 103  Pulmonary:      Effort: Pulmonary effort is normal  No respiratory distress  Breath sounds: Normal breath sounds  Abdominal:      Palpations: Abdomen is soft  Tenderness: There is abdominal tenderness  Comments: Mild suprapubic tenderness  Musculoskeletal:      Cervical back: Neck supple  Skin:     General: Skin is warm and dry  Capillary Refill: Capillary refill takes less than 2 seconds  Neurological:      Mental Status: He is alert           Vital Signs  ED Triage Vitals [12/30/21 0033]   Temperature Pulse Respirations Blood Pressure SpO2   97 9 °F (36 6 °C) 103 20 146/84 98 %      Temp Source Heart Rate Source Patient Position - Orthostatic VS BP Location FiO2 (%)   Tympanic Monitor Sitting Left arm --      Pain Score       10 - Worst Possible Pain           Vitals:    12/30/21 0033   BP: 146/84   Pulse: 103   Patient Position - Orthostatic VS: Sitting         Visual Acuity      ED Medications  Medications   ciprofloxacin (CIPRO) tablet 500 mg (has no administration in time range)       Diagnostic Studies  Results Reviewed     Procedure Component Value Units Date/Time    UA w Reflex to Microscopic w Reflex to Culture [[id number]]     Lab Status: No result Specimen: Urine, Indwelling Ordonez Catheter                  No orders to display              Procedures  Procedures         ED Course  ED Course as of 12/30/21 0141   u Dec 30, 2021   0133 Patient feels relieved with 700 cc of urine out in the Ordonez bag  Will prophylax with Cipro for a few days due to catheter insertion and send urine for culture ring due to cloudy urine  Patient can follow up with his urologist next week for repeat evaluation  MDM    Disposition  Final diagnoses:   Acute urinary retention     Time reflects when diagnosis was documented in both MDM as applicable and the Disposition within this note     Time User Action Codes Description Comment    12/30/2021  1:30 AM Shree Castellanos Add [R33 8] Acute urinary retention       ED Disposition     ED Disposition Condition Date/Time Comment    Discharge Stable u Dec 30, 2021  1:30 AM Priyanka Hayden discharge to home/self care  Follow-up Information    None         Patient's Medications   Discharge Prescriptions    CIPROFLOXACIN (CIPRO) 500 MG TABLET    Take 1 tablet (500 mg total) by mouth 2 (two) times a day for 7 doses Start the medication on the evening of 12/30/2021 after dinner  Start Date: 12/30/2021End Date: 1/3/2022       Order Dose: 500 mg       Quantity: 7 tablet    Refills: 0       No discharge procedures on file      PDMP Review       Value Time User    PDMP Reviewed  Yes 4/27/2021  9:18 PM Elvia Lebron MD ED Provider  Electronically Signed by           Zeeshan Hicks DO  12/30/21 0611

## 2022-01-01 LAB — BACTERIA UR CULT: ABNORMAL

## 2022-02-20 ENCOUNTER — HOSPITAL ENCOUNTER (EMERGENCY)
Facility: HOSPITAL | Age: 67
Discharge: HOME | End: 2022-02-20
Attending: EMERGENCY MEDICINE
Payer: MEDICARE

## 2022-02-20 VITALS
SYSTOLIC BLOOD PRESSURE: 190 MMHG | BODY MASS INDEX: 27.28 KG/M2 | OXYGEN SATURATION: 98 % | TEMPERATURE: 97.4 F | RESPIRATION RATE: 20 BRPM | WEIGHT: 180 LBS | HEIGHT: 68 IN | DIASTOLIC BLOOD PRESSURE: 86 MMHG | HEART RATE: 70 BPM

## 2022-02-20 DIAGNOSIS — T83.9XXA PROBLEM WITH FOLEY CATHETER, INITIAL ENCOUNTER (HCC): ICD-10-CM

## 2022-02-20 DIAGNOSIS — N39.0 UTI (URINARY TRACT INFECTION): Primary | ICD-10-CM

## 2022-02-20 PROCEDURE — 99284 EMERGENCY DEPT VISIT MOD MDM: CPT | Performed by: EMERGENCY MEDICINE

## 2022-02-20 PROCEDURE — 87077 CULTURE AEROBIC IDENTIFY: CPT | Performed by: EMERGENCY MEDICINE

## 2022-02-20 PROCEDURE — 99283 EMERGENCY DEPT VISIT LOW MDM: CPT

## 2022-02-20 PROCEDURE — 81001 URINALYSIS AUTO W/SCOPE: CPT | Performed by: EMERGENCY MEDICINE

## 2022-02-20 PROCEDURE — 87186 SC STD MICRODIL/AGAR DIL: CPT | Performed by: EMERGENCY MEDICINE

## 2022-02-20 PROCEDURE — 87086 URINE CULTURE/COLONY COUNT: CPT | Performed by: EMERGENCY MEDICINE

## 2022-02-20 RX ORDER — AMOXICILLIN AND CLAVULANATE POTASSIUM 875; 125 MG/1; MG/1
1 TABLET, FILM COATED ORAL ONCE
Status: DISCONTINUED | OUTPATIENT
Start: 2022-02-20 | End: 2022-02-20 | Stop reason: HOSPADM

## 2022-02-20 RX ORDER — AMOXICILLIN AND CLAVULANATE POTASSIUM 875; 125 MG/1; MG/1
1 TABLET, FILM COATED ORAL EVERY 12 HOURS
Qty: 14 TABLET | Refills: 0 | Status: SHIPPED | OUTPATIENT
Start: 2022-02-20 | End: 2022-02-27

## 2022-02-20 NOTE — ED PROVIDER NOTES
History  Chief Complaint   Patient presents with    Urinary Catheter Problem     blocked catheter     75-year-old male presents emergency room complaining of suprapubic abdominal discomfort and pain at the tip of his penis from the catheter that is been in place for over 2 months  The patient notes that he has not followed up with the urologist and has a history of enlarged prostate and urinary retention  Patient is here for evaluation due to increasing discomfort  The patient notes that there is no good drainage of urine into the bag  Patient is uncomfortable and pacing in the triage room  Prior to Admission Medications   Prescriptions Last Dose Informant Patient Reported? Taking?    Cholecalciferol (VITAMIN D3 PO)  Self Yes No   Sig: Take by mouth   GLUTATHIONE PO  Self Yes No   Sig: Take by mouth daily   HYDROcodone-acetaminophen (NORCO) 5-325 mg per tablet  Self No No   Sig: Take 1 tablet by mouth every 6 (six) hours as needed for pain for up to 5 dosesMax Daily Amount: 4 tablets   Multiple Vitamin (multivitamin) tablet  Self Yes No   Sig: Take 1 tablet by mouth daily   Multiple Vitamins-Minerals (ZINC PO)  Self Yes No   Sig: Take by mouth   QUERCETIN PO  Self Yes No   Sig: Take by mouth daily   amphetamine-dextroamphetamine (ADDERALL, 20MG,) 20 mg tablet  Self Yes No   Sig: Take 20 mg by mouth 2 (two) times a day 2 tabs Am and 1 tab PM   finasteride (PROSCAR) 5 mg tablet  Self No No   Sig: Take 1 tablet (5 mg total) by mouth daily for 90 doses   Patient taking differently: Take 5 mg by mouth every evening    oxyCODONE-acetaminophen (PERCOCET) 5-325 mg per tablet  Self No No   Sig: Take 1 tablet by mouth every 4 (four) hours as needed for moderate painMax Daily Amount: 6 tablets   Patient not taking: Reported on 6/14/2021   phenazopyridine (PYRIDIUM) 100 mg tablet  Self No No   Sig: Take 1 tablet (100 mg total) by mouth 3 (three) times a day as needed for bladder spasms   tamsulosin (FLOMAX) 0 4 mg Self Yes No   Sig: Take 0 4 mg by mouth daily with dinner 2 tabs      Facility-Administered Medications: None       Past Medical History:   Diagnosis Date    ADD (attention deficit disorder)     Benign prostatic hyperplasia     Chronic kidney disease     Dental crowns present     Depression     Exercises 3 to 4 times per week     History of 2019 novel coronavirus disease (COVID-19) 01/2021    recovered at home/'pretty mild symptoms like the flu"    Indwelling Ordonez catheter present     Renal disorder     Retention of urine     Urinary retention     Wears glasses     reading       Past Surgical History:   Procedure Laterality Date    FRACTURE SURGERY Right     R  Hip Sx; screw implanted    [place] CYSTOURETHRO W/IMPLANT N/A 10/20/2021    Procedure: CYSTOSCOPY WITH INSERTION Sandeepbatool Booth;  Surgeon: Stephanie Licona MD;  Location: [facility];  Service: Urology    [place] REPAIR [address] HERNIA,5+Y/O,REDUCIBL Right 4/27/2021    Procedure: OPEN INGUINAL HERNIA REPAIR WITH MESH;  Surgeon: Charlene Cruz MD;  Location: [address] [place] [address] OR;  Service: General    [place] REPAIR UMBILICAL XVGX,5+E/D,FNIWB N/A 4/27/2021    Procedure: OPEN UMBILICAL HERNIA REPAIR WITH MESH;  Surgeon: Charlene Cruz MD;  Location: [address] [place] [address] OR;  Service: General    TONSILLECTOMY      WISDOM TOOTH EXTRACTION         History reviewed  No pertinent family history  I have reviewed and agree with the history as documented  E-Cigarette/Vaping    E-Cigarette Use Never User      E-Cigarette/Vaping Substances    Nicotine No     THC No     CBD No     Flavoring No     Other No     Unknown No      Social History     Tobacco Use    Smoking status: Former Smoker    Smokeless tobacco: Never Used   Vaping Use    Vaping Use: Never used   Substance Use Topics    Alcohol use: Never    Drug use: Never       Review of Systems   Constitutional: Negative for chills and fever  HENT: Negative for ear pain and sore throat      Eyes: Negative for pain and visual disturbance  Respiratory: Negative for cough and shortness of breath  Cardiovascular: Negative for chest pain and palpitations  Gastrointestinal: Negative for abdominal pain and vomiting  Genitourinary: Positive for difficulty urinating and penile pain  Negative for dysuria, hematuria and urgency  Musculoskeletal: Negative for arthralgias and back pain  Skin: Negative for color change and rash  Neurological: Negative for seizures and syncope  All other systems reviewed and are negative  Physical Exam  Physical Exam  Constitutional:       General: He is in acute distress  Appearance: Normal appearance  He is normal weight  He is not ill-appearing  HENT:      Head: Normocephalic and atraumatic  Right Ear: External ear normal       Left Ear: External ear normal       Nose: Nose normal       Mouth/Throat:      Mouth: Mucous membranes are moist    Eyes:      Conjunctiva/sclera: Conjunctivae normal    Cardiovascular:      Rate and Rhythm: Normal rate and regular rhythm  Pulses: Normal pulses  Heart sounds: Normal heart sounds  Pulmonary:      Effort: Pulmonary effort is normal       Breath sounds: Normal breath sounds  Abdominal:      General: Abdomen is flat  There is distension  Palpations: Abdomen is soft  There is no mass  Tenderness: There is abdominal tenderness  Comments: Suprapubic distention noted  Musculoskeletal:         General: No swelling, tenderness or deformity  Normal range of motion  Cervical back: Normal range of motion  Skin:     General: Skin is warm and dry  Capillary Refill: Capillary refill takes 2 to 3 seconds  Coloration: Skin is not pale  Neurological:      General: No focal deficit present  Mental Status: He is alert and oriented to person, place, and time  Mental status is at baseline     Psychiatric:         Mood and Affect: Mood normal          Vital Signs  ED Triage Vitals [02/20/22 1030]   Temperature Pulse Respirations Blood Pressure SpO2   (!) 97 4 °F (36 3 °C) 70 20 (!) 190/86 98 %      Temp Source Heart Rate Source Patient Position - Orthostatic VS BP Location FiO2 (%)   Temporal Monitor Sitting Left arm --      Pain Score       10 - Worst Possible Pain           Vitals:    02/20/22 1030   BP: (!) 190/86   Pulse: 70   Patient Position - Orthostatic VS: Sitting         Visual Acuity      ED Medications  Medications - No data to display    Diagnostic Studies  Results Reviewed     Procedure Component Value Units Date/Time    Urine Microscopic [[id number]]  (Abnormal) Collected: 02/20/22 1047    Lab Status: Final result Specimen: Urine, Indwelling Ordonez Catheter Updated: 02/20/22 1125     RBC, UA 2-4 /hpf      WBC, UA Innumerable /hpf      Epithelial Cells Occasional /hpf      Bacteria, UA Moderate /hpf     Urine culture [[id number]] Collected: 02/20/22 1047    Lab Status:  In process Specimen: Urine, Indwelling Ordonez Catheter Updated: 02/20/22 1125    UA w Reflex to Microscopic w Reflex to Culture [[id number]]  (Abnormal) Collected: 02/20/22 1047    Lab Status: Final result Specimen: Urine, Indwelling Ordonez Catheter Updated: 02/20/22 1057     Color, UA Yellow     Clarity, UA Turbid     Specific [place], UA 1 020     pH, UA 6 0     Leukocytes, UA 3+     Nitrite, UA Positive     Protein, UA Trace mg/dl      Glucose, UA Negative mg/dl      Ketones, UA Negative mg/dl      Urobilinogen, UA 0 2 E U /dl      Bilirubin, UA Negative     Blood, UA 3+                 No orders to display              Procedures  Procedures         ED Course                                             MDM    Disposition  Final diagnoses:   UTI (urinary tract infection)   Problem with Ordonez catheter, initial encounter [facility])     Time reflects when diagnosis was documented in both MDM as applicable and the Disposition within this note     Time User Action Codes Description Comment    2/20/2022 11:03 AM Pretty Castellanos Add [N39 0] UTI (urinary tract infection)     2/20/2022 11:03 AM Lillian Castellanos Add [T83  9XXA] Problem with Ordonez catheter, initial encounter [facility])       ED Disposition     ED Disposition Condition Date/Time Comment    Discharge Stable Sun Feb 20, 2022 11:07 AM Ashley Gopi discharge to home/self care              Follow-up Information     Follow up With Specialties Details Why Contact Elsi Rader MD Urology On 2/24/2022  [address]  [phone]            Discharge Medication List as of 2/20/2022 11:07 AM      START taking these medications    Details   amoxicillin-clavulanate (AUGMENTIN) 875-125 mg per tablet Take 1 tablet by mouth every 12 (twelve) hours for 7 days, Starting Sun 2/20/2022, Until Sun 2/27/2022, Normal         CONTINUE these medications which have NOT CHANGED    Details   amphetamine-dextroamphetamine (ADDERALL, 20MG,) 20 mg tablet Take 20 mg by mouth 2 (two) times a day 2 tabs Am and 1 tab PM, Historical Med      Cholecalciferol (VITAMIN D3 PO) Take by mouth, Historical Med      finasteride (PROSCAR) 5 mg tablet Take 1 tablet (5 mg total) by mouth daily for 90 doses, Starting Mon 6/14/2021, Until Tue 11/30/2021, Normal      GLUTATHIONE PO Take by mouth daily, Historical Med      HYDROcodone-acetaminophen (NORCO) 5-325 mg per tablet Take 1 tablet by mouth every 6 (six) hours as needed for pain for up to 5 dosesMax Daily Amount: 4 tablets, Starting Wed 10/20/2021, Normal      Multiple Vitamin (multivitamin) tablet Take 1 tablet by mouth daily, Historical Med      Multiple Vitamins-Minerals (ZINC PO) Take by mouth, Historical Med      oxyCODONE-acetaminophen (PERCOCET) 5-325 mg per tablet Take 1 tablet by mouth every 4 (four) hours as needed for moderate painMax Daily Amount: 6 tablets, Starting Tue 4/27/2021, Normal      phenazopyridine (PYRIDIUM) 100 mg tablet Take 1 tablet (100 mg total) by mouth 3 (three) times a day as needed for bladder spasms, Starting Wed 10/20/2021, Normal QUERCETIN PO Take by mouth daily, Historical Med      tamsulosin (FLOMAX) 0 4 mg Take 0 4 mg by mouth daily with dinner 2 tabs, Starting Mon 3/1/2021, Historical Med             No discharge procedures on file      PDMP Review       Value Time User    PDMP Reviewed  Yes 4/27/2021  9:18 PM Jese Raines MD          ED Provider  Electronically Signed by           Donato Rosas DO  02/20/22 8706

## 2022-02-20 NOTE — DISCHARGE INSTRUCTIONS
Drink plenty of fluids  Use Augmentin 1 pill twice a day for 7 days to treat infection  Take a probiotic or yogurt a day while on Augmentin  He need to have chronic urologic follow-up and care if you are having a chronic Ordonez catheter  Please call Dr Donalda Councilman, our on-call urologist for an appointment this week       Return to the ER with any new, concerning, worsening issues

## 2022-02-22 DIAGNOSIS — A49.8: Primary | ICD-10-CM

## 2022-02-22 DIAGNOSIS — B95.2 ENTEROCOCCUS UTI: ICD-10-CM

## 2022-02-22 DIAGNOSIS — N39.0 ENTEROCOCCUS UTI: ICD-10-CM

## 2022-02-22 LAB
BACTERIA UR CULT: ABNORMAL

## 2022-02-22 RX ORDER — SULFAMETHOXAZOLE AND TRIMETHOPRIM 800; 160 MG/1; MG/1
1 TABLET ORAL 2 TIMES DAILY
Qty: 14 TABLET | Refills: 0 | Status: SHIPPED | OUTPATIENT
Start: 2022-02-22 | End: 2022-03-01

## 2022-02-23 NOTE — RESULT ENCOUNTER NOTE
Informed patient of infection with 2 bacteria  Was rx'd augmentin which will tx the enterococcus, but serratia is resistant  Will write for bactrim   Advised to take both abx and f/u with PCP or urology

## 2022-03-21 ENCOUNTER — TELEPHONE (OUTPATIENT)
Dept: UROLOGY | Facility: MEDICAL CENTER | Age: 67
End: 2022-03-21

## 2022-03-21 NOTE — TELEPHONE ENCOUNTER
Patient of Saad Fraction at [facility] from [address] office called stating Patient is being referred back to see Urology  For Urodynamic to evaluated persistent Urinary retention after uro lift  Call patient directly to schedule

## 2022-03-21 NOTE — TELEPHONE ENCOUNTER
Contacted patient and scheduled him for Urodynamics in the [facility] office on 4/13/22 @ 8404  He was provided with office location

## 2022-04-13 ENCOUNTER — PROCEDURE VISIT (OUTPATIENT)
Dept: UROLOGY | Facility: MEDICAL CENTER | Age: 67
End: 2022-04-13
Payer: MEDICARE

## 2022-04-13 DIAGNOSIS — N31.8 LOW BLADDER COMPLIANCE: ICD-10-CM

## 2022-04-13 DIAGNOSIS — R33.9 URINARY RETENTION: Primary | ICD-10-CM

## 2022-04-13 PROCEDURE — 51784 ANAL/URINARY MUSCLE STUDY: CPT

## 2022-04-13 PROCEDURE — 51726 COMPLEX CYSTOMETROGRAM: CPT

## 2022-04-13 NOTE — PROGRESS NOTES
CC: " I wasn't emptying at all and I have a catheter"        CMG:       Position:  sitting          First urge:      145 ml,   pdet 2 cm of H2O         Normal urge:  183 ml,   pdet 4 cm of H2O        Must urge:      Not verbalized       Permission to void: 400 ml, pdet 39 cm of             H2O            Max pdet during void attempt  40 cm H2O       Voided volume:     0 ml    Bladder stability:   stable          Compliance:  Abnormal, rise in pdet during filling starting at 184 ml    EMG activity:       Normal during filling,        normal during void attempts    Void attempts       Volume in      pdet Voided volume   184           0   0      300         34  0   338         41  0   400          40  0    Comments: All void attempts were unsuccessful even with decreasing fill rate  Decreased compliance   At 338 ml patient c/o pain in tip of penis   At 400 ml patient requested test to be stopped due to pain  16 Fr giles catheter inserted post test and emptied 350 ml    Urodynamics    Date/Time: 4/13/2022 9:30 AM  Performed by: Kristina Porras RN  Authorized by: Nika Olivo MD   Universal Protocol:  Consent: Verbal consent obtained  Written consent obtained    Risks and benefits: risks, benefits and alternatives were discussed  Consent given by: patient  Patient understanding: patient states understanding of the procedure being performed  Patient consent: the patient's understanding of the procedure matches consent given  Procedure consent: procedure consent matches procedure scheduled  Patient identity confirmed: verbally with patient    Procedure - Urodynamics:  Procedure details: CMG and EMG    Voiding Pressure Study: No      Post-procedure:     Patient tolerance: Patient tolerated procedure well with no immediate complications

## 2022-04-25 NOTE — PROGRESS NOTES
UROLOGY FOLLOWUP NOTE     CHIEF COMPLAINT   Osvaldo Bright is a 74 y o  male with a complaint of   Chief Complaint   Patient presents with    Cystoscopy    Urinary Retention       History of Present Illness:     74 y o  male with reported longstanding lower urinary tract symptoms for at least 3 years prior to presentation  Patient denies family history of prostate cancer  He developed urinary retention requiring catheter placement  This was prior to a hernia repair at an outside institution  He saw urologist and underwent cystoscopy  He was referred for surgical management of his prostate, possible UroLift  Patient is still catheter dependent with a 12 [place]  He is on Flomax 2 pills at night  After our initial visit, the patient agreed to return for cystoscopy with transrectal ultrasound measurement of his prostate with uroflow  At that time, we discussed that he had abnormal appearing bladder with trabeculation, small compliance only tolerating a proximally 300 cc of fluid and a small nonocclusive prostate  We did not feel that prostate occlusion was the sole reason for retention  This was well discussed with the patient at his August visit 2 months before surgery  He agreed to add Proscar  After outlet workup, underwent UroLift implant 10/20/21  He was extensively counseled about the higher than average risk of failure given the appearance of his prostate and bladder  Unable to resolve retention  Patient reports that he had seen his prior urologist, Dr Radha Rose has undergone attempted void trial and cystoscopies without benefit  Now s/p Urodynamics       Lab Results   Component Value Date    PSA 0 3 03/08/2021    PSA 0 3 03/05/2021    PSA 0 3 08/10/2020     Past Medical History:     Past Medical History:   Diagnosis Date    ADD (attention deficit disorder)     Benign prostatic hyperplasia     Chronic kidney disease     Dental crowns present     Depression     Exercises 3 to 4 times per week     History of 2019 novel coronavirus disease (COVID-19) 01/2021    recovered at home/'pretty mild symptoms like the flu"    Indwelling Ordonez catheter present     Renal disorder     Retention of urine     Urinary retention     Wears glasses     reading       PAST SURGICAL HISTORY:     Past Surgical History:   Procedure Laterality Date    FRACTURE SURGERY Right     R   Hip Sx; screw implanted    SD CYSTOURETHRO W/IMPLANT N/A 10/20/2021    Procedure: CYSTOSCOPY WITH INSERTION UROLIFT;  Surgeon: Talya Casey MD;  Location: [facility];  Service: Urology    SD REPAIR ING HERNIA,5+Y/O,REDUCIBL Right 4/27/2021    Procedure: OPEN INGUINAL HERNIA REPAIR WITH MESH;  Surgeon: Mariano Garcia MD;  Location: [facility] MAIN OR;  Service: General    SD REPAIR UMBILICAL PYUC,6+F/K,CJERM N/A 4/27/2021    Procedure: OPEN UMBILICAL HERNIA REPAIR WITH MESH;  Surgeon: Mariano Garcia MD;  Location: [facility] MAIN OR;  Service: General    TONSILLECTOMY      WISDOM TOOTH EXTRACTION         CURRENT MEDICATIONS:     Current Outpatient Medications   Medication Sig Dispense Refill    amphetamine-dextroamphetamine (ADDERALL, 20MG,) 20 mg tablet Take 20 mg by mouth 2 (two) times a day 2 tabs Am and 1 tab PM      Cholecalciferol (VITAMIN D3 PO) Take by mouth      GLUTATHIONE PO Take by mouth daily      Multiple Vitamin (multivitamin) tablet Take 1 tablet by mouth daily      Multiple Vitamins-Minerals (ZINC PO) Take by mouth      QUERCETIN PO Take by mouth daily      tamsulosin (FLOMAX) 0 4 mg Take 0 4 mg by mouth daily with dinner 2 tabs      finasteride (PROSCAR) 5 mg tablet Take 1 tablet (5 mg total) by mouth daily for 90 doses (Patient taking differently: Take 5 mg by mouth every evening ) 90 tablet 3    HYDROcodone-acetaminophen (NORCO) 5-325 mg per tablet Take 1 tablet by mouth every 6 (six) hours as needed for pain for up to 5 dosesMax Daily Amount: 4 tablets 5 tablet 0    oxyCODONE-acetaminophen (PERCOCET) 5-325 mg per tablet Take 1 tablet by mouth every 4 (four) hours as needed for moderate painMax Daily Amount: 6 tablets (Patient not taking: Reported on 6/14/2021) 20 tablet 0    phenazopyridine (PYRIDIUM) 100 mg tablet Take 1 tablet (100 mg total) by mouth 3 (three) times a day as needed for bladder spasms 20 tablet 0     No current facility-administered medications for this visit  Facility-Administered Medications Ordered in Other Visits   Medication Dose Route Frequency Provider Last Rate Last Admin    oxyCODONE-acetaminophen (PERCOCET) 5-325 mg per tablet 1 tablet  1 tablet Oral Once Stiven Keya, DO           ALLERGIES:   No Known Allergies    SOCIAL HISTORY:     Social History     Socioeconomic History    Marital status: Single     Spouse name: None    Number of children: None    Years of education: None    Highest education level: None   Occupational History    None   Tobacco Use    Smoking status: Former Smoker    Smokeless tobacco: Never Used   Vaping Use    Vaping Use: Never used   Substance and Sexual Activity    Alcohol use: Never    Drug use: Never    Sexual activity: None     Comment: defer   Other Topics Concern    None   Social History Narrative    None     Social Determinants of Health     Financial Resource Strain: Not on file   Food Insecurity: Not on file   Transportation Needs: Not on file   Physical Activity: Not on file   Stress: Not on file   Social Connections: Not on file   Intimate Partner Violence: Not on file   Housing Stability: Not on file       SOCIAL HISTORY:   History reviewed  No pertinent family history  REVIEW OF SYSTEMS:     Review of Systems   Constitutional: Negative  Respiratory: Negative  Cardiovascular: Negative  Gastrointestinal: Negative  Genitourinary: Positive for difficulty urinating (  Ordonez dependent) and penile pain  Musculoskeletal: Negative  Skin: Negative      Psychiatric/Behavioral: Positive for dysphoric mood (Frustrated)  PHYSICAL EXAM:     /82 (BP Location: Left arm, Patient Position: Sitting, Cuff Size: Adult)   Pulse 60   Ht 5' 8" (1 727 m)   Wt 79 8 kg (176 lb)   BMI 26 76 kg/m²     General:  Healthy appearing male in no acute distress  They have a normal affect  There is not appear to be any gross neurologic defects or abnormalities  HEENT:  Normocephalic, atraumatic  Neck is supple without any palpable lymphadenopathy  Cardiovascular:  Patient has normal palpable distal radial pulses  There is no significant peripheral edema  No JVD is noted  Respiratory:  Patient has unlabored respirations  There is no audible wheeze or rhonchi  Abdomen: Abdomen is soft and nontender  There is no tympany  Inguinal and umbilical hernia are not appreciated  Genitourinary:  12 [place] Ordonez, clear urine    Musculoskeletal:  Patient does not have significant CVA tenderness in the  flank with palpation or percussion  They full range of motion in all 4 extremities  Strength in all 4 extremities appears congruent  Patient is able to ambulate without assistance or difficulty  Dermatologic:  Patient has no skin abnormalities or rashes  LABS:     CBC:   Lab Results   Component Value Date    WBC 8 20 04/27/2021    HGB 16 2 04/27/2021    HCT 49 2 (H) 04/27/2021    MCV 88 04/27/2021     04/27/2021       BMP:   Lab Results   Component Value Date    CALCIUM 9 2 07/21/2021    K 4 1 07/21/2021    CO2 25 07/21/2021     07/21/2021    BUN 35 (H) 07/21/2021    CREATININE 1 36 (H) 07/21/2021         IMAGING:     3/11/21  RENAL ULTRASOUND     INDICATION:   N13 30: Unspecified hydronephrosis      COMPARISON: Renal ultrasound dated February 26, 2021      TECHNIQUE:   Ultrasound of the retroperitoneum was performed with a curvilinear transducer utilizing volumetric sweeps and still imaging techniques       FINDINGS:     KIDNEYS:  Symmetric and normal size  Right kidney:  11 9 x 7 3 x 5 8 cm    Left kidney:  12 4 x 7 1 x 5 3 cm      Right kidney  Normal echogenicity and contour  No suspicious masses detected  Mild hydronephrosis, markedly improved from prior exam   5 mm nonshadowing echogenic focus in the lower pole, possibly representing a small nonobstructing calculus  No perinephric fluid collections      Left kidney  Normal echogenicity and contour  No suspicious masses detected  Mild hydronephrosis, markedly improved from prior exam   No shadowing calculi  No perinephric fluid collections      URETERS:  Nonvisualized      BLADDER:   Decompressed with Ordonez catheter and not adequately evaluated  There appears to be a nodular area surrounding the Ordonez catheter , possibly representing an enlarged prostate gland; however, possibility of a bladder lesion is not excluded      IMPRESSION:        1  Mild bilateral hydronephrosis, markedly improved from February 26, 2021      2   Decompressed urinary bladder with Ordonez catheter in place, limiting optimal evaluation  There appears to be a nodular area surrounding the Ordonez catheter, possibly representing an enlarged prostate gland; however, possibility of a bladder lesiois   not excluded      URODYNAMICS:     CMG:       Position:  sitting          First urge:      145 ml,   pdet 2 cm of H2O         Normal urge:  183 ml,   pdet 4 cm of H2O        Must urge:      Not verbalized       Permission to void: 400 ml, pdet 39 cm of             H2O            Max pdet during void attempt  40 cm H2O       Voided volume:     0 ml     Bladder stability:   stable          Compliance:  Abnormal, rise in pdet during filling starting at 184 ml    EMG activity:       Normal during filling,        normal during void attempts     Void attempts       Volume in        pdet            Voided volume              184              0                          0              300              34                        0              338              41                        0 400              40                        0     Comments: All void attempts were unsuccessful even with decreasing fill rate  Decreased compliance              At 338 ml patient c/o pain in tip of penis              At 400 ml patient requested test to be stopped due to pain  16 Fr giles catheter inserted post test and emptied 350 ml      PROCEDURE:     Patient refuses cystoscopy    ASSESSMENT:     74 y o  male with ongoing [facility]    PLAN:     Patient was scheduled for cystoscopy today to re-evaluate his UroLift and outlet 6 months after his surgery given ongoing retention  He has deferred today  He understands we would be looking for any concern for persistent obstruction or exposed endplates  We discussed the results of his urodynamics which show poor compliance with low tolerance of filling at volumes less than 400 cc  Patient's bladder pressures do begin to rise but he is unable to generate urinary flow  As we previously thought, I feel that there is likely to be some underlying neuromuscular component  Patient remains extremely frustrated with the ongoing retention  As he is unwilling to undergo cystoscopy today, we again discussed options  I offered a referral to 1 of my patients who could potentially discuss neuromodulation  There are some idiopathic retention patients who can benefit from this  We discussed InterStim and posterior tibial nerve stimulation  Patient has deferred a referral to another urologist   We also discussed referral to a higher level of care with a fellowship trained neuro urologist   This would require travel to [address]  The patient again defers  At this point time, he prefers to remain catheter dependent  He would like intermittent void trials  We will continue to perform these at the patient's catheter exchanges every 4-6 weeks    He is interested in exploring the idea of clean intermittent catheterization so this can be performed at his next nursing visit  Patient understands that with chronic catheterization, he will need intermittent upper tract surveillance with an ultrasound and BMPs  I have ordered these upcoming  He understands he may need intermittent cystoscopies given the risk of development of squamous cell bladder cancers in patients with chronic catheters  We also discussed ongoing prostate cancer surveillance  Patient will be due for upcoming PSA  Will recommend 6 month visit with a provider to reassess  Visit, review of records and urodynamics and complex decision making performed   45 minutes spent

## 2022-04-26 ENCOUNTER — PROCEDURE VISIT (OUTPATIENT)
Dept: UROLOGY | Facility: CLINIC | Age: 67
End: 2022-04-26
Payer: MEDICARE

## 2022-04-26 VITALS
WEIGHT: 176 LBS | BODY MASS INDEX: 26.67 KG/M2 | HEIGHT: 68 IN | HEART RATE: 60 BPM | DIASTOLIC BLOOD PRESSURE: 82 MMHG | SYSTOLIC BLOOD PRESSURE: 138 MMHG

## 2022-04-26 DIAGNOSIS — R33.9 URINARY RETENTION: Primary | ICD-10-CM

## 2022-04-26 DIAGNOSIS — Z12.5 SCREENING FOR PROSTATE CANCER: ICD-10-CM

## 2022-04-26 PROCEDURE — 99215 OFFICE O/P EST HI 40 MIN: CPT | Performed by: UROLOGY

## 2022-04-26 NOTE — Clinical Note
Patient will need a void trial with catheter removal in the morning in 2 weeks time  If he does not void, he would like to assess clean intermittent catheterization as an option    Can use 14 [place] coude tip 4 times a day

## 2022-05-10 ENCOUNTER — HOSPITAL ENCOUNTER (OUTPATIENT)
Dept: ULTRASOUND IMAGING | Facility: HOSPITAL | Age: 67
Discharge: HOME | End: 2022-05-10
Attending: UROLOGY
Payer: MEDICARE

## 2022-05-10 ENCOUNTER — APPOINTMENT (OUTPATIENT)
Dept: LAB | Facility: HOSPITAL | Age: 67
End: 2022-05-10
Attending: UROLOGY
Payer: MEDICARE

## 2022-05-10 DIAGNOSIS — R33.9 URINARY RETENTION: ICD-10-CM

## 2022-05-10 DIAGNOSIS — Z12.5 SCREENING FOR PROSTATE CANCER: ICD-10-CM

## 2022-05-10 LAB
ALBUMIN SERPL BCP-MCNC: 4.5 G/DL (ref 3.5–5)
ALP SERPL-CCNC: 39 U/L (ref 34–104)
ALT SERPL W P-5'-P-CCNC: 18 U/L (ref 7–52)
ANION GAP SERPL CALCULATED.3IONS-SCNC: 7 MMOL/L (ref 4–13)
AST SERPL W P-5'-P-CCNC: 20 U/L (ref 13–39)
BASOPHILS # BLD AUTO: 0.06 THOUSANDS/ΜL (ref 0–0.1)
BASOPHILS NFR BLD AUTO: 1 % (ref 0–1)
BILIRUB SERPL-MCNC: 0.68 MG/DL (ref 0.2–1)
BUN SERPL-MCNC: 34 MG/DL (ref 5–25)
CALCIUM SERPL-MCNC: 9.9 MG/DL (ref 8.4–10.2)
CHLORIDE SERPL-SCNC: 99 MMOL/L (ref 96–108)
CO2 SERPL-SCNC: 31 MMOL/L (ref 21–32)
CREAT SERPL-MCNC: 1.99 MG/DL (ref 0.6–1.3)
EOSINOPHIL # BLD AUTO: 0.07 THOUSAND/ΜL (ref 0–0.61)
EOSINOPHIL NFR BLD AUTO: 1 % (ref 0–6)
ERYTHROCYTE [DISTWIDTH] IN BLOOD BY AUTOMATED COUNT: 13.5 % (ref 11.6–15.1)
GFR SERPL CREATININE-BSD FRML MDRD: 33 ML/MIN/1.73SQ M
GLUCOSE P FAST SERPL-MCNC: 108 MG/DL (ref 65–99)
HCT VFR BLD AUTO: 52.8 % (ref 36.5–49.3)
HGB BLD-MCNC: 16.9 G/DL (ref 12–17)
IMM GRANULOCYTES # BLD AUTO: 0.01 THOUSAND/UL (ref 0–0.2)
IMM GRANULOCYTES NFR BLD AUTO: 0 % (ref 0–2)
LYMPHOCYTES # BLD AUTO: 1.52 THOUSANDS/ΜL (ref 0.6–4.47)
LYMPHOCYTES NFR BLD AUTO: 21 % (ref 14–44)
MCH RBC QN AUTO: 29.1 PG (ref 26.8–34.3)
MCHC RBC AUTO-ENTMCNC: 32 G/DL (ref 31.4–37.4)
MCV RBC AUTO: 91 FL (ref 82–98)
MONOCYTES # BLD AUTO: 0.51 THOUSAND/ΜL (ref 0.17–1.22)
MONOCYTES NFR BLD AUTO: 7 % (ref 4–12)
NEUTROPHILS # BLD AUTO: 5.13 THOUSANDS/ΜL (ref 1.85–7.62)
NEUTS SEG NFR BLD AUTO: 70 % (ref 43–75)
NRBC BLD AUTO-RTO: 0 /100 WBCS
PLATELET # BLD AUTO: 226 THOUSANDS/UL (ref 149–390)
PMV BLD AUTO: 10.5 FL (ref 8.9–12.7)
POTASSIUM SERPL-SCNC: 4.5 MMOL/L (ref 3.5–5.3)
PROT SERPL-MCNC: 7 G/DL (ref 6.4–8.4)
PSA SERPL-MCNC: 0.2 NG/ML (ref 0–4)
RBC # BLD AUTO: 5.8 MILLION/UL (ref 3.88–5.62)
SODIUM SERPL-SCNC: 137 MMOL/L (ref 135–147)
URATE SERPL-MCNC: 6.4 MG/DL (ref 3.5–8.5)
WBC # BLD AUTO: 7.3 THOUSAND/UL (ref 4.31–10.16)

## 2022-05-10 PROCEDURE — 76770 US EXAM ABDO BACK WALL COMP: CPT

## 2022-05-10 PROCEDURE — 80053 COMPREHEN METABOLIC PANEL: CPT

## 2022-05-10 PROCEDURE — 36415 COLL VENOUS BLD VENIPUNCTURE: CPT

## 2022-05-10 PROCEDURE — 85025 COMPLETE CBC W/AUTO DIFF WBC: CPT

## 2022-05-10 PROCEDURE — 84550 ASSAY OF BLOOD/URIC ACID: CPT

## 2022-05-10 PROCEDURE — 84153 ASSAY OF PSA TOTAL: CPT

## 2022-05-12 ENCOUNTER — PROCEDURE VISIT (OUTPATIENT)
Dept: UROLOGY | Facility: CLINIC | Age: 67
End: 2022-05-12
Payer: MEDICARE

## 2022-05-12 VITALS
HEART RATE: 78 BPM | BODY MASS INDEX: 26.98 KG/M2 | HEIGHT: 68 IN | WEIGHT: 178 LBS | SYSTOLIC BLOOD PRESSURE: 132 MMHG | DIASTOLIC BLOOD PRESSURE: 82 MMHG

## 2022-05-12 DIAGNOSIS — R33.9 URINARY RETENTION: Primary | ICD-10-CM

## 2022-05-12 LAB — POST-VOID RESIDUAL VOLUME, ML POC: 294.33 ML

## 2022-05-12 PROCEDURE — 51702 INSERT TEMP BLADDER CATH: CPT

## 2022-05-12 PROCEDURE — 51798 US URINE CAPACITY MEASURE: CPT

## 2022-05-12 NOTE — PROGRESS NOTES
Universal Protocol:  Consent: Verbal consent obtained  Risks and benefits: risks, benefits and alternatives were discussed  Consent given by: patient      Bladder catheterization    Date/Time: 5/12/2022 3:24 PM  Performed by: Mayte Villarreal  Authorized by: Nisreen Banuelos MD     Patient location:  Bedside  Consent:     Consent given by:  Patient  Procedure details:     Bladder irrigation: yes      Catheter insertion:  Indwelling    Approach: natural orifice      Catheter type: Ordonez    Catheter size:  16 Fr    Number of attempts:  1    Successful placement: yes      Urine characteristics:  Clear and yellow  Post-procedure details:     Patient tolerance of procedure:   Tolerated well, no immediate complications

## 2022-05-12 NOTE — PROGRESS NOTES
5/12/2022    Mauryhunter Perez  1955  [id number]    Diagnosis  Chief Complaint     Urinary Retention          Patient presents for void trial managed by Dr Fly Moscoso has had multiple discussions with patient regarding his plan of care  Patient has refused office cystoscopy in the past  Is refusing to learn CIC  Patient requesting to have giles catheter place and to "try again in a few weeks " After discussion with Dr Blaise Moscoso, will place catheter per patient request and plan per patient request      Procedure Giles removal/voiding trial    Patient presents with clear yellow urine in giles catheter leg bag  16 Fr Giles catheter removed after deflation of an intact balloon  Patient tolerated well  Encouraged patient to hydrate well and return this afternoon for post void residual   he knows he may return early if uncomfortable and unable to urinate  Patient agrees to this plan  Patient returned this afternoon  Patient states unable to void  Patient voided very minimal amount while in office  Bladder ultrasound performed and PVR measured 294 ml              Vitals:    05/12/22 0850   BP: 132/82   Pulse: 78   Weight: 80 7 kg (178 lb)   Height: 5' 8" (1 727 m)       Recent Results (from the past 1 hour(s))   POCT Measure PVR    Collection Time: 05/12/22  2:01 PM   Result Value Ref Range    POST-VOID RESIDUAL VOLUME, ML  33 mL         Chaz Lopez RN

## 2022-06-14 ENCOUNTER — TELEPHONE (OUTPATIENT)
Dept: UROLOGY | Facility: CLINIC | Age: 67
End: 2022-06-14

## 2022-06-14 NOTE — TELEPHONE ENCOUNTER
Patient scheduled for void trial tomorrow  Called patient and left message asking to reschedule his appointment to Thursday the 16th, 9am for giles removal and return at 200 pm   Please confirm when patient calls

## 2022-06-15 ENCOUNTER — PROCEDURE VISIT (OUTPATIENT)
Dept: UROLOGY | Facility: CLINIC | Age: 67
End: 2022-06-15
Payer: MEDICARE

## 2022-06-15 ENCOUNTER — HOSPITAL ENCOUNTER (EMERGENCY)
Facility: HOSPITAL | Age: 67
Discharge: HOME | End: 2022-06-15
Attending: EMERGENCY MEDICINE
Payer: MEDICARE

## 2022-06-15 VITALS
HEART RATE: 120 BPM | SYSTOLIC BLOOD PRESSURE: 160 MMHG | RESPIRATION RATE: 20 BRPM | DIASTOLIC BLOOD PRESSURE: 90 MMHG | BODY MASS INDEX: 24.71 KG/M2 | HEIGHT: 68 IN | WEIGHT: 163 LBS

## 2022-06-15 VITALS
OXYGEN SATURATION: 98 % | TEMPERATURE: 97.1 F | RESPIRATION RATE: 19 BRPM | HEART RATE: 101 BPM | SYSTOLIC BLOOD PRESSURE: 132 MMHG | DIASTOLIC BLOOD PRESSURE: 72 MMHG

## 2022-06-15 DIAGNOSIS — R33.9 URINARY RETENTION: Primary | ICD-10-CM

## 2022-06-15 DIAGNOSIS — N39.0 UTI (URINARY TRACT INFECTION): ICD-10-CM

## 2022-06-15 LAB
BACTERIA UR QL AUTO: ABNORMAL /HPF
BILIRUB UR QL STRIP: NEGATIVE
CLARITY UR: ABNORMAL
COLOR UR: YELLOW
GLUCOSE UR STRIP-MCNC: NEGATIVE MG/DL
HGB UR QL STRIP.AUTO: NEGATIVE
KETONES UR STRIP-MCNC: NEGATIVE MG/DL
LEUKOCYTE ESTERASE UR QL STRIP: ABNORMAL
NITRITE UR QL STRIP: POSITIVE
NON-SQ EPI CELLS URNS QL MICRO: ABNORMAL /HPF
PH UR STRIP.AUTO: 7 [PH]
PROT UR STRIP-MCNC: NEGATIVE MG/DL
RBC #/AREA URNS AUTO: ABNORMAL /HPF
SP GR UR STRIP.AUTO: 1.01 (ref 1–1.03)
UROBILINOGEN UR QL STRIP.AUTO: 1 E.U./DL
WBC #/AREA URNS AUTO: ABNORMAL /HPF

## 2022-06-15 PROCEDURE — 87086 URINE CULTURE/COLONY COUNT: CPT

## 2022-06-15 PROCEDURE — 81001 URINALYSIS AUTO W/SCOPE: CPT

## 2022-06-15 PROCEDURE — 87186 SC STD MICRODIL/AGAR DIL: CPT

## 2022-06-15 PROCEDURE — 51798 US URINE CAPACITY MEASURE: CPT

## 2022-06-15 PROCEDURE — 99283 EMERGENCY DEPT VISIT LOW MDM: CPT

## 2022-06-15 PROCEDURE — 87077 CULTURE AEROBIC IDENTIFY: CPT

## 2022-06-15 PROCEDURE — 99284 EMERGENCY DEPT VISIT MOD MDM: CPT | Performed by: EMERGENCY MEDICINE

## 2022-06-15 RX ORDER — CEPHALEXIN 500 MG/1
500 CAPSULE ORAL ONCE
Status: COMPLETED | OUTPATIENT
Start: 2022-06-15 | End: 2022-06-15

## 2022-06-15 RX ORDER — CEPHALEXIN 500 MG/1
500 CAPSULE ORAL EVERY 6 HOURS SCHEDULED
Qty: 28 CAPSULE | Refills: 0 | Status: SHIPPED | OUTPATIENT
Start: 2022-06-15 | End: 2022-06-22

## 2022-06-15 RX ADMIN — CEPHALEXIN 500 MG: 500 CAPSULE ORAL at 18:25

## 2022-06-15 NOTE — PROGRESS NOTES
6/15/2022    Pamelia Saliva  1955  [id number]    Diagnosis  Chief Complaint     Urinary Retention          Patient presents for void trial managed by Dr Janay Maier  Patient to go home without giles catheter  Patient to return in the morning to learn CIC  Procedure Giles removal/voiding trial    Giles catheter removed after deflation of an intact balloon  Patient tolerated well  Encouraged patient to hydrate well and return this afternoon for post void residual   he knows he may return early if uncomfortable and unable to urinate  Patient agrees to this plan  Patient returned this afternoon  Patient states able to void  Patient voided 0 ml while in office  Bladder ultrasound performed and PVR measured 366 ml  Discussed [facility] with [facility]  It was decided for patient to return home without catheter  Patient requested  Patient willing to learn CIC and will return in the morning for the teaching        Vitals:    06/15/22 0839   BP: 160/90   Pulse: (!) 120   Resp: 20   Weight: 73 9 kg (163 lb)   Height: 5' 8" (1 727 m)           Leopold Donate, RN

## 2022-06-15 NOTE — ED PROVIDER NOTES
History  Chief Complaint   Patient presents with    Urinary Retention     Pt reports he had a urinary catheter removed this morning and has not urinated since 1400hrs      This is a 68-year-old man who complains of urinary retention  He states he has had a Ordonez catheter in place for approximately the past year  This taken out this morning  He has been unable to void since it was removed  He notes he is having some feeling of dysuria suprapubic pressure  No fevers, chills, nausea or vomiting  He thinks that this is primarily related to prostate although he states that he had procedure done he continues to have symptoms despite having the procedure for his prostate completed  Prior to Admission Medications   Prescriptions Last Dose Informant Patient Reported? Taking? Cholecalciferol (VITAMIN D3 PO)  Self Yes No   Sig: Take by mouth   Patient not taking: Reported on 6/15/2022   GLUTATHIONE PO  Self Yes No   Sig: Take by mouth daily   Patient not taking: Reported on 6/15/2022   HYDROcodone-acetaminophen (NORCO) 5-325 mg per tablet  Self No No   Sig: Take 1 tablet by mouth every 6 (six) hours as needed for pain for up to 5 dosesMax Daily Amount: 4 tablets   Multiple Vitamin (multivitamin) tablet  Self Yes No   Sig: Take 1 tablet by mouth daily   Patient not taking: Reported on 6/15/2022   Multiple Vitamins-Minerals (ZINC PO)   Yes No   Sig: Take by mouth   Patient not taking: No sig reported   QUERCETIN PO  Self Yes No   Sig: Take by mouth daily   Patient not taking: Reported on 6/15/2022   amphetamine-dextroamphetamine (ADDERALL) 20 mg tablet  Self Yes No   Sig: Take 20 mg by mouth in the morning and 20 mg at noon   2 tabs Am and 1 tab PM     finasteride (PROSCAR) 5 mg tablet  Self No No   Sig: Take 1 tablet (5 mg total) by mouth daily for 90 doses   Patient taking differently: Take 5 mg by mouth every evening    oxyCODONE-acetaminophen (PERCOCET) 5-325 mg per tablet  Self No No   Sig: Take 1 tablet by mouth every 4 (four) hours as needed for moderate painMax Daily Amount: 6 tablets   Patient not taking: Reported on 6/14/2021   phenazopyridine (PYRIDIUM) 100 mg tablet  Self No No   Sig: Take 1 tablet (100 mg total) by mouth 3 (three) times a day as needed for bladder spasms   tamsulosin (FLOMAX) 0 4 mg  Self Yes No   Sig: Take 0 4 mg by mouth daily with dinner 2 tabs      Facility-Administered Medications: None       Past Medical History:   Diagnosis Date    ADD (attention deficit disorder)     Benign prostatic hyperplasia     Chronic kidney disease     Dental crowns present     Depression     Exercises 3 to 4 times per week     History of 2019 novel coronavirus disease (COVID-19) 01/2021    recovered at home/'pretty mild symptoms like the flu"    Indwelling Ordonez catheter present     Renal disorder     Retention of urine     Urinary retention     Wears glasses     reading       Past Surgical History:   Procedure Laterality Date    FRACTURE SURGERY Right     R  Hip Sx; screw implanted    [place] CYSTOURETHRO W/IMPLANT N/A 10/20/2021    Procedure: CYSTOSCOPY WITH INSERTION Jose C Gorman;  Surgeon: Jessica Elizabeth MD;  Location: [facility] OR;  Service: Urology    [place] REPAIR [address] HERNIA,5+Y/O,REDUCIBL Right 4/27/2021    Procedure: OPEN INGUINAL HERNIA REPAIR WITH MESH;  Surgeon: Catina Brown MD;  Location: [address] [place] [address] OR;  Service: General    [place] REPAIR UMBILICAL [place],1+E/W,UEMSD N/A 4/27/2021    Procedure: OPEN UMBILICAL HERNIA REPAIR WITH MESH;  Surgeon: Catina Brown MD;  Location: [address] [place] [address] OR;  Service: General    TONSILLECTOMY      WISDOM TOOTH EXTRACTION         History reviewed  No pertinent family history  I have reviewed and agree with the history as documented      E-Cigarette/Vaping    E-Cigarette Use Never User      E-Cigarette/Vaping Substances    Nicotine No     THC No     CBD No     Flavoring No     Other No     Unknown No      Social History     Tobacco Use    Smoking status: Former Smoker    Smokeless tobacco: Never Used   Vaping Use    Vaping Use: Never used   Substance Use Topics    Alcohol use: Never    Drug use: Never       Review of Systems   Constitutional: Negative for chills and fever  Eyes: Negative for visual disturbance  Respiratory: Negative for shortness of breath  Cardiovascular: Negative for chest pain  Gastrointestinal: Negative for abdominal distention and abdominal pain  Endocrine: Negative for polyuria  Genitourinary: Negative for decreased urine volume and dysuria  Neurological: Negative for dizziness, syncope and weakness  Physical Exam  Physical Exam  Constitutional:       General: He is not in acute distress  Appearance: He is well-developed  HENT:      Head: Normocephalic and atraumatic  Eyes:      Conjunctiva/sclera: Conjunctivae normal       Pupils: Pupils are equal, round, and reactive to light  Cardiovascular:      Rate and Rhythm: Normal rate and regular rhythm  Heart sounds: Normal heart sounds  Pulmonary:      Effort: Pulmonary effort is normal  No respiratory distress  Breath sounds: Normal breath sounds  Abdominal:      General: Bowel sounds are normal       Palpations: Abdomen is soft  Musculoskeletal:         General: Normal range of motion  Cervical back: Normal range of motion and neck supple  Skin:     General: Skin is warm and dry  Neurological:      Mental Status: He is alert and oriented to person, place, and time  Psychiatric:         Behavior: Behavior normal          Thought Content:  Thought content normal          Judgment: Judgment normal          Vital Signs  ED Triage Vitals [06/15/22 1738]   Temperature Pulse Respirations Blood Pressure SpO2   (!) 97 1 °F (36 2 °C) 101 19 132/72 98 %      Temp Source Heart Rate Source Patient Position - Orthostatic VS BP Location FiO2 (%)   Temporal Monitor -- Right arm --      Pain Score       --           Vitals:    06/15/22 1738   BP: 132/72 Pulse: 101         Visual Acuity      ED Medications  Medications   cephalexin (KEFLEX) capsule 500 mg (500 mg Oral Given 6/15/22 1825)       Diagnostic Studies  Results Reviewed     Procedure Component Value Units Date/Time    UA w Reflex to Microscopic w Reflex to Culture [[id number]]  (Abnormal) Collected: 06/15/22 1809    Lab Status: Final result Specimen: Urine, Suprapubic catheter Updated: 06/15/22 1816     Color, UA Yellow     Clarity, UA Cloudy     Specific [place], UA 1 010     pH, UA 7 0     Leukocytes, UA 2+     Nitrite, UA Positive     Protein, UA Negative mg/dl      Glucose, UA Negative mg/dl      Ketones, UA Negative mg/dl      Urobilinogen, UA 1 0 E U /dl      Bilirubin, UA Negative     Blood, UA Negative    Urine Microscopic [[id number]] Collected: 06/15/22 1809    Lab Status: In process Specimen: Urine, Suprapubic catheter Updated: 06/15/22 1816                 No orders to display              Procedures  Procedures         ED Course                                             MDM  Number of Diagnoses or Management Options  Urinary retention: new and requires workup  UTI (urinary tract infection): new and requires workup  Diagnosis management comments: This is a 26-year-old man with urinary retention in the setting what seems to be urinary tract infection  Patient started on Keflex  He appears clinically well  Ordonez catheter inserted  He has follow-up with Urology tomorrow morning in the outpatient setting  Discussed warning signs and symptoms with the patient as well as when to return to the emergency department versus follow up with [facility]  Patient states understanding and agreement with the plan  Patient care delayed due to critical capacity in the emergency department  This note was completed using dictation software            Amount and/or Complexity of Data Reviewed  Clinical lab tests: ordered and reviewed        Disposition  Final diagnoses:   Urinary retention   UTI (urinary tract infection)     Time reflects when diagnosis was documented in both MDM as applicable and the Disposition within this note     Time User Action Codes Description Comment    6/15/2022  6:14 PM Francisco Barcenas [R33 9] Urinary retention     6/15/2022  6:19 PM Blanquita Mclean Sushma [N39 0] UTI (urinary tract infection)       ED Disposition     ED Disposition   Discharge    Condition   Stable    Date/Time   Wed Andi 15, 2022  6:14 PM    Comment   Vijay Burton discharge to home/self care  Follow-up Information     Follow up With Specialties Details Why Contact Info    Sánchez Rowan MD Internal Medicine In 1 day  [address] [place] [address] [phone]            Patient's Medications   Discharge Prescriptions    CEPHALEXIN (KEFLEX) 500 MG CAPSULE    Take 1 capsule (500 mg total) by mouth every 6 (six) hours for 7 days       Start Date: 6/15/2022 End Date: 6/22/2022       Order Dose: 500 mg       Quantity: 28 capsule    Refills: 0       No discharge procedures on file      PDMP Review       Value Time User    PDMP Reviewed  Yes 4/27/2021  9:18 PM Opal Anne MD          ED Provider  Electronically Signed by           Isaias Galdamez MD  06/15/22 6279

## 2022-06-16 LAB — POST-VOID RESIDUAL VOLUME, ML POC: 366 ML

## 2022-06-16 NOTE — PROGRESS NOTES
Patient returned to the office this morning  Patient seen in the ED last evening and giles placed for retention  Patient wishes to keep giles for another month and attempt another void trail at that time  Void trial scheduled 7/20/2022

## 2022-06-17 LAB
BACTERIA UR CULT: ABNORMAL

## 2022-06-19 DIAGNOSIS — N39.0 UTI (URINARY TRACT INFECTION): Primary | ICD-10-CM

## 2022-06-19 RX ORDER — LEVOFLOXACIN 250 MG/1
750 TABLET ORAL DAILY
Qty: 15 TABLET | Refills: 0 | Status: SHIPPED | OUTPATIENT
Start: 2022-06-19 | End: 2022-06-24

## 2022-07-16 ENCOUNTER — HOSPITAL ENCOUNTER (EMERGENCY)
Facility: HOSPITAL | Age: 67
Discharge: HOME | End: 2022-07-16
Attending: EMERGENCY MEDICINE
Payer: MEDICARE

## 2022-07-16 VITALS
OXYGEN SATURATION: 99 % | RESPIRATION RATE: 18 BRPM | TEMPERATURE: 97 F | BODY MASS INDEX: 25.92 KG/M2 | WEIGHT: 175 LBS | HEIGHT: 69 IN | DIASTOLIC BLOOD PRESSURE: 100 MMHG | SYSTOLIC BLOOD PRESSURE: 198 MMHG | HEART RATE: 62 BPM

## 2022-07-16 DIAGNOSIS — N39.0 UTI (URINARY TRACT INFECTION): Primary | ICD-10-CM

## 2022-07-16 LAB
BACTERIA UR QL AUTO: ABNORMAL /HPF
BILIRUB UR QL STRIP: NEGATIVE
CLARITY UR: ABNORMAL
COLOR UR: YELLOW
GLUCOSE UR STRIP-MCNC: NEGATIVE MG/DL
HGB UR QL STRIP.AUTO: ABNORMAL
KETONES UR STRIP-MCNC: NEGATIVE MG/DL
LEUKOCYTE ESTERASE UR QL STRIP: ABNORMAL
NITRITE UR QL STRIP: NEGATIVE
NON-SQ EPI CELLS URNS QL MICRO: ABNORMAL /HPF
PH UR STRIP.AUTO: 8 [PH]
PROT UR STRIP-MCNC: ABNORMAL MG/DL
RBC #/AREA URNS AUTO: ABNORMAL /HPF
SP GR UR STRIP.AUTO: 1.01 (ref 1–1.03)
TRI-PHOS CRY URNS QL MICRO: ABNORMAL /HPF
UROBILINOGEN UR QL STRIP.AUTO: 0.2 E.U./DL
WBC #/AREA URNS AUTO: ABNORMAL /HPF

## 2022-07-16 PROCEDURE — 81001 URINALYSIS AUTO W/SCOPE: CPT | Performed by: PHYSICIAN ASSISTANT

## 2022-07-16 PROCEDURE — 99284 EMERGENCY DEPT VISIT MOD MDM: CPT | Performed by: PHYSICIAN ASSISTANT

## 2022-07-16 PROCEDURE — 99283 EMERGENCY DEPT VISIT LOW MDM: CPT

## 2022-07-16 RX ORDER — CEPHALEXIN 500 MG/1
500 CAPSULE ORAL EVERY 6 HOURS SCHEDULED
Qty: 28 CAPSULE | Refills: 0 | Status: SHIPPED | OUTPATIENT
Start: 2022-07-16 | End: 2022-07-23

## 2022-07-16 RX ORDER — CEPHALEXIN 500 MG/1
500 CAPSULE ORAL ONCE
Status: COMPLETED | OUTPATIENT
Start: 2022-07-16 | End: 2022-07-16

## 2022-07-16 RX ADMIN — CEPHALEXIN 500 MG: 500 CAPSULE ORAL at 22:46

## 2022-07-17 NOTE — ED PROVIDER NOTES
History  Chief Complaint   Patient presents with    Urinary Catheter Problem     Cath not draining,      68-year-old male presents to the emergency department seeking evaluation for blocked urinary catheter  Patient states that his urinary catheter stopped draining a few hours ago and now presents with suprapubic pain and tenderness  Patient states that he has been drinking a lot of water this afternoon  Patient denies any other complaints or concerns  Patient states his Ordonez catheter may need to be replaced  Allergies reviewed          Prior to Admission Medications   Prescriptions Last Dose Informant Patient Reported? Taking? Cholecalciferol (VITAMIN D3 PO) Not Taking at Unknown time  Yes No   Sig: Take by mouth   Patient not taking: No sig reported   GLUTATHIONE PO Not Taking at Unknown time  Yes No   Sig: Take by mouth daily   Patient not taking: No sig reported   HYDROcodone-acetaminophen (NORCO) 5-325 mg per tablet  Self No No   Sig: Take 1 tablet by mouth every 6 (six) hours as needed for pain for up to 5 dosesMax Daily Amount: 4 tablets   Multiple Vitamin (multivitamin) tablet Not Taking at Unknown time  Yes No   Sig: Take 1 tablet by mouth daily   Patient not taking: No sig reported   Multiple Vitamins-Minerals (ZINC PO) Not Taking at Unknown time  Yes No   Sig: Take by mouth   Patient not taking: No sig reported   QUERCETIN PO Not Taking at Unknown time  Yes No   Sig: Take by mouth daily   Patient not taking: No sig reported   amphetamine-dextroamphetamine (ADDERALL) 20 mg tablet 7/16/2022 at Unknown time Self Yes Yes   Sig: Take 20 mg by mouth in the morning and 20 mg at noon   2 tabs Am and 1 tab PM     finasteride (PROSCAR) 5 mg tablet  Self No No   Sig: Take 1 tablet (5 mg total) by mouth daily for 90 doses   Patient taking differently: Take 5 mg by mouth every evening    oxyCODONE-acetaminophen (PERCOCET) 5-325 mg per tablet  Self No No   Sig: Take 1 tablet by mouth every 4 (four) hours as needed for moderate painMax Daily Amount: 6 tablets   Patient not taking: Reported on 6/14/2021   phenazopyridine (PYRIDIUM) 100 mg tablet  Self No No   Sig: Take 1 tablet (100 mg total) by mouth 3 (three) times a day as needed for bladder spasms   tamsulosin (FLOMAX) 0 4 mg Not Taking at Unknown time Self Yes No   Sig: Take 0 4 mg by mouth daily with dinner 2 tabs   Patient not taking: Reported on 7/16/2022      Facility-Administered Medications: None       Past Medical History:   Diagnosis Date    ADD (attention deficit disorder)     Benign prostatic hyperplasia     Chronic kidney disease     Dental crowns present     Depression     Exercises 3 to 4 times per week     History of 2019 novel coronavirus disease (COVID-19) 01/2021    recovered at home/'pretty mild symptoms like the flu"    Indwelling Ordonez catheter present     Renal disorder     Retention of urine     Urinary retention     Wears glasses     reading       Past Surgical History:   Procedure Laterality Date    FRACTURE SURGERY Right     R  Hip Sx; screw implanted    [place] CYSTOURETHRO W/IMPLANT N/A 10/20/2021    Procedure: CYSTOSCOPY WITH INSERTION Kwesi Pitcher;  Surgeon: Almaz Mcfarland MD;  Location[facility];  Service: Urology    [place] REPAIR [address] HERNIA,5+Y/O,REDUCIBL Right 4/27/2021    Procedure: OPEN INGUINAL HERNIA REPAIR WITH MESH;  Surgeon: Ramon Burger MD;  Location: [facility] MAIN OR;  Service: General    [place] REPAIR UMBILICAL WDTX,8+H/N,ICGHQ N/A 4/27/2021    Procedure: OPEN UMBILICAL HERNIA REPAIR WITH MESH;  Surgeon: Ramon Burger MD;  Location: [facility] MAIN OR;  Service: General    TONSILLECTOMY      WISDOM TOOTH EXTRACTION         History reviewed  No pertinent family history  I have reviewed and agree with the history as documented      E-Cigarette/Vaping    E-Cigarette Use Never User      E-Cigarette/Vaping Substances    Nicotine No     THC No     CBD No     Flavoring No     Other No     Unknown No      Social History Tobacco Use    Smoking status: Former Smoker    Smokeless tobacco: Never Used   Vaping Use    Vaping Use: Never used   Substance Use Topics    Alcohol use: Never    Drug use: Never       Review of Systems   Constitutional: Negative for chills and fever  HENT: Negative for ear pain and sore throat  Eyes: Negative for pain and visual disturbance  Respiratory: Negative for cough and shortness of breath  Cardiovascular: Negative for chest pain and palpitations  Gastrointestinal: Positive for abdominal pain (Suprapubic tenderness)  Negative for vomiting  Genitourinary: Negative for dysuria and hematuria  Musculoskeletal: Negative for arthralgias and back pain  Skin: Negative for color change and rash  Neurological: Negative for seizures and syncope  All other systems reviewed and are negative  Physical Exam  Physical Exam  Vitals and nursing note reviewed  Constitutional:       Appearance: He is well-developed  HENT:      Head: Normocephalic and atraumatic  Right Ear: External ear normal       Left Ear: External ear normal       Nose: Nose normal    Eyes:      Conjunctiva/sclera: Conjunctivae normal    Cardiovascular:      Rate and Rhythm: Normal rate and regular rhythm  Heart sounds: No murmur heard  Pulmonary:      Effort: Pulmonary effort is normal  No respiratory distress  Breath sounds: Normal breath sounds  Abdominal:      Palpations: Abdomen is soft  Tenderness: There is abdominal tenderness in the suprapubic area  Musculoskeletal:      Cervical back: Neck supple  Skin:     General: Skin is warm and dry  Neurological:      Mental Status: He is alert           Vital Signs  ED Triage Vitals [07/16/22 2156]   Temperature Pulse Respirations Blood Pressure SpO2   (!) 97 °F (36 1 °C) 62 18 (!) 198/100 99 %      Temp Source Heart Rate Source Patient Position - Orthostatic VS BP Location FiO2 (%)   Tympanic Monitor Standing Left arm --      Pain Score 8           Vitals:    07/16/22 2156   BP: (!) 198/100   Pulse: 62   Patient Position - Orthostatic VS: Standing         Visual Acuity      ED Medications  Medications   cephalexin (KEFLEX) capsule 500 mg (500 mg Oral Given 7/16/22 2246)       Diagnostic Studies  Results Reviewed     Procedure Component Value Units Date/Time    Urine Microscopic [[id number]]  (Abnormal) Collected: 07/16/22 2232    Lab Status: Final result Specimen: Urine, Clean Catch Updated: 07/16/22 2258     RBC, UA 0-1 /hpf      WBC, UA 10-20 /hpf      Epithelial Cells Occasional /hpf      Bacteria, UA Occasional /hpf      Triplep Phos Sima, UA Moderate /hpf     UA (URINE) with reflex to Scope [[id number]]  (Abnormal) Collected: 07/16/22 2232    Lab Status: Final result Specimen: Urine, Clean Catch Updated: 07/16/22 2240     Color, UA Yellow     Clarity, UA Slightly Cloudy     Specific Gravity, UA 1 015     pH, UA 8 0     Leukocytes, UA 2+     Nitrite, UA Negative     Protein, UA 1+ mg/dl      Glucose, UA Negative mg/dl      Ketones, UA Negative mg/dl      Urobilinogen, UA 0 2 E U /dl      Bilirubin, UA Negative     Occult Blood, UA Trace-Intact                 No orders to display              Procedures  Procedures         ED Course                                             MDM  Number of Diagnoses or Management Options  UTI (urinary tract infection)  Diagnosis management comments: Ordonez catheter replaced by nursing staff  Large volume of urine briskly drained from the bladder  Patient reports immediate relief of symptoms  Leukocytes on UA  Will treat for UTI  Follow-up with Urology  Patient educated regarding their diagnosis and given return and follow-up instructions  Patient was advised to returned to the ED with worsening symptoms or concerns  Patient is understanding of and in agreement with the treatment plan  There are no questions at the time of discharge         Amount and/or Complexity of Data Reviewed  Clinical lab tests: ordered and reviewed    Risk of Complications, Morbidity, and/or Mortality  Presenting problems: low  Diagnostic procedures: low  Management options: low        Disposition  Final diagnoses:   UTI (urinary tract infection)     Time reflects when diagnosis was documented in both MDM as applicable and the Disposition within this note     Time User Action Codes Description Comment    7/16/2022 10:43 PM Nickie Stockton Add [N39 0] UTI (urinary tract infection)       ED Disposition     ED Disposition   Discharge    Condition   Stable    Date/Time   Sat Jul 16, 2022 10:43 PM    Comment   Sunil Crews discharge to home/self care  Follow-up Information    None         Discharge Medication List as of 7/16/2022 10:51 PM      START taking these medications    Details   cephalexin (KEFLEX) 500 mg capsule Take 1 capsule (500 mg total) by mouth every 6 (six) hours for 7 days, Starting Sat 7/16/2022, Until Sat 7/23/2022, Normal         CONTINUE these medications which have NOT CHANGED    Details   amphetamine-dextroamphetamine (ADDERALL) 20 mg tablet Take 20 mg by mouth in the morning and 20 mg at noon   2 tabs Am and 1 tab PM  , Historical Med      Cholecalciferol (VITAMIN D3 PO) Take by mouth, Historical Med      finasteride (PROSCAR) 5 mg tablet Take 1 tablet (5 mg total) by mouth daily for 90 doses, Starting Mon 6/14/2021, Until Tue 11/30/2021, Normal      GLUTATHIONE PO Take by mouth daily, Historical Med      HYDROcodone-acetaminophen (NORCO) 5-325 mg per tablet Take 1 tablet by mouth every 6 (six) hours as needed for pain for up to 5 dosesMax Daily Amount: 4 tablets, Starting Wed 10/20/2021, Normal      Multiple Vitamin (multivitamin) tablet Take 1 tablet by mouth daily, Historical Med      Multiple Vitamins-Minerals (ZINC PO) Take by mouth, Historical Med      oxyCODONE-acetaminophen (PERCOCET) 5-325 mg per tablet Take 1 tablet by mouth every 4 (four) hours as needed for moderate painMax Daily Amount: 6 tablets, Starting Tue 4/27/2021, Normal      phenazopyridine (PYRIDIUM) 100 mg tablet Take 1 tablet (100 mg total) by mouth 3 (three) times a day as needed for bladder spasms, Starting Wed 10/20/2021, Normal      QUERCETIN PO Take by mouth daily, Historical Med      tamsulosin (FLOMAX) 0 4 mg Take 0 4 mg by mouth daily with dinner 2 tabs, Starting Mon 3/1/2021, Historical Med             No discharge procedures on file      PDMP Review       Value Time User    PDMP Reviewed  Yes 4/27/2021  9:18 PM Micaela Lindquist MD          ED Provider  Electronically Signed by           Theresa Turcios PA-C  07/17/22 2543

## 2022-07-20 ENCOUNTER — PROCEDURE VISIT (OUTPATIENT)
Dept: UROLOGY | Facility: CLINIC | Age: 67
End: 2022-07-20
Payer: MEDICARE

## 2022-07-20 VITALS
HEIGHT: 69 IN | BODY MASS INDEX: 25.33 KG/M2 | RESPIRATION RATE: 20 BRPM | WEIGHT: 171 LBS | HEART RATE: 80 BPM | DIASTOLIC BLOOD PRESSURE: 80 MMHG | SYSTOLIC BLOOD PRESSURE: 120 MMHG

## 2022-07-20 DIAGNOSIS — R33.9 URINARY RETENTION: Primary | ICD-10-CM

## 2022-07-20 PROCEDURE — 99211 OFF/OP EST MAY X REQ PHY/QHP: CPT

## 2022-07-20 PROCEDURE — 51798 US URINE CAPACITY MEASURE: CPT

## 2022-07-20 NOTE — PROGRESS NOTES
7/20/2022    Felipe Quezada  1955  [id number]    Diagnosis  Chief Complaint     Urinary Retention          Patient presents for void trial managed by Dr Martin Sow  Will discuss follow up with Dr Polly Walsh    Procedure Ordonez removal/voiding trial    Ordonez catheter removed after deflation of an intact balloon  Patient tolerated well  Encouraged patient to hydrate well and return this afternoon for post void residual   he knows he may return early if uncomfortable and unable to urinate  Patient agrees to this plan  Patient returned this afternoon  Patient states unable to void  Patient voided 0 ml while in office  Patient willing to learn CIC  Reviewed procedure with patient  Patient attempted to pass 14 fr straight urethral catheter and unable to insert secondary to anatomy  Then patient utilized a 14 fr coude urethral catheter and able to insert without difficulty  Bladder drained for large amount of clear yellow urine  Catheter removed  Samples of 14 fr coudé [facility] [place] Hydrophilic catheter given to patient  Instructed patient to perform CIC 4x/day  Referral made to Gentle [facility]           Vitals:    07/20/22 0839   BP: 120/80   Pulse: 80   Resp: 20   Weight: 77 6 kg (171 lb)   Height: 5' 9" (1 753 m)           Governor JINNY Sierra

## 2022-07-21 ENCOUNTER — TELEPHONE (OUTPATIENT)
Dept: UROLOGY | Facility: CLINIC | Age: 67
End: 2022-07-21

## 2022-07-21 NOTE — TELEPHONE ENCOUNTER
Patient seen yesterday for CIC teaching  Called the patient and left a voicemail asking to call the office to discuss CIC teaching and supplies    Patient needs follow up in October 2022 with [facility]

## 2023-05-17 ENCOUNTER — APPOINTMENT (OUTPATIENT)
Dept: LAB | Facility: CLINIC | Age: 68
End: 2023-05-17

## 2023-05-17 DIAGNOSIS — N18.30 STAGE 3 CHRONIC KIDNEY DISEASE, UNSPECIFIED WHETHER STAGE 3A OR 3B CKD (HCC): ICD-10-CM

## 2023-05-17 DIAGNOSIS — Z01.83 ENCOUNTER FOR BLOOD TYPING: ICD-10-CM

## 2023-05-17 DIAGNOSIS — E78.5 DYSLIPIDEMIA: ICD-10-CM

## 2023-05-17 DIAGNOSIS — N42.9 PROSTATE DISORDER: ICD-10-CM

## 2023-05-18 LAB
ABO GROUP BLD: NORMAL
ALBUMIN SERPL BCP-MCNC: 3.9 G/DL (ref 3.5–5)
ALP SERPL-CCNC: 66 U/L (ref 46–116)
ALT SERPL W P-5'-P-CCNC: 25 U/L (ref 12–78)
ANION GAP SERPL CALCULATED.3IONS-SCNC: 2 MMOL/L (ref 4–13)
AST SERPL W P-5'-P-CCNC: 28 U/L (ref 5–45)
BASOPHILS # BLD AUTO: 0.08 THOUSANDS/ÂΜL (ref 0–0.1)
BASOPHILS NFR BLD AUTO: 1 % (ref 0–1)
BILIRUB SERPL-MCNC: 0.77 MG/DL (ref 0.2–1)
BUN SERPL-MCNC: 16 MG/DL (ref 5–25)
CALCIUM SERPL-MCNC: 9.1 MG/DL (ref 8.3–10.1)
CHLORIDE SERPL-SCNC: 103 MMOL/L (ref 96–108)
CHOLEST SERPL-MCNC: 142 MG/DL
CO2 SERPL-SCNC: 27 MMOL/L (ref 21–32)
CREAT SERPL-MCNC: 1.19 MG/DL (ref 0.6–1.3)
EOSINOPHIL # BLD AUTO: 0.07 THOUSAND/ÂΜL (ref 0–0.61)
EOSINOPHIL NFR BLD AUTO: 1 % (ref 0–6)
ERYTHROCYTE [DISTWIDTH] IN BLOOD BY AUTOMATED COUNT: 13 % (ref 11.6–15.1)
GFR SERPL CREATININE-BSD FRML MDRD: 61 ML/MIN/1.73SQ M
GLUCOSE P FAST SERPL-MCNC: 86 MG/DL (ref 65–99)
HCT VFR BLD AUTO: 47.4 % (ref 36.5–49.3)
HDLC SERPL-MCNC: 63 MG/DL
HGB BLD-MCNC: 15.4 G/DL (ref 12–17)
IMM GRANULOCYTES # BLD AUTO: 0.02 THOUSAND/UL (ref 0–0.2)
IMM GRANULOCYTES NFR BLD AUTO: 0 % (ref 0–2)
LDLC SERPL CALC-MCNC: 63 MG/DL (ref 0–100)
LYMPHOCYTES # BLD AUTO: 1.61 THOUSANDS/ÂΜL (ref 0.6–4.47)
LYMPHOCYTES NFR BLD AUTO: 19 % (ref 14–44)
MCH RBC QN AUTO: 28.7 PG (ref 26.8–34.3)
MCHC RBC AUTO-ENTMCNC: 32.5 G/DL (ref 31.4–37.4)
MCV RBC AUTO: 88 FL (ref 82–98)
MONOCYTES # BLD AUTO: 0.68 THOUSAND/ÂΜL (ref 0.17–1.22)
MONOCYTES NFR BLD AUTO: 8 % (ref 4–12)
NEUTROPHILS # BLD AUTO: 5.88 THOUSANDS/ÂΜL (ref 1.85–7.62)
NEUTS SEG NFR BLD AUTO: 71 % (ref 43–75)
NONHDLC SERPL-MCNC: 79 MG/DL
NRBC BLD AUTO-RTO: 0 /100 WBCS
PLATELET # BLD AUTO: 267 THOUSANDS/UL (ref 149–390)
PMV BLD AUTO: 11 FL (ref 8.9–12.7)
POTASSIUM SERPL-SCNC: 3.9 MMOL/L (ref 3.5–5.3)
PROT SERPL-MCNC: 7 G/DL (ref 6.4–8.4)
PSA SERPL-MCNC: 0.35 NG/ML (ref 0–4)
RBC # BLD AUTO: 5.37 MILLION/UL (ref 3.88–5.62)
RH BLD: POSITIVE
SODIUM SERPL-SCNC: 132 MMOL/L (ref 135–147)
TRIGL SERPL-MCNC: 78 MG/DL
WBC # BLD AUTO: 8.34 THOUSAND/UL (ref 4.31–10.16)

## 2023-08-27 ENCOUNTER — APPOINTMENT (EMERGENCY)
Dept: CT IMAGING | Facility: HOSPITAL | Age: 68
End: 2023-08-27
Payer: MEDICARE

## 2023-08-27 ENCOUNTER — HOSPITAL ENCOUNTER (INPATIENT)
Facility: HOSPITAL | Age: 68
LOS: 3 days | Discharge: HOME | DRG: 871 | End: 2023-08-30
Attending: INTERNAL MEDICINE | Admitting: INTERNAL MEDICINE
Payer: MEDICARE

## 2023-08-27 ENCOUNTER — APPOINTMENT (INPATIENT)
Dept: RADIOLOGY | Facility: HOSPITAL | Age: 68
DRG: 871 | End: 2023-08-27
Attending: RADIOLOGY
Payer: MEDICARE

## 2023-08-27 ENCOUNTER — ANESTHESIA EVENT (INPATIENT)
Dept: RADIOLOGY | Facility: HOSPITAL | Age: 68
DRG: 871 | End: 2023-08-27
Payer: MEDICARE

## 2023-08-27 ENCOUNTER — ANESTHESIA (INPATIENT)
Dept: RADIOLOGY | Facility: HOSPITAL | Age: 68
DRG: 871 | End: 2023-08-27
Payer: MEDICARE

## 2023-08-27 ENCOUNTER — HOSPITAL ENCOUNTER (EMERGENCY)
Facility: HOSPITAL | Age: 68
End: 2023-08-27
Attending: STUDENT IN AN ORGANIZED HEALTH CARE EDUCATION/TRAINING PROGRAM
Payer: MEDICARE

## 2023-08-27 ENCOUNTER — APPOINTMENT (EMERGENCY)
Dept: RADIOLOGY | Facility: HOSPITAL | Age: 68
End: 2023-08-27
Payer: MEDICARE

## 2023-08-27 VITALS
DIASTOLIC BLOOD PRESSURE: 58 MMHG | RESPIRATION RATE: 26 BRPM | BODY MASS INDEX: 25.76 KG/M2 | HEART RATE: 103 BPM | SYSTOLIC BLOOD PRESSURE: 105 MMHG | TEMPERATURE: 97 F | HEIGHT: 68 IN | OXYGEN SATURATION: 94 % | WEIGHT: 170 LBS

## 2023-08-27 DIAGNOSIS — N20.0 NEPHROLITHIASIS: Primary | ICD-10-CM

## 2023-08-27 DIAGNOSIS — R19.7 DIARRHEA: ICD-10-CM

## 2023-08-27 DIAGNOSIS — R65.21 SEPTIC SHOCK (HCC): Primary | ICD-10-CM

## 2023-08-27 DIAGNOSIS — K52.9 ENTEROCOLITIS: ICD-10-CM

## 2023-08-27 DIAGNOSIS — D72.829 LEUKOCYTOSIS: ICD-10-CM

## 2023-08-27 DIAGNOSIS — N20.0 KIDNEY STONE: ICD-10-CM

## 2023-08-27 DIAGNOSIS — A41.9 SEPTIC SHOCK (HCC): Primary | ICD-10-CM

## 2023-08-27 DIAGNOSIS — E87.6 HYPOKALEMIA: ICD-10-CM

## 2023-08-27 DIAGNOSIS — A41.9 SEPSIS (HCC): ICD-10-CM

## 2023-08-27 DIAGNOSIS — N19 RENAL FAILURE: ICD-10-CM

## 2023-08-27 DIAGNOSIS — E83.42 HYPOMAGNESEMIA: ICD-10-CM

## 2023-08-27 DIAGNOSIS — N17.9 AKI (ACUTE KIDNEY INJURY) (HCC): ICD-10-CM

## 2023-08-27 DIAGNOSIS — R78.81 GRAM-NEGATIVE BACTEREMIA: ICD-10-CM

## 2023-08-27 DIAGNOSIS — N13.30 HYDRONEPHROSIS: ICD-10-CM

## 2023-08-27 PROBLEM — N13.2 HYDRONEPHROSIS CONCURRENT WITH AND DUE TO CALCULI OF KIDNEY AND URETER: Status: ACTIVE | Noted: 2023-08-27

## 2023-08-27 LAB
ALBUMIN SERPL BCP-MCNC: 2.9 G/DL (ref 3.5–5)
ALP SERPL-CCNC: 48 U/L (ref 34–104)
ALT SERPL W P-5'-P-CCNC: 16 U/L (ref 7–52)
ANION GAP SERPL CALCULATED.3IONS-SCNC: 12 MMOL/L
ANION GAP SERPL CALCULATED.3IONS-SCNC: 21 MMOL/L
AST SERPL W P-5'-P-CCNC: 35 U/L (ref 13–39)
ATRIAL RATE: 108 BPM
BACTERIA UR QL AUTO: ABNORMAL /HPF
BASOPHILS # BLD MANUAL: 0 THOUSAND/UL (ref 0–0.1)
BASOPHILS NFR MAR MANUAL: 0 % (ref 0–1)
BILIRUB SERPL-MCNC: 0.41 MG/DL (ref 0.2–1)
BILIRUB UR QL STRIP: ABNORMAL
BUN SERPL-MCNC: 36 MG/DL (ref 5–25)
BUN SERPL-MCNC: 40 MG/DL (ref 5–25)
CALCIUM ALBUM COR SERPL-MCNC: 8.1 MG/DL (ref 8.3–10.1)
CALCIUM SERPL-MCNC: 7.2 MG/DL (ref 8.4–10.2)
CALCIUM SERPL-MCNC: 9.4 MG/DL (ref 8.4–10.2)
CHLORIDE SERPL-SCNC: 101 MMOL/L (ref 96–108)
CHLORIDE SERPL-SCNC: 96 MMOL/L (ref 96–108)
CLARITY UR: ABNORMAL
CO2 SERPL-SCNC: 24 MMOL/L (ref 21–32)
CO2 SERPL-SCNC: 26 MMOL/L (ref 21–32)
COLOR UR: YELLOW
CREAT SERPL-MCNC: 3.44 MG/DL (ref 0.6–1.3)
CREAT SERPL-MCNC: 4.35 MG/DL (ref 0.6–1.3)
EOSINOPHIL # BLD MANUAL: 0 THOUSAND/UL (ref 0–0.4)
EOSINOPHIL NFR BLD MANUAL: 0 % (ref 0–6)
ERYTHROCYTE [DISTWIDTH] IN BLOOD BY AUTOMATED COUNT: 13.7 % (ref 11.6–15.1)
ERYTHROCYTE [DISTWIDTH] IN BLOOD BY AUTOMATED COUNT: 13.7 % (ref 11.6–15.1)
FLUAV RNA RESP QL NAA+PROBE: NEGATIVE
FLUBV RNA RESP QL NAA+PROBE: NEGATIVE
GFR SERPL CREATININE-BSD FRML MDRD: 12 ML/MIN/1.73SQ M
GFR SERPL CREATININE-BSD FRML MDRD: 17 ML/MIN/1.73SQ M
GIANT PLATELETS BLD QL SMEAR: PRESENT
GLUCOSE SERPL-MCNC: 111 MG/DL (ref 65–140)
GLUCOSE SERPL-MCNC: 139 MG/DL (ref 65–140)
GLUCOSE UR STRIP-MCNC: NEGATIVE MG/DL
HCT VFR BLD AUTO: 38.5 % (ref 36.5–49.3)
HCT VFR BLD AUTO: 47.7 % (ref 36.5–49.3)
HGB BLD-MCNC: 12.7 G/DL (ref 12–17)
HGB BLD-MCNC: 15.8 G/DL (ref 12–17)
HGB UR QL STRIP.AUTO: ABNORMAL
INR PPP: 1.77 (ref 0.84–1.19)
KETONES UR STRIP-MCNC: ABNORMAL MG/DL
LACTATE SERPL-SCNC: 10 MMOL/L (ref 0.5–2)
LACTATE SERPL-SCNC: 2.8 MMOL/L (ref 0.5–2)
LACTATE SERPL-SCNC: 3.8 MMOL/L (ref 0.5–2)
LACTATE SERPL-SCNC: 5.6 MMOL/L (ref 0.5–2)
LEUKOCYTE ESTERASE UR QL STRIP: ABNORMAL
LYMPHOCYTES # BLD AUTO: 0.63 THOUSAND/UL (ref 0.6–4.47)
LYMPHOCYTES # BLD AUTO: 2 % (ref 14–44)
MAGNESIUM SERPL-MCNC: 1.6 MG/DL (ref 1.9–2.7)
MAGNESIUM SERPL-MCNC: 2.1 MG/DL (ref 1.9–2.7)
MCH RBC QN AUTO: 29.1 PG (ref 26.8–34.3)
MCH RBC QN AUTO: 29.9 PG (ref 26.8–34.3)
MCHC RBC AUTO-ENTMCNC: 33 G/DL (ref 31.4–37.4)
MCHC RBC AUTO-ENTMCNC: 33.1 G/DL (ref 31.4–37.4)
MCV RBC AUTO: 88 FL (ref 82–98)
MCV RBC AUTO: 90 FL (ref 82–98)
MONOCYTES # BLD AUTO: 2.2 THOUSAND/UL (ref 0–1.22)
MONOCYTES NFR BLD: 7 % (ref 4–12)
MUCOUS THREADS UR QL AUTO: ABNORMAL
NEUTROPHILS # BLD MANUAL: 28.64 THOUSAND/UL (ref 1.85–7.62)
NEUTS BAND NFR BLD MANUAL: 9 % (ref 0–8)
NEUTS SEG NFR BLD AUTO: 82 % (ref 43–75)
NITRITE UR QL STRIP: POSITIVE
NON-SQ EPI CELLS URNS QL MICRO: ABNORMAL /HPF
NRBC BLD AUTO-RTO: 0 /100 WBCS
P AXIS: 76 DEGREES
PH UR STRIP.AUTO: 6.5 [PH]
PHOSPHATE SERPL-MCNC: 3.3 MG/DL (ref 2.3–4.1)
PLATELET # BLD AUTO: 135 THOUSANDS/UL (ref 149–390)
PLATELET # BLD AUTO: 138 THOUSANDS/UL (ref 149–390)
PLATELET # BLD AUTO: 176 THOUSANDS/UL (ref 149–390)
PLATELET BLD QL SMEAR: ADEQUATE
PMV BLD AUTO: 10.1 FL (ref 8.9–12.7)
PMV BLD AUTO: 10.1 FL (ref 8.9–12.7)
PMV BLD AUTO: 10.4 FL (ref 8.9–12.7)
POTASSIUM SERPL-SCNC: 3 MMOL/L (ref 3.5–5.3)
POTASSIUM SERPL-SCNC: 3.2 MMOL/L (ref 3.5–5.3)
PR INTERVAL: 202 MS
PROT SERPL-MCNC: 4.9 G/DL (ref 6.4–8.4)
PROT UR STRIP-MCNC: ABNORMAL MG/DL
PROTHROMBIN TIME: 20.6 SECONDS (ref 11.6–14.5)
QRS AXIS: 73 DEGREES
QRSD INTERVAL: 82 MS
QT INTERVAL: 378 MS
QTC INTERVAL: 506 MS
RBC # BLD AUTO: 4.37 MILLION/UL (ref 3.88–5.62)
RBC # BLD AUTO: 5.28 MILLION/UL (ref 3.88–5.62)
RBC #/AREA URNS AUTO: ABNORMAL /HPF
RBC MORPH BLD: NORMAL
RSV RNA RESP QL NAA+PROBE: NEGATIVE
SARS-COV-2 RNA RESP QL NAA+PROBE: NEGATIVE
SODIUM SERPL-SCNC: 139 MMOL/L (ref 135–147)
SODIUM SERPL-SCNC: 141 MMOL/L (ref 135–147)
SP GR UR STRIP.AUTO: 1.02
T WAVE AXIS: 77 DEGREES
UROBILINOGEN UR QL STRIP.AUTO: 0.2 E.U./DL
VENTRICULAR RATE: 108 BPM
WBC # BLD AUTO: 26.41 THOUSAND/UL (ref 4.31–10.16)
WBC # BLD AUTO: 31.47 THOUSAND/UL (ref 4.31–10.16)
WBC #/AREA URNS AUTO: ABNORMAL /HPF

## 2023-08-27 PROCEDURE — 80048 BASIC METABOLIC PNL TOTAL CA: CPT | Performed by: STUDENT IN AN ORGANIZED HEALTH CARE EDUCATION/TRAINING PROGRAM

## 2023-08-27 PROCEDURE — 96367 TX/PROPH/DG ADDL SEQ IV INF: CPT

## 2023-08-27 PROCEDURE — 87040 BLOOD CULTURE FOR BACTERIA: CPT | Performed by: STUDENT IN AN ORGANIZED HEALTH CARE EDUCATION/TRAINING PROGRAM

## 2023-08-27 PROCEDURE — 99292 CRITICAL CARE ADDL 30 MIN: CPT | Performed by: STUDENT IN AN ORGANIZED HEALTH CARE EDUCATION/TRAINING PROGRAM

## 2023-08-27 PROCEDURE — 87505 NFCT AGENT DETECTION GI: CPT | Performed by: STUDENT IN AN ORGANIZED HEALTH CARE EDUCATION/TRAINING PROGRAM

## 2023-08-27 PROCEDURE — 87186 SC STD MICRODIL/AGAR DIL: CPT | Performed by: STUDENT IN AN ORGANIZED HEALTH CARE EDUCATION/TRAINING PROGRAM

## 2023-08-27 PROCEDURE — 83605 ASSAY OF LACTIC ACID: CPT | Performed by: NURSE PRACTITIONER

## 2023-08-27 PROCEDURE — 71045 X-RAY EXAM CHEST 1 VIEW: CPT

## 2023-08-27 PROCEDURE — 83735 ASSAY OF MAGNESIUM: CPT | Performed by: NURSE PRACTITIONER

## 2023-08-27 PROCEDURE — 74176 CT ABD & PELVIS W/O CONTRAST: CPT

## 2023-08-27 PROCEDURE — 84100 ASSAY OF PHOSPHORUS: CPT | Performed by: NURSE PRACTITIONER

## 2023-08-27 PROCEDURE — 50432 PLMT NEPHROSTOMY CATHETER: CPT

## 2023-08-27 PROCEDURE — 87077 CULTURE AEROBIC IDENTIFY: CPT | Performed by: STUDENT IN AN ORGANIZED HEALTH CARE EDUCATION/TRAINING PROGRAM

## 2023-08-27 PROCEDURE — 85007 BL SMEAR W/DIFF WBC COUNT: CPT | Performed by: STUDENT IN AN ORGANIZED HEALTH CARE EDUCATION/TRAINING PROGRAM

## 2023-08-27 PROCEDURE — 87205 SMEAR GRAM STAIN: CPT | Performed by: INTERNAL MEDICINE

## 2023-08-27 PROCEDURE — 83735 ASSAY OF MAGNESIUM: CPT | Performed by: STUDENT IN AN ORGANIZED HEALTH CARE EDUCATION/TRAINING PROGRAM

## 2023-08-27 PROCEDURE — 96365 THER/PROPH/DIAG IV INF INIT: CPT

## 2023-08-27 PROCEDURE — 87186 SC STD MICRODIL/AGAR DIL: CPT | Performed by: INTERNAL MEDICINE

## 2023-08-27 PROCEDURE — 87070 CULTURE OTHR SPECIMN AEROBIC: CPT | Performed by: INTERNAL MEDICINE

## 2023-08-27 PROCEDURE — 96361 HYDRATE IV INFUSION ADD-ON: CPT

## 2023-08-27 PROCEDURE — 80053 COMPREHEN METABOLIC PANEL: CPT | Performed by: NURSE PRACTITIONER

## 2023-08-27 PROCEDURE — 93010 ELECTROCARDIOGRAM REPORT: CPT | Performed by: INTERNAL MEDICINE

## 2023-08-27 PROCEDURE — BT1FYZZ FLUOROSCOPY OF LEFT KIDNEY, URETER AND BLADDER USING OTHER CONTRAST: ICD-10-PCS | Performed by: RADIOLOGY

## 2023-08-27 PROCEDURE — 85027 COMPLETE CBC AUTOMATED: CPT | Performed by: NURSE PRACTITIONER

## 2023-08-27 PROCEDURE — G1004 CDSM NDSC: HCPCS

## 2023-08-27 PROCEDURE — 85049 AUTOMATED PLATELET COUNT: CPT | Performed by: NURSE PRACTITIONER

## 2023-08-27 PROCEDURE — 96368 THER/DIAG CONCURRENT INF: CPT

## 2023-08-27 PROCEDURE — 93005 ELECTROCARDIOGRAM TRACING: CPT

## 2023-08-27 PROCEDURE — C1729 CATH, DRAINAGE: HCPCS

## 2023-08-27 PROCEDURE — 87077 CULTURE AEROBIC IDENTIFY: CPT | Performed by: INTERNAL MEDICINE

## 2023-08-27 PROCEDURE — NC001 PR NO CHARGE: Performed by: RADIOLOGY

## 2023-08-27 PROCEDURE — 99291 CRITICAL CARE FIRST HOUR: CPT | Performed by: STUDENT IN AN ORGANIZED HEALTH CARE EDUCATION/TRAINING PROGRAM

## 2023-08-27 PROCEDURE — 0241U HB NFCT DS VIR RESP RNA 4 TRGT: CPT | Performed by: STUDENT IN AN ORGANIZED HEALTH CARE EDUCATION/TRAINING PROGRAM

## 2023-08-27 PROCEDURE — 85027 COMPLETE CBC AUTOMATED: CPT | Performed by: STUDENT IN AN ORGANIZED HEALTH CARE EDUCATION/TRAINING PROGRAM

## 2023-08-27 PROCEDURE — 0T9430Z DRAINAGE OF LEFT KIDNEY PELVIS WITH DRAINAGE DEVICE, PERCUTANEOUS APPROACH: ICD-10-PCS | Performed by: RADIOLOGY

## 2023-08-27 PROCEDURE — 99285 EMERGENCY DEPT VISIT HI MDM: CPT

## 2023-08-27 PROCEDURE — 50432 PLMT NEPHROSTOMY CATHETER: CPT | Performed by: RADIOLOGY

## 2023-08-27 PROCEDURE — 99291 CRITICAL CARE FIRST HOUR: CPT | Performed by: INTERNAL MEDICINE

## 2023-08-27 PROCEDURE — 87154 CUL TYP ID BLD PTHGN 6+ TRGT: CPT | Performed by: STUDENT IN AN ORGANIZED HEALTH CARE EDUCATION/TRAINING PROGRAM

## 2023-08-27 PROCEDURE — 85610 PROTHROMBIN TIME: CPT | Performed by: STUDENT IN AN ORGANIZED HEALTH CARE EDUCATION/TRAINING PROGRAM

## 2023-08-27 PROCEDURE — 87086 URINE CULTURE/COLONY COUNT: CPT | Performed by: STUDENT IN AN ORGANIZED HEALTH CARE EDUCATION/TRAINING PROGRAM

## 2023-08-27 PROCEDURE — C1769 GUIDE WIRE: HCPCS

## 2023-08-27 PROCEDURE — 83605 ASSAY OF LACTIC ACID: CPT | Performed by: STUDENT IN AN ORGANIZED HEALTH CARE EDUCATION/TRAINING PROGRAM

## 2023-08-27 PROCEDURE — 87147 CULTURE TYPE IMMUNOLOGIC: CPT | Performed by: STUDENT IN AN ORGANIZED HEALTH CARE EDUCATION/TRAINING PROGRAM

## 2023-08-27 PROCEDURE — 99223 1ST HOSP IP/OBS HIGH 75: CPT | Performed by: STUDENT IN AN ORGANIZED HEALTH CARE EDUCATION/TRAINING PROGRAM

## 2023-08-27 PROCEDURE — 81001 URINALYSIS AUTO W/SCOPE: CPT | Performed by: STUDENT IN AN ORGANIZED HEALTH CARE EDUCATION/TRAINING PROGRAM

## 2023-08-27 PROCEDURE — 36415 COLL VENOUS BLD VENIPUNCTURE: CPT | Performed by: STUDENT IN AN ORGANIZED HEALTH CARE EDUCATION/TRAINING PROGRAM

## 2023-08-27 PROCEDURE — 36556 INSERT NON-TUNNEL CV CATH: CPT | Performed by: STUDENT IN AN ORGANIZED HEALTH CARE EDUCATION/TRAINING PROGRAM

## 2023-08-27 PROCEDURE — C1894 INTRO/SHEATH, NON-LASER: HCPCS

## 2023-08-27 PROCEDURE — 87493 C DIFF AMPLIFIED PROBE: CPT | Performed by: STUDENT IN AN ORGANIZED HEALTH CARE EDUCATION/TRAINING PROGRAM

## 2023-08-27 PROCEDURE — 76937 US GUIDE VASCULAR ACCESS: CPT

## 2023-08-27 RX ORDER — HEPARIN SODIUM 5000 [USP'U]/ML
5000 INJECTION, SOLUTION INTRAVENOUS; SUBCUTANEOUS EVERY 8 HOURS SCHEDULED
Status: DISCONTINUED | OUTPATIENT
Start: 2023-08-27 | End: 2023-08-30 | Stop reason: HOSPADM

## 2023-08-27 RX ORDER — CHLORHEXIDINE GLUCONATE 0.12 MG/ML
15 RINSE ORAL EVERY 12 HOURS SCHEDULED
Status: DISCONTINUED | OUTPATIENT
Start: 2023-08-27 | End: 2023-08-29

## 2023-08-27 RX ORDER — SODIUM CHLORIDE 9 MG/ML
10 INJECTION INTRAVENOUS DAILY
Qty: 300 ML | Refills: 2 | Status: SHIPPED | OUTPATIENT
Start: 2023-08-27 | End: 2023-11-25

## 2023-08-27 RX ORDER — SODIUM CHLORIDE, SODIUM GLUCONATE, SODIUM ACETATE, POTASSIUM CHLORIDE, MAGNESIUM CHLORIDE, SODIUM PHOSPHATE, DIBASIC, AND POTASSIUM PHOSPHATE .53; .5; .37; .037; .03; .012; .00082 G/100ML; G/100ML; G/100ML; G/100ML; G/100ML; G/100ML; G/100ML
1000 INJECTION, SOLUTION INTRAVENOUS ONCE
Status: DISCONTINUED | OUTPATIENT
Start: 2023-08-27 | End: 2023-08-27 | Stop reason: HOSPADM

## 2023-08-27 RX ORDER — METRONIDAZOLE 500 MG/100ML
500 INJECTION, SOLUTION INTRAVENOUS EVERY 8 HOURS
Status: DISCONTINUED | OUTPATIENT
Start: 2023-08-27 | End: 2023-08-27 | Stop reason: HOSPADM

## 2023-08-27 RX ORDER — ONDANSETRON 2 MG/ML
4 INJECTION INTRAMUSCULAR; INTRAVENOUS EVERY 6 HOURS PRN
Status: DISCONTINUED | OUTPATIENT
Start: 2023-08-27 | End: 2023-08-30 | Stop reason: HOSPADM

## 2023-08-27 RX ORDER — DEXTROAMPHETAMINE SACCHARATE, AMPHETAMINE ASPARTATE, DEXTROAMPHETAMINE SULFATE AND AMPHETAMINE SULFATE 2.5; 2.5; 2.5; 2.5 MG/1; MG/1; MG/1; MG/1
20 TABLET ORAL
Status: DISCONTINUED | OUTPATIENT
Start: 2023-08-27 | End: 2023-08-29

## 2023-08-27 RX ORDER — LIDOCAINE WITH 8.4% SOD BICARB 0.9%(10ML)
SYRINGE (ML) INJECTION AS NEEDED
Status: COMPLETED | OUTPATIENT
Start: 2023-08-27 | End: 2023-08-27

## 2023-08-27 RX ORDER — SODIUM CHLORIDE, SODIUM GLUCONATE, SODIUM ACETATE, POTASSIUM CHLORIDE, MAGNESIUM CHLORIDE, SODIUM PHOSPHATE, DIBASIC, AND POTASSIUM PHOSPHATE .53; .5; .37; .037; .03; .012; .00082 G/100ML; G/100ML; G/100ML; G/100ML; G/100ML; G/100ML; G/100ML
1000 INJECTION, SOLUTION INTRAVENOUS ONCE
Status: COMPLETED | OUTPATIENT
Start: 2023-08-27 | End: 2023-08-27

## 2023-08-27 RX ORDER — POTASSIUM CHLORIDE 14.9 MG/ML
20 INJECTION INTRAVENOUS ONCE
Status: DISCONTINUED | OUTPATIENT
Start: 2023-08-27 | End: 2023-08-27 | Stop reason: HOSPADM

## 2023-08-27 RX ORDER — FENTANYL CITRATE 50 UG/ML
25 INJECTION, SOLUTION INTRAMUSCULAR; INTRAVENOUS
Status: DISCONTINUED | OUTPATIENT
Start: 2023-08-27 | End: 2023-08-28

## 2023-08-27 RX ORDER — HYDROCODONE BITARTRATE AND ACETAMINOPHEN 5; 325 MG/1; MG/1
1 TABLET ORAL EVERY 6 HOURS PRN
Status: DISCONTINUED | OUTPATIENT
Start: 2023-08-27 | End: 2023-08-30 | Stop reason: HOSPADM

## 2023-08-27 RX ORDER — ROCURONIUM BROMIDE 10 MG/ML
INJECTION, SOLUTION INTRAVENOUS AS NEEDED
Status: DISCONTINUED | OUTPATIENT
Start: 2023-08-27 | End: 2023-08-27

## 2023-08-27 RX ORDER — SODIUM CHLORIDE 9 MG/ML
INJECTION, SOLUTION INTRAVENOUS CONTINUOUS PRN
Status: DISCONTINUED | OUTPATIENT
Start: 2023-08-27 | End: 2023-08-27

## 2023-08-27 RX ORDER — MAGNESIUM SULFATE HEPTAHYDRATE 40 MG/ML
2 INJECTION, SOLUTION INTRAVENOUS ONCE
Status: DISCONTINUED | OUTPATIENT
Start: 2023-08-27 | End: 2023-08-27 | Stop reason: HOSPADM

## 2023-08-27 RX ORDER — PANTOPRAZOLE SODIUM 40 MG/1
40 TABLET, DELAYED RELEASE ORAL
Status: DISCONTINUED | OUTPATIENT
Start: 2023-08-28 | End: 2023-08-30 | Stop reason: HOSPADM

## 2023-08-27 RX ORDER — MAGNESIUM HYDROXIDE/ALUMINUM HYDROXICE/SIMETHICONE 120; 1200; 1200 MG/30ML; MG/30ML; MG/30ML
30 SUSPENSION ORAL EVERY 4 HOURS PRN
Status: DISCONTINUED | OUTPATIENT
Start: 2023-08-27 | End: 2023-08-30 | Stop reason: HOSPADM

## 2023-08-27 RX ORDER — FENTANYL CITRATE 50 UG/ML
INJECTION, SOLUTION INTRAMUSCULAR; INTRAVENOUS AS NEEDED
Status: DISCONTINUED | OUTPATIENT
Start: 2023-08-27 | End: 2023-08-27

## 2023-08-27 RX ORDER — MIDAZOLAM HYDROCHLORIDE 2 MG/2ML
INJECTION, SOLUTION INTRAMUSCULAR; INTRAVENOUS AS NEEDED
Status: DISCONTINUED | OUTPATIENT
Start: 2023-08-27 | End: 2023-08-27

## 2023-08-27 RX ORDER — ONDANSETRON 2 MG/ML
INJECTION INTRAMUSCULAR; INTRAVENOUS AS NEEDED
Status: DISCONTINUED | OUTPATIENT
Start: 2023-08-27 | End: 2023-08-27

## 2023-08-27 RX ORDER — PROPOFOL 10 MG/ML
INJECTION, EMULSION INTRAVENOUS AS NEEDED
Status: DISCONTINUED | OUTPATIENT
Start: 2023-08-27 | End: 2023-08-27

## 2023-08-27 RX ADMIN — PIPERACILLIN SODIUM AND TAZOBACTAM SODIUM 3.38 G: 36; 4.5 INJECTION, POWDER, LYOPHILIZED, FOR SOLUTION INTRAVENOUS at 17:03

## 2023-08-27 RX ADMIN — HEPARIN SODIUM 5000 UNITS: 5000 INJECTION INTRAVENOUS; SUBCUTANEOUS at 22:11

## 2023-08-27 RX ADMIN — SODIUM CHLORIDE, SODIUM GLUCONATE, SODIUM ACETATE, POTASSIUM CHLORIDE, MAGNESIUM CHLORIDE, SODIUM PHOSPHATE, DIBASIC, AND POTASSIUM PHOSPHATE 1000 ML: .53; .5; .37; .037; .03; .012; .00082 INJECTION, SOLUTION INTRAVENOUS at 09:13

## 2023-08-27 RX ADMIN — POTASSIUM CHLORIDE 20 MEQ: 14.9 INJECTION, SOLUTION INTRAVENOUS at 06:20

## 2023-08-27 RX ADMIN — HEPARIN SODIUM 5000 UNITS: 5000 INJECTION INTRAVENOUS; SUBCUTANEOUS at 09:13

## 2023-08-27 RX ADMIN — METRONIDAZOLE 500 MG: 500 INJECTION, SOLUTION INTRAVENOUS at 04:44

## 2023-08-27 RX ADMIN — MAGNESIUM SULFATE HEPTAHYDRATE 2 G: 40 INJECTION, SOLUTION INTRAVENOUS at 06:32

## 2023-08-27 RX ADMIN — MIDAZOLAM 2 MG: 1 INJECTION INTRAMUSCULAR; INTRAVENOUS at 10:27

## 2023-08-27 RX ADMIN — SODIUM CHLORIDE, SODIUM GLUCONATE, SODIUM ACETATE, POTASSIUM CHLORIDE, MAGNESIUM CHLORIDE, SODIUM PHOSPHATE, DIBASIC, AND POTASSIUM PHOSPHATE 1000 ML: .53; .5; .37; .037; .03; .012; .00082 INJECTION, SOLUTION INTRAVENOUS at 10:39

## 2023-08-27 RX ADMIN — SODIUM CHLORIDE 1000 ML: 0.9 INJECTION, SOLUTION INTRAVENOUS at 05:46

## 2023-08-27 RX ADMIN — IOHEXOL 25 ML: 350 INJECTION, SOLUTION INTRAVENOUS at 11:31

## 2023-08-27 RX ADMIN — SODIUM CHLORIDE: 0.9 INJECTION, SOLUTION INTRAVENOUS at 10:26

## 2023-08-27 RX ADMIN — SODIUM CHLORIDE, SODIUM GLUCONATE, SODIUM ACETATE, POTASSIUM CHLORIDE, MAGNESIUM CHLORIDE, SODIUM PHOSPHATE, DIBASIC, AND POTASSIUM PHOSPHATE 1000 ML: .53; .5; .37; .037; .03; .012; .00082 INJECTION, SOLUTION INTRAVENOUS at 12:33

## 2023-08-27 RX ADMIN — ALUMINUM HYDROXIDE, MAGNESIUM HYDROXIDE, AND DIMETHICONE 30 ML: 200; 20; 200 SUSPENSION ORAL at 17:03

## 2023-08-27 RX ADMIN — FENTANYL CITRATE 25 MCG: 50 INJECTION INTRAMUSCULAR; INTRAVENOUS at 10:27

## 2023-08-27 RX ADMIN — Medication 3 G: at 05:21

## 2023-08-27 RX ADMIN — Medication 10 ML: at 10:57

## 2023-08-27 RX ADMIN — ONDANSETRON 4 MG: 2 INJECTION INTRAMUSCULAR; INTRAVENOUS at 10:43

## 2023-08-27 RX ADMIN — NOREPINEPHRINE BITARTRATE 4 MCG/MIN: 1 INJECTION, SOLUTION, CONCENTRATE INTRAVENOUS at 09:14

## 2023-08-27 RX ADMIN — FENTANYL CITRATE 25 MCG: 50 INJECTION INTRAMUSCULAR; INTRAVENOUS at 11:19

## 2023-08-27 RX ADMIN — SUGAMMADEX 200 MG: 100 INJECTION, SOLUTION INTRAVENOUS at 11:23

## 2023-08-27 RX ADMIN — SODIUM CHLORIDE 1000 ML: 0.9 INJECTION, SOLUTION INTRAVENOUS at 03:37

## 2023-08-27 RX ADMIN — NOREPINEPHRINE BITARTRATE 2 MCG/MIN: 1 SOLUTION INTRAVENOUS at 06:39

## 2023-08-27 RX ADMIN — PIPERACILLIN SODIUM AND TAZOBACTAM SODIUM 3.38 G: 36; 4.5 INJECTION, POWDER, LYOPHILIZED, FOR SOLUTION INTRAVENOUS at 10:46

## 2023-08-27 RX ADMIN — ROCURONIUM BROMIDE 25 MG: 10 INJECTION, SOLUTION INTRAVENOUS at 10:32

## 2023-08-27 RX ADMIN — HYDROCODONE BITARTRATE AND ACETAMINOPHEN 1 TABLET: 5; 325 TABLET ORAL at 09:13

## 2023-08-27 RX ADMIN — PROPOFOL 150 MG: 10 INJECTION, EMULSION INTRAVENOUS at 10:32

## 2023-08-27 RX ADMIN — CHLORHEXIDINE GLUCONATE 15 ML: 1.2 RINSE ORAL at 22:11

## 2023-08-27 RX ADMIN — SODIUM CHLORIDE 1000 ML: 0.9 INJECTION, SOLUTION INTRAVENOUS at 04:15

## 2023-08-27 RX ADMIN — CHLORHEXIDINE GLUCONATE 15 ML: 1.2 RINSE ORAL at 09:14

## 2023-08-27 RX ADMIN — DEXTROAMPHETAMINE SACCHARATE, AMPHETAMINE ASPARTATE, DEXTROAMPHETAMINE SULFATE AND AMPHETAMINE SULFATE 20 MG: 2.5; 2.5; 2.5; 2.5 TABLET ORAL at 09:12

## 2023-08-27 NOTE — PROGRESS NOTES
The patient’s standard-infusion Piperacillin-Tazobactam / Zosyn (infused over 30-60 minutes) has been converted to extended-infusion (infused over 4 hours) per [facility], [facility] Extended-Infusion Piperacillin-Tazobactam Protocol for Adults as approved by the Pharmacy and Therapeutics Committee (accessible here on MyNET).      The patient met ALL eligible criteria:    Age >= 25years old   Critical Care patient    And did NOT have ANY exclusions:     Emergency Department or Operating Room patient  Drug incompatibilities that could NOT be avoided with timing or separate line administration    The following are reminders for Nursing regarding administration:  Infuse the first dose of Zosyn over 30min as a load (if new start), and then all subsequent doses will be given as an extended-infusion over 4 hours (see dosing below)  Use primary tubing as an intermittent infusion; change out primary tubing every 24 hours   Ensure full dose of the medication is given at the appropriate rate  Most incompatible drugs can be scheduled during times when the Zosyn is not being infused; however, if one requires administration during the same time, a separate site or lumen MUST be used  If access is limited and an incompatible medication urgently needs to be given, the Zosyn extended-infusion can be held for up to 30min (remember to flush line before/after)  Extended-infusion Zosyn does NOT require special timing around hemodialysis (it can even be given simultaneously)  If a patient needs an urgent MRI while Zosyn is infusing and there is not a MRI-compatible pump available for use, finish the infusion over the traditional length (30min) and ask Pharmacy to reschedule the next doses so that they start a few hours earlier  Pharmacy will assist nursing in troubleshooting other administration issues as they arise  Dosing for Piperacillin-Tazobactam  CrCl (mL/min) Traditional Dosing Extended-Infusion Dosing #   CrCl > 40 High-Dose  CrCl > 40 Low-Dose 4.5g Q6H (over 30min)  3.375g IV Q6H (over 30min) 3.375g IV Q8H (over 4hr)*    *1st dose loaded over 30min, then start extended-infusion dosing 4hr later   CrCl 20-40 High-Dose  CrCl 20-40 Low-Dose 3.375g IV Q6H (over 30min)  2.25g IV Q6H (over 30min)    CrCl < 20 High-Dose  CrCl < 20 Low-Dose 2.25g IV Q6H (over 30min)  2.25g IV Q8H (over 30min) 3.375g IV Q12H (over 4hr)*    *1st dose loaded over 30min, then start extended-infusion dosing 6hr later   Hemo/Peritoneal Dialysis High-Dose  Hemo/Peritoneal Dialysis Low-Dose 2.25g IV Q6H (over 30min)  2.25g IV Q8H (over 30min)    CVVH/D High-Dose  CVVH/D Low-Dose 3.375g IV Q6H (over 30min)  2.25 IV Q6H (over 30min) 3.375g IV Q8H (over 4hr)*    *1st dose loaded over 30min, then start extended-infusion dosing 4hr later   # = Use 4.5g dosing (same interval) if morbidly obese (BMI ?40)    Please call the Pharmacy with any questions or concerns.

## 2023-08-27 NOTE — ED NOTES
Telephone report to Kasie Hamlin at [facility] ICU at [address] [facility], [address] [place] [address]  08/27/23 [id number]

## 2023-08-27 NOTE — BRIEF OP NOTE (RAD/CATH)
INTERVENTIONAL RADIOLOGY PROCEDURE NOTE    Date: 8/27/2023    Procedure: IR NEPHROSTOMY TUBE PLACEMENT     Preoperative diagnosis:   1. Nephrolithiasis       Postoperative diagnosis: Same. Surgeon: Liliana Cockayne, MD     Assistant: None. No qualified resident was available. Blood loss: minimal    Specimens: urine    Findings: Successful left nephrostomy tube placement with purulent urine removed and sent for analysis. Plan:  Tube to bag drainage, flush q8 with 10cc NSS in the hospital and qd at home and return in 2 months for a tube change. Complications: None immediate.     Anesthesia: general anesthesia

## 2023-08-27 NOTE — CONSULTS
e-Consult (IPC)  - Interventional Radiology  Joan Blackwell 77 y.o. male MRN: [id number]  Unit/Bed#: ED 02 Encounter: [id number]    Interventional Radiology has been consulted to evaluate Joan Blackwell    We were consulted by Dr. Gini Isbell concerning this patient with urosepsis. Consults  08/27/23    Assessment/Recommendation:   Urosepsis with a large obstructing proximal left ureteral calculus and marked left hydronephrosis. Patient being started on Levophed due to hypotension. Patient being resuscitated and transferred from [facility] ED to [facility] ICU. IR to perform a left nephrostomy tube this morning with anesthesia. 21-30 minutes, >50% of the total time devoted to medical consultative verbal/EMR discussion between providers. Written report will be generated in the EMR. Thank you for allowing Interventional Radiology to participate in the care of Joan Blackwell. Please don't hesitate to call or TigerText us with any questions.      Abdoulaye Mims MD

## 2023-08-27 NOTE — EMTALA/ACUTE CARE TRANSFER
[address] [place] [id number]  Dept: [phone]      EMTALA TRANSFER CONSENT    NAME Uma Mccarthy                                         1955                              MRN [id number]    I have been informed of my rights regarding examination, treatment, and transfer   by Dr. Herlinda Whittaker MD    Benefits:      Risks:        Transfer Request   I acknowledge that my medical condition has been evaluated and explained to me by the emergency department physician or other qualified medical person and/or my attending physician who has recommended and offered to me further medical examination and treatment. I understand the Hospital's obligation with respect to the treatment and stabilization of my emergency medical condition. I nevertheless request to be transferred. I release the Hospital, the doctor, and any other persons caring for me from all responsibility or liability for any injury or ill effects that may result from my transfer and agree to accept all responsibility for the consequences of my choice to transfer, rather than receive stabilizing treatment at the Hospital. I understand that because the transfer is my request, my insurance may not provide reimbursement for the services. The Hospital will assist and direct me and my family in how to make arrangements for transfer, but the hospital is not liable for any fees charged by the transport service. In spite of this understanding, I refuse to consent to further medical examination and treatment which has been offered to me, and request transfer to [address] [place] [address] [address] Name, [facility] : [address] [place] [address],. I authorize the performance of emergency medical procedures and treatments upon me in both transit and upon arrival at the receiving facility.   Additionally, I authorize the release of any and all medical records to the receiving facility and request they be transported with me, if possible. I authorize the performance of emergency medical procedures and treatments upon me in both transit and upon arrival at the receiving facility. Additionally, I authorize the release of any and all medical records to the receiving facility and request they be transported with me, if possible. I understand that the safest mode of transportation during a medical emergency is an ambulance and that the Hospital advocates the use of this mode of transport. Risks of traveling to the receiving facility by car, including absence of medical control, life sustaining equipment, such as oxygen, and medical personnel has been explained to me and I fully understand them. (REYNALDO CORRECT BOX BELOW)  [  ]  I consent to the stated transfer and to be transported by ambulance/helicopter. [  ]  I consent to the stated transfer, but refuse transportation by ambulance and accept full responsibility for my transportation by car. I understand the risks of non-ambulance transfers and I exonerate the Hospital and its staff from any deterioration in my condition that results from this refusal.    X___________________________________________    DATE  23  TIME________  Signature of patient or legally responsible individual signing on patient behalf           RELATIONSHIP TO PATIENT_________________________          Provider Certification    NAME Mejia Norman                                        [facility] 1955                              MRN [id number]    A medical screening exam was performed on the above named patient. Based on the examination:    Condition Necessitating Transfer The primary encounter diagnosis was Sepsis ([address]). Diagnoses of Diarrhea, PHANI (acute kidney injury) ([address]), Leukocytosis, Renal failure, Hypokalemia, Hypomagnesemia, Hydronephrosis, Kidney stone, and Enterocolitis were also pertinent to this visit.     Patient Condition: The patient has been stabilized such that within reasonable medical probability, no material deterioration of the patient condition or the condition of the unborn child(elisabeth) is likely to result from the transfer    Reason for Transfer:      Transfer Requirements: [address]   · Space available and qualified personnel available for treatment as acknowledged by Pacs  · Agreed to accept transfer and to provide appropriate medical treatment as acknowledged by       Vicente Valverde  · Appropriate medical records of the examination and treatment of the patient are provided at the time of transfer   [address] _______  · Transfer will be performed by qualified personnel from    and appropriate transfer equipment as required, including the use of necessary and appropriate life support measures. Provider Certification: I have examined the patient and explained the following risks and benefits of being transferred/refusing transfer to the patient/family:  General risk, such as traffic hazards, adverse weather conditions, rough terrain or turbulence, possible failure of equipment (including vehicle or aircraft), or consequences of actions of persons outside the control of the transport personnel, Unanticipated needs of medical equipment and personnel during transport, Risk of worsening condition, The possibility of a transport vehicle being unavailable      Based on these reasonable risks and benefits to the patient and/or the unborn child(elisabeth), and based upon the information available at the time of the patient’s examination, I certify that the medical benefits reasonably to be expected from the provision of appropriate medical treatments at another medical facility outweigh the increasing risks, if any, to the individual’s medical condition, and in the case of labor to the unborn child, from effecting the transfer.     X____________________________________________ DATE 08/27/23        TIME_______      ORIGINAL - SEND TO MEDICAL RECORDS   COPY - SEND WITH PATIENT DURING TRANSFER

## 2023-08-27 NOTE — PLAN OF CARE
Problem: MOBILITY - ADULT  Goal: Maintain or return to baseline ADL function  Description: INTERVENTIONS:  -  Assess patient's ability to carry out ADLs; assess patient's baseline for ADL function and identify physical deficits which impact ability to perform ADLs (bathing, care of mouth/teeth, toileting, grooming, dressing, etc.)  - Assess/evaluate cause of self-care deficits   - Assess range of motion  - Assess patient's mobility; develop plan if impaired  - Assess patient's need for assistive devices and provide as appropriate  - Encourage maximum independence but intervene and supervise when necessary  - Involve family in performance of ADLs  - Assess for home care needs following discharge   - Consider OT consult to assist with ADL evaluation and planning for discharge  - Provide patient education as appropriate  Outcome: Not Progressing  Goal: Maintains/Returns to pre admission functional level  Description: INTERVENTIONS:  - Perform BMAT or MOVE assessment daily.   - Set and communicate daily mobility goal to care team and patient/family/caregiver.    - Collaborate with rehabilitation services on mobility goals if consulted  - Out of bed for toileting  - Record patient progress and toleration of activity level   Outcome: Not Progressing     Problem: Prexisting or High Potential for Compromised Skin Integrity  Goal: Skin integrity is maintained or improved  Description: INTERVENTIONS:  - Identify patients at risk for skin breakdown  - Assess and monitor skin integrity  - Assess and monitor nutrition and hydration status  - Monitor labs   - Assess for incontinence   - Turn and reposition patient  - Assist with mobility/ambulation  - Relieve pressure over bony prominences  - Avoid friction and shearing  - Provide appropriate hygiene as needed including keeping skin clean and dry  - Evaluate need for skin moisturizer/barrier cream  - Collaborate with interdisciplinary team   - Patient/family teaching  - Consider wound care consult   Outcome: Progressing     Problem: PAIN - ADULT  Goal: Verbalizes/displays adequate comfort level or baseline comfort level  Description: Interventions:  - Encourage patient to monitor pain and request assistance  - Assess pain using appropriate pain scale  - Administer analgesics based on type and severity of pain and evaluate response  - Implement non-pharmacological measures as appropriate and evaluate response  - Consider cultural and social influences on pain and pain management  - Notify physician/advanced practitioner if interventions unsuccessful or patient reports new pain  Outcome: Progressing     Problem: INFECTION - ADULT  Goal: Absence or prevention of progression during hospitalization  Description: INTERVENTIONS:  - Assess and monitor for signs and symptoms of infection  - Monitor lab/diagnostic results  - Monitor all insertion sites, i.e. indwelling lines, tubes, and drains  - Monitor endotracheal if appropriate and nasal secretions for changes in amount and color  - [place] appropriate cooling/warming therapies per order  - Administer medications as ordered  - Instruct and encourage patient and family to use good hand hygiene technique  - Identify and instruct in appropriate isolation precautions for identified infection/condition  Outcome: Not Progressing  Goal: Absence of fever/infection during neutropenic period  Description: INTERVENTIONS:  - Monitor WBC    Outcome: Not Progressing     Problem: SAFETY ADULT  Goal: Maintain or return to baseline ADL function  Description: INTERVENTIONS:  -  Assess patient's ability to carry out ADLs; assess patient's baseline for ADL function and identify physical deficits which impact ability to perform ADLs (bathing, care of mouth/teeth, toileting, grooming, dressing, etc.)  - Assess/evaluate cause of self-care deficits   - Assess range of motion  - Assess patient's mobility; develop plan if impaired  - Assess patient's need for assistive devices and provide as appropriate  - Encourage maximum independence but intervene and supervise when necessary  - Involve family in performance of ADLs  - Assess for home care needs following discharge   - Consider OT consult to assist with ADL evaluation and planning for discharge  - Provide patient education as appropriate  Outcome: Not Progressing  Goal: Maintains/Returns to pre admission functional level  Description: INTERVENTIONS:  - Perform BMAT or MOVE assessment daily.   - Set and communicate daily mobility goal to care team and patient/family/caregiver.    - Collaborate with rehabilitation services on mobility goals if consulted  - Out of bed for toileting  - Record patient progress and toleration of activity level   Outcome: Not Progressing     Problem: GENITOURINARY - ADULT  Goal: Maintains or returns to baseline urinary function  Description: INTERVENTIONS:  - Assess urinary function  - Encourage oral fluids to ensure adequate hydration if ordered  - Administer IV fluids as ordered to ensure adequate hydration  - Administer ordered medications as needed  - Offer frequent toileting  - Follow urinary retention protocol if ordered  Outcome: Progressing  Goal: Absence of urinary retention  Description: INTERVENTIONS:  - Assess patient’s ability to void and empty bladder  - Monitor I/O  - Bladder scan as needed  - Discuss with physician/AP medications to alleviate retention as needed  - Discuss catheterization for long term situations as appropriate  Outcome: Progressing  Goal: Urinary catheter remains patent  Description: INTERVENTIONS:  - Assess patency of urinary catheter  - If patient has a chronic giles, consider changing catheter if non-functioning  - Follow guidelines for intermittent irrigation of non-functioning urinary catheter  Outcome: Progressing

## 2023-08-27 NOTE — ASSESSMENT & PLAN NOTE
Lab Results   Component Value Date    EGFR 12 08/27/2023    EGFR 61 05/17/2023    EGFR 33 05/10/2022    CREATININE 4.35 (H) 08/27/2023    CREATININE 1.19 05/17/2023    CREATININE 1.99 (H) 05/10/2022   · Monitor kidney indices  · Avoid nephrotoxic drugs  · Monitor input and output  · Consult nephrology

## 2023-08-27 NOTE — CONSULTS
CONSULT    Patient Name: Adolfo Morales  Patient MRN: [id number]  Date of Service: 8/27/2023   Date / Time Note Created: 8/27/2023 4:24 PM   Referring Provider: Tripp Gong MD    Provider Creating Note: QUIANA Tomas    PCP: Tomas Mo  Attending Provider:  Tripp Gong MD    Reason for Consult: Flank Pain    HPI:  Adolfo Morales is a 56-year-old male known to Dr. Lu Key for history of BPH, status post UroLift procedure 10/20/2021, with refractory urinary retention managed with home clean intermittent catheterization for approximately 1 year. Patient presented to [facility] emergency room with a chief complaint of weakness, urinary retention, and intractable diarrhea, not accompanied by fever, chills or gross hematuria. His lab work was remarkable for severe acute kidney injury and leukocytosis with serum creatinine of 4.35 and WBCs exceeding 31,000 on initial evaluation. COVID panel was negative. Lactic acid 10. Urine analysis was nitrite positive. He had substantial pyuria on microscopic evaluation but only occasional bacteria. His lab work prompted urgent CT which demonstrated marked left hydronephrosis secondary to large 1.7 x 1.1 x 0.8 cm left proximal stone. There were additional nonobstructing smaller intrarenal calculi bilaterally. There was marked fluid distention of numerous loops of small bowel and liquid stool throughout the colon suggesting enterocolitis. Given patient's parameters, emergent IR consultation was requested. Patient was transferred to [facility] for expedited procedure and is admitted to the intensive care unit.          Active Problems:    Patient Active Problem List   Diagnosis   • Acute renal failure with tubular necrosis (HCC)   • Stage 2 chronic kidney disease   • Hydronephrosis   • Persistent proteinuria   • Umbilical hernia without obstruction and without gangrene   • Incarcerated right inguinal hernia   • Depression   • Dental crowns present   • ADD (attention deficit disorder)   • Hydronephrosis concurrent with and due to calculi of kidney and ureter   • Diarrhea of presumed infectious origin   • Sepsis ([address])            Impressions  · History of BPH, status post UroLift with refractory urinary retention--previous urodynamic study demonstrated small noncompliant bladder. Patient was instructed to continue clean intermittent catheterization at home and is now CIC dependent for 1 year. · Nephrolithiasis with large left proximal ureteral calculus measuring 1.7 x 1.1 x 0.8 cm with resultant obstruction/hydronephrosis. · Sepsis--multiple sources including  secondary to obstructing nephrolithiasis as well as enterocolitis with subsequent elevated lactic acid and impressive leukocytosis. · PHAIN-- mixed etiology secondary to  tract obstruction as well as prerenal given recent diarrheal induced fluid volume losses. · History of bladder calculi--nonobstructing. Sequelae of longstanding urinary stasis. Recommendations  1. Continue medical optimization, symptom management and empiric antibiosis. 2. Appreciate IR expedient response--patient now status post left PCN insertion for emergent renal unit decompression. 3. Trend labs. 4. Maintain left PCN to straight drainage. Flush per IR protocol. 5. Maintain Ordonez catheter to straight drainage for hemodynamic monitoring. 6. Ordonez can be removed prior to discharge and patient can resume CIC. 7. Long-term--patient will follow-up with Dr. Abi Condon to discuss definitive stone treatment ureteroscopy/laser lithotripsy with or without cystolitholopaxy. 8. We will follow clinical progress tomorrow.       Past Medical History:   Diagnosis Date   • ADD (attention deficit disorder)    • Benign prostatic hyperplasia    • Chronic kidney disease    • Dental crowns present    • Depression    • Exercises 3 to 4 times per week    • History of 2019 novel coronavirus disease (COVID-19) 01/2021 recovered at home/'pretty mild symptoms like the flu"   • Indwelling Ordonez catheter present    • Renal disorder    • Retention of urine    • Urinary retention    • Wears glasses     reading       Past Surgical History:   Procedure Laterality Date   • FRACTURE SURGERY Right     R. Hip Sx; screw implanted   • IR NEPHROSTOMY TUBE PLACEMENT  8/27/2023   • VA CYSTO INSERTION TRANSPROSTATIC IMPLANT SINGLE N/A 10/20/2021    Procedure: CYSTOSCOPY WITH INSERTION UROLIFT;  Surgeon: Evelia Keenan MD;  Location: [facility] OR;  Service: Urology   • VA RPR 1ST INGUN HRNA AGE 5 YRS/> REDUCIBLE Right 4/27/2021    Procedure: OPEN INGUINAL HERNIA REPAIR WITH MESH;  Surgeon: Roseanne Valle MD;  Location: [facility] MAIN OR;  Service: General   • VA RPR UMBILICAL HRNA 5 YRS/> REDUCIBLE N/A 4/27/2021    Procedure: OPEN UMBILICAL HERNIA REPAIR WITH MESH;  Surgeon: Roseanne Valle MD;  Location: [facility] MAIN OR;  Service: General   • TONSILLECTOMY     • WISDOM TOOTH EXTRACTION         No family history on file.     Social History     Socioeconomic History   • Marital status: Single     Spouse name: Not on file   • Number of children: Not on file   • Years of education: Not on file   • Highest education level: Not on file   Occupational History   • Not on file   Tobacco Use   • Smoking status: Former   • Smokeless tobacco: Never   Vaping Use   • Vaping Use: Never used   Substance and Sexual Activity   • Alcohol use: Never   • Drug use: Never   • Sexual activity: Not on file     Comment: defer   Other Topics Concern   • Not on file   Social History Narrative   • Not on file     Social Determinants of Health     Financial Resource Strain: Not on file   Food Insecurity: Not on file   Transportation Needs: Not on file   Physical Activity: Not on file   Stress: Not on file   Social Connections: Not on file   Intimate Partner Violence: Not on file   Housing Stability: Not on file       No Known Allergies    Review of Systems  10 point review of systems negative except as noted in HPI  Constitutional:   positive for  - fatigue and malaise  Cardiovascular:   no chest pain or dyspnea on exertion  Gastrointestinal:   positive for - abdominal pain, appetite loss, diarrhea and nausea/vomiting  Genito-Urinary:   no dysuria, trouble voiding, or hematuria--chronic intermittent catheterization  Neurological:   no TIA or stroke symptoms     Chart Review   Allergies, current medications, history, problem list    Vital Signs  BP 93/62   Pulse 100   Temp 97.6 °F (36.4 °C) (Axillary)   Resp (!) 24   Ht 5' 8" (1.727 m)   Wt 77.3 kg (170 lb 6.7 oz)   SpO2 97%   BMI 25.91 kg/m²     Physical Exam  General appearance: alert and oriented, in no acute distress, appears stated age, cooperative and no distress  Head: Normocephalic, without obvious abnormality, atraumatic  Neck: no JVD and supple, symmetrical, trachea midline  Lungs: diminished breath sounds  Heart: regular rate and rhythm, S1, S2 normal, no murmur, click, rub or gallop  Abdomen: abnormal findings:  mild tenderness in the lower abdomen  Extremities: extremities normal, warm and well-perfused; no cyanosis, clubbing, or edema  Pulses: 2+ and symmetric  Neurologic: Grossly normal  Left PCN--mildly blush. But clear. Ordonez--ailyn urine     Laboratory Studies  Lab Results   Component Value Date    K 3.0 (L) 08/27/2023     08/27/2023    CO2 26 08/27/2023    CREATININE 3.44 (H) 08/27/2023    BUN 40 (H) 08/27/2023    MG 2.1 08/27/2023    PHOS 3.3 08/27/2023     Lab Results   Component Value Date    WBC 26.41 (H) 08/27/2023    RBC 4.37 08/27/2023    HGB 12.7 08/27/2023    HCT 38.5 08/27/2023    MCV 88 08/27/2023    MCH 29.1 08/27/2023    RDW 13.7 08/27/2023     (L) 08/27/2023         Imaging and Other Studies  ). IR nephrostomy tube placement    Result Date: 8/27/2023  Narrative: Antegrade pyelogram and left nephrostomy tube placement Clinical History: Marked left hydronephrosis due to a proximal ureteral large obstructing calculus with urosepsis and Levophed Contrast:  25 mL of iohexol (OMNIPAQUE) Fluoro time: 2.5 mins fluoro Radiation dose:  55 mGy Number of Images: 7 Conscious sedation time:  anesthesia The patient was placed prone on the table and the left flank was prepped and draped in the usual sterile fashion. After local anesthesia was administered to the skin, a 21 gauge Echotip needle was advanced under direct ultrasound guidance into a posterior lower pole calyx. A 0.018 wire was advanced through the needle, and the needle was removed. The Accustick coaxial dilator was inserted over the wire, and the internal portions including the wire were removed. Contrast was injected through the outer dilator, an antegrade pyelogram was performed. Findings: Severe left hydronephrosis with a partially obstructed proximal ureter. Intervention: The outer dilator was removed over a heavy-duty wire. After tract dilatation, a 10 Mauritian nephrostomy tube was advanced over the wire until the loop was formed within the renal pelvis. The catheter was then locked in place. The catheter was  sutured at the skin. Contrast was injected, confirming satisfactory location of the tube. The tube was then connected to gravity bag, and dressed sterilely. Impression: Impression: Successful ultrasound and fluoroscopically guided placement of a 10 Mauritian percutaneous left nephrostomy tube due to marked hydronephrosis from a proximal ureteral partially obstructing calculus. PLAN: Tube to bag drainage. Flush with 10 cc normal saline solution every 8 hours in the hospital and q. day at home. Return in 2 months for a tube check unless the patient has gone to surgery by that time. Patient to follow-up with Dr. Lauren Cross. Workstation performed: [id number]     XR chest 1 view portable    Result Date: 8/27/2023  Narrative: CHEST INDICATION:   Status post central line. COMPARISON: CXR 2/27/2013, abdomen CT 8/27/2023.  EXAM PERFORMED/VIEWS:  XR CHEST PORTABLE. FINDINGS: Right jugular catheter in upper SVC. Cardiomediastinal silhouette normal. Lungs clear. No effusion or pneumothorax. Nodules projecting over both lung bases are nipples. Upper abdomen normal. Healed left rib fractures. Impression: No acute cardiopulmonary disease. Right jugular catheter in upper SVC with no pneumothorax. Workstation performed: [id number]     CT abdomen pelvis wo contrast    Result Date: 8/27/2023  Narrative: CT ABDOMEN AND PELVIS WITHOUT IV CONTRAST INDICATION:   Abdominal pain, acute, nonlocalized abd pain. COMPARISON:  None. TECHNIQUE:  CT examination of the abdomen and pelvis was performed without intravenous contrast. Multiplanar 2D reformatted images were created from the source data. This examination, like all CT scans performed in the [facility], was performed utilizing techniques to minimize radiation dose exposure, including the use of iterative reconstruction and automated exposure control. Radiation dose length product (DLP) for this visit:  646.46 mGy-cm Enteric contrast was administered. FINDINGS: ABDOMEN LOWER CHEST:  Atelectatic changes are noted at the lung bases. LIVER/BILIARY TREE:  Unremarkable. GALLBLADDER:  No calcified gallstones. No pericholecystic inflammatory change. SPLEEN:  Unremarkable. PANCREAS: Scattered pancreatic parenchymal calcifications consistent with history of chronic pancreatitis. ADRENAL GLANDS:  There is low density lobulated thickening of adrenal glands bilaterally statistically most likely to represent adenomatous hyperplasia. KIDNEYS/URETERS: Marked left hydronephrosis secondary to a 1.7 x 1.1 x 0.8 cm stone at the proximal left ureter at the level of the L3-L4 disc space. Additional group of stones at the left inferior pole measuring up to 5 mm. Tiny 2 mm stone at the right inferior pole.  STOMACH AND BOWEL: Marked fluid distention of numerous loops of small bowel without discrete transition point identified. Liquid stool throughout the colon. . APPENDIX:  No findings to suggest appendicitis. ABDOMINOPELVIC CAVITY:  No ascites. No pneumoperitoneum. No lymphadenopathy. VESSELS:  Unremarkable for patient's age. PELVIS REPRODUCTIVE ORGANS:  Unremarkable for patient's age. URINARY BLADDER: Numerous bladder calculi ABDOMINAL WALL/INGUINAL REGIONS:  Unremarkable. OSSEOUS STRUCTURES:  No acute fracture or destructive osseous lesion. Status post ORIF of right proximal femur. Impression: Marked left hydronephrosis secondary to a 1.7 x 1.1 x 0.8 cm stone at the proximal left ureter at the level of the L3-L4 disc space. Findings consistent with enterocolitis.  Workstation performed: [id number]       Medications   Current Facility-Administered Medications   Medication Dose Route Frequency Provider Last Rate   • amphetamine-dextroamphetamine  20 mg Oral BID (AM & Afternoon) QUIANA Diez     • chlorhexidine  15 mL Mouth/Throat Q12H [address] QUIANA Diez     • fentanyl citrate (PF)  25 mcg Intravenous Q10 Min PRN Kena Hammer DO     • heparin (porcine)  5,000 Units Subcutaneous [facility] HUMPHREY DiezNP     • HYDROcodone-acetaminophen  1 tablet Oral Q6H PRN HUMPHREY DiezNP     • norepinephrine  1-30 mcg/min Intravenous Titrated HUMPHREY DiezNP Stopped (08/27/23 1403)   • ondansetron  4 mg Intravenous Q6H PRN Kena Hammer DO     • piperacillin-tazobactam  3.375 g Intravenous Q12H Charli Atkinson MD     • piperacillin-tazobactam  3.375 g Intravenous Once QUIANA Diez Stopped (08/27/23 1139)     Facility-Administered Medications Ordered in Other Encounters   Medication Dose Route Frequency Provider Last Rate   • oxyCODONE-acetaminophen  1 tablet Oral Once DO Guy Garcia [place]

## 2023-08-27 NOTE — ASSESSMENT & PLAN NOTE
· Transferred to [facility] 2/2 hydronephrosis due to a calculus in the left ureter  · Patient will go to the interventional radiology suite for nephrostomy tube to be placed  · Patient is septic with hypotension and lactic acidosis  · Continue IV fluids  · Continue pressors as needed

## 2023-08-27 NOTE — ED PROVIDER NOTES
History  Chief Complaint   Patient presents with   • Medical Problem     Uses strait cath to urinate. Last two days not working     HPI is a 78-year-old male who presents to the emergency department with concerns of diarrhea. Patient ensorses that he started with new onset diarrhea approximately 6 hours ago and has been having nonstop fecal incontinence since then he also reports some associated nausea but no episodes of vomiting. States he feels fatigued and is having abdominal pain/cramping in relation to the diarrhea. Denies any new foods denies any recent travel denies any other sick contacts. He denies chest pain or shortness of breath. Prior to Admission Medications   Prescriptions Last Dose Informant Patient Reported? Taking? Cholecalciferol (VITAMIN D3 PO)  Self Yes No   Sig: Take by mouth   Patient not taking: No sig reported   GLUTATHIONE PO  Self Yes No   Sig: Take by mouth daily   Patient not taking: No sig reported   HYDROcodone-acetaminophen (NORCO) 5-325 mg per tablet  Self No No   Sig: Take 1 tablet by mouth every 6 (six) hours as needed for pain for up to 5 dosesMax Daily Amount: 4 tablets   Multiple Vitamin (multivitamin) tablet  Self Yes No   Sig: Take 1 tablet by mouth daily   Patient not taking: No sig reported   Multiple Vitamins-Minerals (ZINC PO)  Self Yes No   Sig: Take by mouth   Patient not taking: No sig reported   QUERCETIN PO  Self Yes No   Sig: Take by mouth daily   Patient not taking: No sig reported   amphetamine-dextroamphetamine (ADDERALL) 20 mg tablet  Self Yes No   Sig: Take 20 mg by mouth in the morning and 20 mg at noon.  2 tabs Am and 1 tab PM .   finasteride (PROSCAR) 5 mg tablet  Self No No   Sig: Take 1 tablet (5 mg total) by mouth daily for 90 doses   Patient taking differently: Take 5 mg by mouth every evening    oxyCODONE-acetaminophen (PERCOCET) 5-325 mg per tablet  Self No No   Sig: Take 1 tablet by mouth every 4 (four) hours as needed for moderate painMax Daily Amount: 6 tablets   Patient not taking: Reported on 6/14/2021   phenazopyridine (PYRIDIUM) 100 mg tablet  Self No No   Sig: Take 1 tablet (100 mg total) by mouth 3 (three) times a day as needed for bladder spasms   tamsulosin (FLOMAX) 0.4 mg   Yes No   Sig: Take 0.4 mg by mouth daily with dinner 2 tabs   Patient not taking: No sig reported      Facility-Administered Medications: None       Past Medical History:   Diagnosis Date   • ADD (attention deficit disorder)    • Benign prostatic hyperplasia    • Chronic kidney disease    • Dental crowns present    • Depression    • Exercises 3 to 4 times per week    • History of 2019 novel coronavirus disease (COVID-19) 01/2021    recovered at home/'pretty mild symptoms like the flu"   • Indwelling Ordonez catheter present    • Renal disorder    • Retention of urine    • Urinary retention    • Wears glasses     reading       Past Surgical History:   Procedure Laterality Date   • FRACTURE SURGERY Right     R. Hip Sx; screw implanted   • NC CYSTO INSERTION TRANSPROSTATIC IMPLANT SINGLE N/A 10/20/2021    Procedure: CYSTOSCOPY WITH INSERTION UROLIFT;  Surgeon: Evelia Keenan MD;  Location: [facility] OR;  Service: Urology   • NC RPR 1ST INGUN HRNA AGE 5 YRS/> REDUCIBLE Right 4/27/2021    Procedure: OPEN INGUINAL HERNIA REPAIR WITH MESH;  Surgeon: Roseanne Valle MD;  Location: [address] [place] [address] OR;  Service: General   • NC RPR UMBILICAL HRNA 5 YRS/> REDUCIBLE N/A 4/27/2021    Procedure: OPEN UMBILICAL HERNIA REPAIR WITH MESH;  Surgeon: Roseanne Valle MD;  Location: [address] [place] [address] OR;  Service: General   • TONSILLECTOMY     • WISDOM TOOTH EXTRACTION         History reviewed. No pertinent family history. I have reviewed and agree with the history as documented.     E-Cigarette/Vaping   • E-Cigarette Use Never User      E-Cigarette/Vaping Substances   • Nicotine No    • THC No    • CBD No    • Flavoring No    • Other No    • Unknown No      Social History     Tobacco Use   • Smoking status: Former   • Smokeless tobacco: Never   Vaping Use   • Vaping Use: Never used   Substance Use Topics   • Alcohol use: Never   • Drug use: Never       Review of Systems   Constitutional: Negative for activity change, appetite change, chills, fatigue and fever. HENT: Negative for congestion, dental problem, drooling, ear discharge, ear pain, facial swelling, postnasal drip, rhinorrhea and sinus pain. Eyes: Negative for photophobia, pain, discharge and itching. Respiratory: Negative for apnea, cough, chest tightness and shortness of breath. Cardiovascular: Negative for chest pain and leg swelling. Gastrointestinal: Positive for abdominal pain and diarrhea. Negative for abdominal distention, anal bleeding, constipation and nausea. Endocrine: Negative for cold intolerance, heat intolerance and polydipsia. Genitourinary: Negative for difficulty urinating. Musculoskeletal: Negative for arthralgias, gait problem, joint swelling and myalgias. Skin: Negative for color change and pallor. Allergic/Immunologic: Negative for immunocompromised state. Neurological: Negative for dizziness, seizures, facial asymmetry, weakness, light-headedness, numbness and headaches. Psychiatric/Behavioral: Negative for agitation, behavioral problems, confusion, decreased concentration and dysphoric mood. All other systems reviewed and are negative. Physical Exam  Physical Exam  Constitutional:       General: He is in acute distress. Appearance: He is well-developed. He is ill-appearing and toxic-appearing. HENT:      Head: Normocephalic. Right Ear: Tympanic membrane normal.      Left Ear: Tympanic membrane normal.      Nose: Nose normal.      Mouth/Throat:      Mouth: Mucous membranes are dry. Eyes:      Pupils: Pupils are equal, round, and reactive to light. Cardiovascular:      Rate and Rhythm: Regular rhythm. Tachycardia present.    Pulmonary:      Effort: Pulmonary effort is normal. Breath sounds: Normal breath sounds. Comments: Tachypnea  Abdominal:      General: Abdomen is flat. Bowel sounds are normal.      Palpations: Abdomen is soft. Tenderness: There is abdominal tenderness. There is no right CVA tenderness or left CVA tenderness. Comments: Hyperactive   Musculoskeletal:         General: No swelling or tenderness. Normal range of motion. Cervical back: Normal range of motion and neck supple. Skin:     General: Skin is warm. Capillary Refill: Capillary refill takes 2 to 3 seconds. Coloration: Skin is pale. Neurological:      General: No focal deficit present. Mental Status: He is alert.    Psychiatric:         Mood and Affect: Mood normal.         Vital Signs  ED Triage Vitals [08/27/23 0309]   Temperature Pulse Respirations Blood Pressure SpO2   (!) 97 °F (36.1 °C) (!) 110 18 107/59 94 %      Temp Source Heart Rate Source Patient Position - Orthostatic VS BP Location FiO2 (%)   Tympanic Monitor Lying Left arm --      Pain Score       9           Vitals:    08/27/23 0309 08/27/23 0335 08/27/23 0515   BP: 107/59 95/50 95/53   Pulse: (!) 110 (!) 106 (!) 110   Patient Position - Orthostatic VS: Lying           Visual Acuity      ED Medications  Medications   metroNIDAZOLE (FLAGYL) IVPB (premix) 500 mg 100 mL (0 mg Intravenous Stopped 8/27/23 0514)   magnesium sulfate 2 g/50 mL IVPB (premix) 2 g (2 g Intravenous New Bag 8/27/23 0632)   potassium chloride 20 mEq IVPB (premix) (20 mEq Intravenous New Bag 8/27/23 0620)   NOREPINEPHRINE 4 MG  ML NSS (CMPD ORDER) infusion (2 mcg/min Intravenous New Bag 8/27/23 0639)   multi-electrolyte (ISOLYTE-S PH 7.4) bolus 1,000 mL (has no administration in time range)   sodium chloride 0.9 % bolus 1,000 mL (0 mL Intravenous Stopped 8/27/23 0437)   sodium chloride 0.9 % bolus 1,000 mL (0 mL Intravenous Stopped 8/27/23 0515)   ampicillin-sulbactam (UNASYN) 3 g in sodium chloride 0.9 % 100 mL IVPB (0 g Intravenous Stopped 8/27/23 0551)   sodium chloride 0.9 % bolus 1,000 mL (1,000 mL Intravenous New Bag 8/27/23 0546)       Diagnostic Studies  Results Reviewed     Procedure Component Value Units Date/Time    Lactic acid 2 Hours [[id number]] Collected: 08/27/23 0641    Lab Status: In process Specimen: Blood from Line, Venous Updated: 08/27/23 [place] [[id number]] Collected: 08/27/23 0641    Lab Status: In process Specimen: Blood from Arm, Right Updated: 08/27/23 0647    Lactic acid, plasma (w/reflex if result > 2.0) [[id number]]  (Abnormal) Collected: 08/27/23 0413    Lab Status: Final result Specimen: Blood from Line, Venous Updated: 08/27/23 0439     LACTIC ACID 10.0 mmol/L     Narrative:      Result may be elevated if tourniquet was used during collection. UA w Reflex to Microscopic w Reflex to Culture [[id number]]     Lab Status: No result Specimen: Urine     Clostridium difficile toxin by PCR with EIA [[id number]] Collected: 08/27/23 0413    Lab Status: In process Specimen: Stool from Rectum Updated: 08/27/23 0421    Stool Enteric Bacterial Panel by PCR [[id number]] Collected: 08/27/23 0413    Lab Status: In process Specimen: Stool from Rectum Updated: 08/27/23 0420    Blood culture #2 [[id number]] Collected: 08/27/23 0413    Lab Status: In process Specimen: Blood from Arm, Left Updated: 08/27/23 0420    Blood culture #1 [[id number]] Collected: 08/27/23 0413    Lab Status: In process Specimen: Blood from Line, Venous Updated: 08/27/23 0419    FLU/RSV/COVID - if FLU/RSV clinically relevant [[id number]]  (Normal) Collected: 08/27/23 0328    Lab Status: Final result Specimen: Nares from Nose Updated: 08/27/23 0413     SARS-CoV-2 Negative     INFLUENZA A PCR Negative     INFLUENZA B PCR Negative     RSV PCR Negative    Narrative:      FOR PEDIATRIC PATIENTS - copy/paste COVID Guidelines URL to browser: [email or web address]    SARS-CoV-2 assay is a Nucleic Acid Amplification assay intended for the  qualitative detection of nucleic acid from SARS-CoV-2 in nasopharyngeal  swabs. Results are for the presumptive identification of SARS-CoV-2 RNA. Positive results are indicative of infection with SARS-CoV-2, the virus  causing COVID-19, but do not rule out bacterial infection or co-infection  with other viruses. Laboratories within the [facility] and its  territories are required to report all positive results to the appropriate  public health authorities. Negative results do not preclude SARS-CoV-2  infection and should not be used as the sole basis for treatment or other  patient management decisions. Negative results must be combined with  clinical observations, patient history, and epidemiological information. This test has not been FDA cleared or approved. This test has been authorized by FDA under an Emergency Use Authorization  (EUA). This test is only authorized for the duration of time the  declaration that circumstances exist justifying the authorization of the  emergency use of an in vitro diagnostic tests for detection of SARS-CoV-2  virus and/or diagnosis of COVID-19 infection under section 564(b)(1) of  the Act, 21 U. S.C. 394UWJ-3(M)(5), unless the authorization is terminated  or revoked sooner. The test has been validated but independent review by FDA  and CLIA is pending. Test performed using [employer] GeneXpert: This RT-PCR assay targets N2,  a region unique to SARS-CoV-2. A conserved region in the E-gene was chosen  for pan-Sarbecovirus detection which includes SARS-CoV-2. According to CMS-2020-01-R, this platform meets the definition of high-throughput technology.     RBC Morphology Reflex Test [[id number]] Collected: 08/27/23 0328    Lab Status: Final result Specimen: Blood from Arm, Right Updated: 08/27/23 0401    CBC and differential [[id number]]  (Abnormal) Collected: 08/27/23 0328    Lab Status: Final result Specimen: Blood from Arm, Right Updated: 08/27/23 0359     WBC 31.47 Thousand/uL      RBC 5.28 Million/uL      Hemoglobin 15.8 g/dL      Hematocrit 47.7 %      MCV 90 fL      MCH 29.9 pg      MCHC 33.1 g/dL      RDW 13.7 %      MPV 10.1 fL      Platelets 638 Thousands/uL     Manual Differential(PHLEBS Do Not Order) [[id number]]  (Abnormal) Collected: 08/27/23 0328    Lab Status: Final result Specimen: Blood from Arm, Right Updated: 08/27/23 0356     Segmented % 82 %      Bands % 9 %      Lymphocytes % 2 %      Monocytes % 7 %      Eosinophils, % 0 %      Basophils % 0 %      Absolute Neutrophils 28.64 Thousand/uL      Lymphocytes Absolute 0.63 Thousand/uL      Monocytes Absolute 2.20 Thousand/uL      Eosinophils Absolute 0.00 Thousand/uL      Basophils Absolute 0.00 Thousand/uL      Total Counted --     RBC Morphology Normal     Platelet Estimate Adequate     Giant PLTs Present    Magnesium [[id number]]  (Abnormal) Collected: 08/27/23 0328    Lab Status: Final result Specimen: Blood from Arm, Right Updated: 08/27/23 0355     Magnesium 1.6 mg/dL     Basic metabolic panel [[id number]]  (Abnormal) Collected: 08/27/23 0328    Lab Status: Final result Specimen: Blood from Arm, Right Updated: 08/27/23 0355     Sodium 141 mmol/L      Potassium 3.2 mmol/L      Chloride 96 mmol/L      CO2 24 mmol/L      ANION GAP 21 mmol/L      BUN 36 mg/dL      Creatinine 4.35 mg/dL      Glucose 139 mg/dL      Calcium 9.4 mg/dL      eGFR 12 ml/min/1.73sq m     Narrative:      [place] guidelines for Chronic Kidney Disease (CKD):   •  Stage 1 with normal or high GFR (GFR > 90 mL/min/1.73 square meters)  •  Stage 2 Mild CKD (GFR = 60-89 mL/min/1.73 square meters)  •  Stage 3A Moderate CKD (GFR = 45-59 mL/min/1.73 square meters)  •  Stage 3B Moderate CKD (GFR = 30-44 mL/min/1.73 square meters)  •  Stage 4 Severe CKD (GFR = 15-29 mL/min/1.73 square meters)  •  Stage 5 End Stage CKD (GFR <15 mL/min/1.73 square meters)  Note: GFR calculation is accurate only with a steady state creatinine                 CT abdomen pelvis wo contrast   Final Result by Oneta Schirmer, MD (08/27 7789)      Marked left hydronephrosis secondary to a 1.7 x 1.1 x 0.8 cm stone at the proximal left ureter at the level of the L3-L4 disc space. Findings consistent with enterocolitis. Workstation performed: [id number]         XR chest 1 view portable    (Results Pending)              Procedures  ECG 12 Lead Documentation Only    Date/Time: 8/27/2023 4:02 AM    Performed by: Claude Epp, MD  Authorized by: Claude Epp, MD    Indications / Diagnosis:  Tachycardia  ECG reviewed by me, the ED Provider: yes    Patient location:  ED  Previous ECG:     Previous ECG:  Compared to current    Comparison to cardiac monitor: Yes    Interpretation:     Interpretation: abnormal    Rate:     ECG rate:  108    ECG rate assessment: tachycardic    Rhythm:     Rhythm: sinus rhythm    ST segments:     ST segments:  Non-specific  T waves:     T waves: non-specific    Central Line    Date/Time: 8/27/2023 6:03 AM    Performed by: Claude Epp, MD  Authorized by: Claude Epp, MD    Patient location:  ED  Consent:     Consent obtained:  Emergent situation    Consent given by:  Patient    Risks discussed:  Arterial puncture, bleeding, infection, incorrect placement, nerve damage and pneumothorax    Alternatives discussed:  No treatment  Universal protocol:     Procedure explained and questions answered to patient or proxy's satisfaction: yes      Relevant documents present and verified: yes      Test results available and properly labeled: yes      Radiology Images displayed and confirmed.   If images not available, report reviewed: yes      Required blood products, implants, devices, and special equipment available: yes      Site/side marked: yes      Immediately prior to procedure, a time out was called: yes      Patient identity confirmed:  Verbally with patient  Pre-procedure details:     Hand hygiene: Hand hygiene performed prior to insertion      Sterile barrier technique: All elements of maximal sterile technique followed      Skin preparation:  2% chlorhexidine    Skin preparation agent: Skin preparation agent completely dried prior to procedure    Indications:     Central line indications: medications requiring central line    Anesthesia (see MAR for exact dosages): Anesthesia method:  Local infiltration    Local anesthetic:  Lidocaine 1% w/o epi  Procedure details:     Location:  Right internal jugular    Laterality:  Right    Approach: percutaneous technique used      Patient position:  Flat    Catheter type:  Triple lumen 16cm    Catheter size:  7 Fr    Landmarks identified: yes      Ultrasound guidance: yes      Sterile ultrasound techniques: Sterile gel and sterile probe covers were used      Number of attempts:  1    Successful placement: yes    Post-procedure details:     Post-procedure:  Dressing applied and line sutured    Assessment:  Blood return through all ports, free fluid flow, no pneumothorax on x-ray and placement verified by x-ray    Patient tolerance of procedure:   Tolerated well, no immediate complications  CriticalCare Time    Date/Time: 8/27/2023 6:13 AM    Performed by: Taryn Shelton MD  Authorized by: Taryn Shelton MD    Critical care provider statement:     Critical care time (minutes):  120    Critical care time was exclusive of:  Separately billable procedures and treating other patients and teaching time    Critical care was necessary to treat or prevent imminent or life-threatening deterioration of the following conditions:  Cardiac failure, sepsis, renal failure and shock    Critical care was time spent personally by me on the following activities:  Blood draw for specimens, obtaining history from patient or surrogate, development of treatment plan with patient or surrogate, discussions with primary provider, evaluation of patient's response to treatment, examination of patient, ordering and performing treatments and interventions, ordering and review of laboratory studies, ordering and review of radiographic studies, re-evaluation of patient's condition and review of old charts    I assumed direction of critical care for this patient from another provider in my specialty: no               ED Course  ED Course as of 08/27/23 0651   Sun Aug 27, 2023   0358 WBC(!!): 31.47   0358 Abs Neutrophils(!): 28.64   0358 Potassium(!): 3.2   0358 Magnesium(!): 1.6   0358 Creatinine(!): 4.35   0358 Significant lab abnormalities including leukocytosis and PHANI. Given history likely secondary to diarrhea/prerenal/dehydration. Elevated leukocytosis concerning for malignancy versus C. difficile versus other nonspecific intra-abdominal pathology. At this point we will proceed with sepsis work-up and CAT scan of abdomen pelvis. Patient will require admission. 3651 Continue with fluid boluses as patient appears dry   0416 Unable to proceed with CAT scan with dye secondary acute renal failure we will proceed with CT abdomen pelvis without dye   0436 CT reviewed by myself shows hydronephrosis, severe left kidney with what appears to be an impacted stone in the left ureter. Furthermore he has dilation as well as what I believed to megacolon of the small intestine. He has multiple, where I believed to be stones in the urinary bladder. I did call the reading room and requested a stat read on this given concerning features as well as sepsis. We will order antibiotics at this time. Initial consideration for Zosyn however patient in acute renal failure as such we will proceed with Unasyn and Flagyl for broad coverage. 0440 LACTIC ACID(!!): 10.0   0455 IMPRESSION:     Marked left hydronephrosis secondary to a 1.7 x 1.1 x 0.8 cm stone at the proximal left ureter at the level of the L3-L4 disc space.     Findings consistent with enterocolitis.    Donta Santa texted Dr. Emeka Blanton to discuss however anticipate he will recommend transfer. 3972 Patient now starting to become hypotensive last blood pressure reading 95 on 54. Concerns for vascular compromise. Patient may require pressors/central line. 3947 Reexamined resting uncomfortably and still appears ill. Blood pressure continues to trend down. Discussed work-up to date with patient including need for admission, ICU level care and possible surgical intervention as well as vasopressors and CVC placement. Patient is in agreement with all of the above including CVC placement if needed. All questions answered. 80 Dr. Natividad Cooks states he would like to attempt stent however unsure if patient is stable for procedure. Will discuss with ICU attending   [phone] ICU recommended replenishment of electrolytes. [address] to Dr. Filomena Lo of ICU recommended transfer to [facility] ICU on his service with stat IR intervention also requesting priority 1 transfer   1152 PACs notified. 9962 Chest x-ray reviewed by myself post CVC no obvious pneumothorax catheter appears to be at the IVC right atrium junction. [place] with PACS they are aware they will notify interventional radiology at the receiving facility. Further conversation with Dr. Filomena Lo will start low-dose norepinephrine given borderline maps   3954 Discussed case with interventional radiology report given they were made aware. In agreement with initiation of Levophed as well as continued resuscitation   3062 Per PACS aeromedical unavailable we will be sending a nurse ground transport unit within our   4944 Per PACS slets to pick patient up at 0 730                            Initial Sepsis Screening     [address] Name 08/27/23 8444                Is the patient's history suggestive of a new or worsening infection?  Yes (Proceed)  -DP        Suspected source of infection acute abdominal infection  -DP        Indicate SIRS criteria Tachycardia > 90 bpm;Tachypnea > 20 resp per min;Leukocytosis (WBC > 91780 IJL) OR Leukopenia (WBC <4000 IJL) OR Bandemia (WBC >10% bands)  -DP        Are two or more of the above signs & symptoms of infection both present and new to the patient? Yes (Proceed)  -DP        Assess for evidence of organ dysfunction: Are any of the below criteria present within 6 hours of suspected infection and SIRS criteria that are NOT considered to be chronic conditions? MAP < 65;Lactate >/equal 4. 0;Lactate > 2. 0;Creatinine > 2. 0;Creatinine > 2.0 AND > 0.5 above baseline;Urine output < 0.5 mL/kg/hour for 2 hours  -DP        Date of presentation of severe sepsis --        Time of presentation of severe sepsis --        Date of presentation of septic shock --        Time of presentation of septic shock --        Fluid Resuscitation: 30 ml/kg IV fluid bolus will be given based on actual body weight  -DP        Is the patient is persistently hypotensive in the hour after fluid bolus administration? If yes, patient meets criteria for vasopressor use. YES  -DP        Sepsis Note: Click "NEXT" below (NOT "close") to generate sepsis note based on above information. YES (proceed by clicking "NEXT")  -DP              User Key  (r) = Recorded By, (t) = Taken By, (c) = Cosigned By    [facility] Name Provider Type    DP Franchesca Conklin MD Physician                SBIRT 20yo+    Flowsheet Row Most Recent Value   Initial Alcohol Screen: US AUDIT-C     1. How often do you have a drink containing alcohol? 0 Filed at: 08/27/2023 0309   2. How many drinks containing alcohol do you have on a typical day you are drinking? 0 Filed at: 08/27/2023 0309   3a. Male UNDER 65: How often do you have five or more drinks on one occasion? 0 Filed at: 08/27/2023 0309   3b. FEMALE Any Age, or MALE 65+: How often do you have 4 or more drinks on one occassion? 0 Filed at: 08/27/2023 0309   Audit-C Score 0 Filed at: 08/27/2023 6773   IVANA: How many times in the past year have you. ..     Used an illegal drug or used a prescription medication for non-medical reasons? Never Filed at: 08/27/2023 0309                    Medical Decision Making  PHANI (acute kidney injury) [facility]): acute illness or injury  Diarrhea: acute illness or injury  Enterocolitis: acute illness or injury  Hydronephrosis: acute illness or injury  Hypomagnesemia: acute illness or injury  Kidney stone: acute illness or injury  Leukocytosis: acute illness or injury  Renal failure: acute illness or injury that poses a threat to life or bodily functions  Sepsis ([address]): acute illness or injury that poses a threat to life or bodily functions  Amount and/or Complexity of Data Reviewed  External Data Reviewed: labs, radiology, ECG and notes. Labs: ordered. Decision-making details documented in ED Course. Radiology: ordered. Risk  Prescription drug management.           Disposition  Final diagnoses:   Diarrhea   Sepsis ([address])   PHANI (acute kidney injury) ([address])   Leukocytosis   Renal failure   Hypokalemia   Hypomagnesemia   Hydronephrosis   Kidney stone   Enterocolitis   Septic shock ([address])     Time reflects when diagnosis was documented in both MDM as applicable and the Disposition within this note     Time User Action Codes Description Comment    8/27/2023  3:13 AM Aster Phenes Add [R33.9] Urinary retention     8/27/2023  4:00 AM Leobardo Umana Remove [R33.9] Urinary retention     8/27/2023  4:00 AM Aster Phenes Add [R19.7] Diarrhea     8/27/2023  4:00 AM Aster Phenes Add [A41.9] Sepsis ([address])     8/27/2023  4:00 AM Aster Phenes Add [N17.9] PHANI (acute kidney injury) ([address])     8/27/2023  4:00 AM Aster Phenes Modify [R19.7] Diarrhea     8/27/2023  4:00 AM Aster Phenes Modify [A41.9] Sepsis ([address])     8/27/2023  4:00 AM Aster Phenes Add [D72.829] Leukocytosis     8/27/2023  4:04 AM Aster Phenes Add [N19] Renal failure     8/27/2023  4:15 AM Aster Phenes Add [E87.6] Hypokalemia     8/27/2023  4:15 AM Aster Phenes Add [E83.42] Hypomagnesemia     8/27/2023  5:09 AM Lyndsay Maryellen Sextonair [N13.30] Hydronephrosis     8/27/2023  5:09 AM Lindaann Mixer Add [N20.0] Kidney stone     8/27/2023  5:09 AM Lindaann Mixer Add [K52.9] Enterocolitis     8/27/2023  6:28 AM Lindaann Mixer Add [A41.9,  R65.21] Septic shock ([address])     8/27/2023  6:28 AM Lindaann Mixer Modify [A41.9] Sepsis ([address])     8/27/2023  6:28 AM Lindaann Mixer Modify [A41.9,  R65.21] Septic shock [facility])       ED Disposition     ED Disposition   Transfer to Another Facility-In Network    Condition   --    Date/Time   Sun Aug 27, 2023  6:09 AM    Comment   Cornell Hurt should be transferred out to            MD Documentation    Flowsheet Row Most Recent Value   Patient Condition The patient has been stabilized such that within reasonable medical probability, no material deterioration of the patient condition or the condition of the unborn child(elisabeth) is likely to result from the transfer   Accepting Physician [address] Name, [place]    (Name & Tel number) Pacs   Sending  Geronimo Kennedy   Provider Certification General risk, such as traffic hazards, adverse weather conditions, rough terrain or turbulence, possible failure of equipment (including vehicle or aircraft), or consequences of actions of persons outside the control of the transport personnel, Unanticipated needs of medical equipment and personnel during transport, Risk of worsening condition, The possibility of a transport vehicle being unavailable      RN Documentation    Flowsheet Row Most [address] [facility] Name, [place]    (Name & Tel number) Pacs      Follow-up Information     Follow up With Specialties Details Why [address] for Urology [facility] Urology Call   [address]  [address] [address]  [facility] for Urology [facility], [address], [address], [place], [place], [address], [phone]    Yunier Meadows MD Internal Medicine Call   [address]  [phone]             Patient's Medications   Discharge Prescriptions    No medications on file       No discharge procedures on file.     PDMP Review       Value Time User    PDMP Reviewed  Yes 4/27/2021  9:18 PM Simon Jorgensen MD          ED Provider  Electronically Signed by           Baltazar Brothers MD  08/28/23 5805

## 2023-08-27 NOTE — ED NOTES
[facility] ICU RN to [facility] back for report as they are currently with a critical patient.          Michelle Ortega RN  08/27/23 0983

## 2023-08-27 NOTE — H&P
[address] [place] [address]  H&P  Name: Dm Banks 77 y.o. male I MRN: [id number]  Unit/Bed#: ICU 15 I Date of Admission: 8/27/2023   Date of Service: 8/27/2023 I Hospital Day: 0      Assessment/Plan   Sepsis [facility])  Assessment & Plan  · Admit to the intensive care unit  · Continue pressors as needed  · Fluid resuscitation  · Treat fevers as needed  · Secondary to renal calculus  · Continue IV antibiotics  · Continuous cardiopulmonary monitoring    Diarrhea of presumed infectious origin  Assessment & Plan  · Check C. difficile and stool viral panel  · IV fluids to rehydrate  · If C. difficile positive start p.o. vancomycin  · Continuous cardiopulmonary monitoring    Hydronephrosis concurrent with and due to calculi of kidney and ureter  Assessment & Plan  · Transferred to [facility] 2/2 hydronephrosis due to a calculus in the left ureter  · Patient will go to the interventional radiology suite for nephrostomy tube to be placed  · Patient is septic with hypotension and lactic acidosis  · Continue IV fluids  · Continue pressors as needed    ADD (attention deficit disorder)  Assessment & Plan  · Continue Adderall    Hydronephrosis  Assessment & Plan  · Patient going to interventional radiology for nephrostomy tube    Stage 2 chronic kidney disease  Assessment & Plan  Lab Results   Component Value Date    EGFR 12 08/27/2023    EGFR 61 05/17/2023    EGFR 33 05/10/2022    CREATININE 4.35 (H) 08/27/2023    CREATININE 1.19 05/17/2023    CREATININE 1.99 (H) 05/10/2022   · Monitor kidney indices  · Avoid nephrotoxic drugs  · Monitor input and output  · Consult nephrology    Acute renal failure with tubular necrosis (HCC)  Assessment & Plan  · Monitor kidney indices  · Avoid nephrotoxic medications  · Monitor input and output  · Monitor daily weight  · Nephrology consult       HPI:    Dm Banks is a 77 y.o. male with a known past medical history significant for chronic kidney disease, BPH, attention deficit disorder, depression, urine retention. Presented to [facility] with complaints of intractable diarrhea and urinary retention. CT scan there demonstrated Marked left hydronephrosis secondary to a 1.7 x 1.1 x 0.8 cm stone at the proximal left ureter at the level of the L3-L4 disc space.   Findings consistent with enterocolitis. Given that the patient was ill-appearing, septic, hypotensive a central line was placed and Levophed started. Additionally the patient was resuscitated with 3 L of fluid. He will receive another liter. [address] We will continue IV antibiotics. The patient will be transported to the interventional radiology suite for nephrostomy tube placement. At this time the patient feels awful. He is sick appearing. He appears dry and will receive more fluids. He subjectively reports feeling short of breath although his room air sats are in the mid 90s. He reports abdominal pain and intractable diarrhea. He denies headache, visual disturbances, fever, chills, nausea, vomiting. He denies lower extremity edema. Review of Systems:    Full 12 point review of systems was performed. Aside from what was mentioned in the HPI, it is otherwise negative. Historical Information   Past Medical History:   Diagnosis Date   • ADD (attention deficit disorder)    • Benign prostatic hyperplasia    • Chronic kidney disease    • Dental crowns present    • Depression    • Exercises 3 to 4 times per week    • History of 2019 novel coronavirus disease (COVID-19) 01/2021    recovered at home/'pretty mild symptoms like the flu"   • Indwelling Ordonez catheter present    • Renal disorder    • Retention of urine    • Urinary retention    • Wears glasses     reading     Past Surgical History:   Procedure Laterality Date   • FRACTURE SURGERY Right     R.  Hip Sx; screw implanted   • CO CYSTO INSERTION TRANSPROSTATIC IMPLANT SINGLE N/A 10/20/2021    Procedure: CYSTOSCOPY WITH INSERTION Danielle Cardona;  Surgeon: Janel Lowe MD;  Location: [facility] Main OR;  Service: Urology   • ME RPR 1ST INGUN HRNA AGE 5 YRS/> REDUCIBLE Right 4/27/2021    Procedure: OPEN INGUINAL HERNIA REPAIR WITH MESH;  Surgeon: Cherylene Province, MD;  Location: [facility] MAIN OR;  Service: General   • ME RPR UMBILICAL HRNA 5 YRS/> REDUCIBLE N/A 4/27/2021    Procedure: OPEN UMBILICAL HERNIA REPAIR WITH MESH;  Surgeon: Cherylene Province, MD;  Location: [facility] MAIN OR;  Service: General   • TONSILLECTOMY     • WISDOM TOOTH EXTRACTION       Social History   Social History     Substance and Sexual Activity   Alcohol Use Never     Social History     Substance and Sexual Activity   Drug Use Never     Social History     Tobacco Use   Smoking Status Former   Smokeless Tobacco Never       Family History:   No family history on file.     Medications:  Pertinent medications were reviewed  Current Facility-Administered Medications   Medication Dose Route Frequency Provider Last Rate   • amphetamine-dextroamphetamine  20 mg Oral BID (AM & Afternoon) Gwendel Nones, CRNP     • ampicillin  2,000 mg Intravenous Q4H Gwendel Nones, CRNP     • chlorhexidine  15 mL Mouth/Throat Q12H [address] Gwendel Nones, CRNP     • heparin (porcine)  5,000 Units Subcutaneous [facility] Gwendel Nones, CRNP     • HYDROcodone-acetaminophen  1 tablet Oral Q6H PRN Gwendel Nones, CRNP     • multi-electrolyte  1,000 mL Intravenous Once Gwendel Nones, CRNP     • norepinephrine  1-30 mcg/min Intravenous Titrated Gwendel Nones, CRNP 4 mcg/min (08/27/23 0914)     Facility-Administered Medications Ordered in Other Encounters   Medication Dose Route Frequency Provider Last Rate   • oxyCODONE-acetaminophen  1 tablet Oral Once Devi Diane, DO           No Known Allergies      Vitals:   BP 96/73 (BP Location: Right arm)   Pulse 94   Temp 97.8 °F (36.6 °C) (Axillary)   Resp (!) 23   Ht 5' 8" (1.727 m)   Wt 77.3 kg (170 lb 6.7 oz)   SpO2 96%   BMI 25.91 kg/m²   Body mass index is 25.91 kg/m². SpO2: 96 %,   SpO2 Activity: At Rest,   O2 Device: Nasal cannula    No intake or output data in the 24 hours ending 08/27/23 0944  Invasive Devices     Central Venous Catheter Line  Duration           CVC Central Lines 08/27/23 Triple Right Internal jugular <1 day          Peripheral Intravenous Line  Duration           Peripheral IV 08/27/23 Left;Ventral (anterior) Hand <1 day    Peripheral IV 08/27/23 Right Antecubital <1 day          Drain  Duration           Urethral Catheter Coude 16 Fr. <1 day                Physical Exam:  Gen: Pleasant and cooperative. Ill-appearing  HEENT: Atraumatic normocephalic pupils equal round reactive to light extraocular movements intact sclera anicteric oral mucosa is pink and dry  Neck: Supple no JVD no lymphadenopathy trachea midline  Chest: Clear to auscultation bilateral lung fields. Respirations are even and nonlabored. Oxygen saturations are in the mid 90s on room air. Cor: Regular rate and rhythm no murmurs rubs or gallops appreciated  Abd: Tender. With positive bowel sounds. Ext: No clubbing cyanosis or edema. Neuro: Alert and oriented x3 moves all extremities  Skin: Warm dry and intact no rash      Diagnostic Data:  Lab: I have personally reviewed pertinent lab results. ,   CBC:  Results from last 7 days   Lab Units 08/27/23  0924 08/27/23  0328   WBC Thousand/uL  --  31.47*   HEMOGLOBIN g/dL  --  15.8   HEMATOCRIT %  --  47.7   PLATELETS Thousands/uL 135* 176      CMP:   Lab Results   Component Value Date    SODIUM 141 08/27/2023    K 3.2 (L) 08/27/2023    CL 96 08/27/2023    CO2 24 08/27/2023    BUN 36 (H) 08/27/2023    CREATININE 4.35 (H) 08/27/2023    CALCIUM 9.4 08/27/2023    EGFR 12 08/27/2023   ,   PT/INR:   Lab Results   Component Value Date    INR 1.77 (H) 08/27/2023   ,   Magnesium: No components found for: "MAG",  Phosphorous: No results found for: "PHOS"    ABG: No results found for: "PHART", "AFA0IWV", "PO2ART", "UAF6FGZ", "O0MVGKVH", "BEART", "SOURCE",     Microbiology:  Urine culture, C. difficile, blood cultures, stool bacterial panel, pending. COVID, flu, RSV all negative. Imaging: I have personally reviewed the pertinent imaging studies on the PACS system  Marked left hydronephrosis secondary to a 1.7 x 1.1 x 0.8 cm stone at the proximal left ureter at the level of the L3-L4 disc space.     Findings consistent with enterocolitis    Chest x-ray: Demonstrates no acute cardiopulmonary disease    Cardiac/EKG/telemetry/Echo:       Normal sinus rhythm      VTE Prophylaxis: Sequential compression device (Venodyne)  and Heparin    Code Status: Level 1 - Full Code    QUIANA Donato    Portions of the record may have been created with voice recognition software. Occasional wrong word or "sound a like" substitutions may have occurred due to the inherent limitations of voice recognition software. Read the chart carefully and recognize, using context, where substitutions have occurred.

## 2023-08-27 NOTE — ANESTHESIA POSTPROCEDURE EVALUATION
Post-Op Assessment Note    CV Status:  Stable  Pain Score: 0    Pain management: adequate     Mental Status:  Alert and awake   Hydration Status:  Euvolemic   PONV Controlled:  Controlled   Airway Patency:  Patent      Post Op Vitals Reviewed: Yes      Staff: Anesthesiologist         No notable events documented.     BP      Temp      Pulse     Resp      SpO2      BP 98/66   Pulse 100   Temp 97.8 °F (36.6 °C) (Axillary)   Resp 22   Ht 5' 8" (1.727 m)   Wt 77.3 kg (170 lb 6.7 oz)   SpO2 98%   BMI 25.91 kg/m²

## 2023-08-27 NOTE — ASSESSMENT & PLAN NOTE
· Monitor kidney indices  · Avoid nephrotoxic medications  · Monitor input and output  · Monitor daily weight  · Nephrology consult

## 2023-08-27 NOTE — ASSESSMENT & PLAN NOTE
· Check C. difficile and stool viral panel  · IV fluids to rehydrate  · If C. difficile positive start p.o. vancomycin  · Continuous cardiopulmonary monitoring

## 2023-08-27 NOTE — DISCHARGE INSTRUCTIONS
Nephrostomy Tube Care     WHAT YOU NEED TO KNOW:   A nephrostomy tube is a catheter (thin plastic tube) that is inserted through your skin and into your kidney. The nephrostomy tube drains urine from your kidney into a collecting bag outside your body. You may need a nephrostomy tube when something is blocking the normal flow of urine. A nephrostomy tube may be used for a short or a long period of time. The nephrostomy tube comes out of your back, so you will need someone to help care for your nephrostomy tube. DISCHARGE INSTRUCTIONS:      How to clean the skin around the nephrostomy tube and change the bandage:  Since the nephrostomy tube comes out of your back, you will not be able to care for it by yourself. Ask someone to follow the general directions below to check and care for your nephrostomy tube. Gather the items you will need. Disposable (single use) under-pad, and a clean washcloth  Plain soap, warm water, and new medical gloves  Sterile gauze bandages  Clear adhesive dressing or medical tape  Skin barrier  Protective skin film  Trash bag  Remove the old bandage, and check the tube entry site. Have the patient lie on his side with the nephrostomy tube entry site facing up. Place the under-pad where it will catch drainage as you are working with the nephrostomy tube. Wash your hands with soap and water. Put on new medical gloves. Gently remove the old bandage, without pulling on the tube. Do this by holding the skin beside the tube with one hand. With the other hand, gently remove sticky tape and the skin barrier by pulling in the same direction as hair growth. Do not touch the side of the bandage that is placed over or around the tube. Throw the bandage and skin barrier away in a trash bag. Look for signs of infection, such as skin redness and swelling. Report any skin changes to healthcare providers. Clean the tube entry site.     Hold the tube in place to keep it from being pulled out while you are cleaning around it. You will need to clean the area twice. For the first cleaning, wet a new gauze bandage with soap and water. Begin at the entry site of the tube. Wipe the skin in circles, moving away from the entry site. Remove blood and any other material with the gauze. Do this as often as needed. Use a new gauze bandage each time you clean the area, moving away from the entry site. For the second cleaning, wet a new gauze bandage with water. Begin at the entry site of the tube. Wipe the skin in circles, moving away from the entry site. Use a new gauze bandage each time you clean the area, moving away from the entry site. Gently pat the skin with a clean washcloth to dry it. Apply the skin barrier and bandages. Roll up a bandage to make it thick, and place it under  the place where the tube enters the skin. Place it to support the tube, and stop it from kinking or bending. Tape the bandage in place, and apply more bandages if directed by a healthcare provider. Bring the tubing forward to the front and tape it to the skin. Do not stretch the tube tight, because this may pull the nephrostomy tube out. How often to change the bandage. Change the bandage around the tube, every other day. If your bandages  get dirty or wet, change them right away, and as often as needed. If your nephrostomy tube is to be used for a long period of time, the tube needs to be changed every 2 to 3 months. Healthcare providers will tell you when you need to make an appointment to have your tube changed. How to care for the urine drainage bag:   Ask if you need to measure and write down how much urine is in the bag before you empty it. Drain urine out of the drainage bag when it is ½ to ? full. Open the spout at the bottom of the bag to empty the urine into the toilet. You may need to detach the drainage bag from the nephrostomy tube to change it. . If so, attach a new drainage bag tightly to the nephrostomy tube. How to prevent problems with your nephrostomy tube:   Change bandages, directed. This helps to prevent infection. Throw away or clean your drainage bag as directed by your healthcare provider. Wipe the connecting ends of the drainage bag with alcohol before you reconnect the bag to the tube. This helps prevent infection. Keep the tube taped to your skin and connected to a drainage bag placed below the level of your kidneys. This helps prevent urine from backing up into your kidneys. You may wear a small drainage bag strapped to your leg to let you move around more easily. Check the catheter to be sure it is in place after you change your clothes or do other activities. Do not wear tight clothing over the tube. Place the tubing over your thigh rather than under it when you are sitting down. Be sure that nothing is pulling on the nephrostomy tube when you move around. Change positions if you see little or no urine in your drainage bag. Check to see if the urine tube is twisted or bent. Be sure that you are not sitting or lying on the tube. If there are no kinks and there is little or no urine in the drainage bag, tell your healthcare provider. Flush out the tube as directed. Some tubes get flushed one time a day with 10 mls of NSS You will be given a prescription for the flushes. To flush the nephrostomy tube, clean both connections with alcohol swap. Twist off the drainage bag tube and twist the saline syringe into the nephrostomy tube and flush briskly. Remove the syringe and twist the drainage bag tube back into the nephrostomy tube. Keep the site covered while you shower. Tape a piece of clear adhesive plastic over the dressing to keep it dry while you shower. Do not take tub baths.     Contact Interventional Radiology at [phone] [place] PATIENTS: Contact Interventional Radiology at [phone]) Kristie Delcid PATIENTS: Contact Interventional Radiology at [phone]) if:  The skin around the nephrostomy tube is red, swollen, itches, or has a rash. You have a fever greater than 101 or chills. You have lower back or hip pain. There are changes in how your urine looks or smells. You have little or no urine draining from the nephrostomy tube. You have nausea and are vomiting. The black bairon on your tube has moved, or the tube is longer than when it was put in. You have questions or concerns about your condition or care. The nephrostomy tube comes out completely. There is blood, pus, or a bad smell coming from the place where the tube enters your skin. Urine is leaking around the tube. The following pharmacies carry the flush syringes. Levine, Susan. \Hospital [facility] a New Name and Outlook.\"" H[facility] AND CL[facility]                     [facility]                    [address] [place] [address]  Phone [phone]            Phone [phone]  [address] [place] [address]. [address] [place] [address], [address] [place] [address]                                 [phone]  Phone [phone]            Phone [phone]    [facility]                                                                         [facility] [phone]  [address] [place] [address].   [facility]   Phone [phone]

## 2023-08-27 NOTE — ASSESSMENT & PLAN NOTE
· Admit to the intensive care unit  · Continue pressors as needed  · Fluid resuscitation  · Treat fevers as needed  · Secondary to renal calculus  · Continue IV antibiotics  · Continuous cardiopulmonary monitoring

## 2023-08-27 NOTE — ANESTHESIA PREPROCEDURE EVALUATION
Procedure:  IR NEPHROSTOMY TUBE PLACEMENT    Relevant Problems   ANESTHESIA (within normal limits)      /RENAL   (+) Acute renal failure with tubular necrosis (HCC)   (+) Hydronephrosis   (+) Hydronephrosis concurrent with and due to calculi of kidney and ureter   (+) Stage 2 chronic kidney disease      NEURO/PSYCH   (+) Depression      CAD/PCI/MI/CHF -- DENIES, > 4 METS W/O LIMITATION  COPD/ASTHMA/MAIK -- DENIES  PROBS WITH PRIOR ANESTHESIA -- DENIES  NPO STATUS CONFIRMED    Lab Results   Component Value Date    WBC 31.47 (HH) 08/27/2023    HGB 15.8 08/27/2023     (L) 08/27/2023     Lab Results   Component Value Date    SODIUM 141 08/27/2023    K 3.2 (L) 08/27/2023    BUN 36 (H) 08/27/2023    CREATININE 4.35 (H) 08/27/2023    EGFR 12 08/27/2023     No results found for: "PTT"   Lab Results   Component Value Date    INR 1.77 (H) 08/27/2023         No results found for: "HGBA1C"          Physical Exam    Airway    Mallampati score: II  TM Distance: >3 FB  Neck ROM: full     Dental   Comment: Molar cap,     Cardiovascular  Rhythm: regular, Rate: normal, Cardiovascular exam normal    Pulmonary  Pulmonary exam normal     Other Findings  LOOSE RIGHT UPPER TOOTH/FILLING (PATIENT UNCLEAR AS TO WHAT IS EXACTLY LOOSE)      Anesthesia Plan  ASA Score- 3 Emergent    Anesthesia Type- general with ASA Monitors. Additional Monitors:   Airway Plan: ETT. Comment: Loose tooth    . Plan Factors-Exercise tolerance (METS): >4 METS. Chart reviewed. EKG reviewed. Existing labs reviewed. Patient summary reviewed. Patient is not a current smoker. Patient not instructed to abstain from smoking on day of procedure. Patient did not smoke on day of surgery. Induction- intravenous. Postoperative Plan-     Informed Consent- Anesthetic plan and risks discussed with patient.

## 2023-08-28 PROBLEM — N40.1 BENIGN PROSTATIC HYPERPLASIA WITH URINARY OBSTRUCTION: Status: ACTIVE | Noted: 2023-08-28

## 2023-08-28 PROBLEM — N13.8 BENIGN PROSTATIC HYPERPLASIA WITH URINARY OBSTRUCTION: Status: ACTIVE | Noted: 2023-08-28

## 2023-08-28 LAB
ANION GAP SERPL CALCULATED.3IONS-SCNC: 11 MMOL/L
BUN SERPL-MCNC: 42 MG/DL (ref 5–25)
C DIFF TOX GENS STL QL NAA+PROBE: NEGATIVE
CALCIUM SERPL-MCNC: 7.2 MG/DL (ref 8.4–10.2)
CAMPYLOBACTER DNA SPEC NAA+PROBE: NORMAL
CHLORIDE SERPL-SCNC: 99 MMOL/L (ref 96–108)
CO2 SERPL-SCNC: 26 MMOL/L (ref 21–32)
CREAT SERPL-MCNC: 2.75 MG/DL (ref 0.6–1.3)
ERYTHROCYTE [DISTWIDTH] IN BLOOD BY AUTOMATED COUNT: 14.1 % (ref 11.6–15.1)
GFR SERPL CREATININE-BSD FRML MDRD: 22 ML/MIN/1.73SQ M
GLUCOSE SERPL-MCNC: 139 MG/DL (ref 65–140)
HCT VFR BLD AUTO: 37.5 % (ref 36.5–49.3)
HGB BLD-MCNC: 12.6 G/DL (ref 12–17)
LACTATE SERPL-SCNC: 2.9 MMOL/L (ref 0.5–2)
MAGNESIUM SERPL-MCNC: 2.1 MG/DL (ref 1.9–2.7)
MCH RBC QN AUTO: 29.5 PG (ref 26.8–34.3)
MCHC RBC AUTO-ENTMCNC: 33.6 G/DL (ref 31.4–37.4)
MCV RBC AUTO: 88 FL (ref 82–98)
PLATELET # BLD AUTO: 76 THOUSANDS/UL (ref 149–390)
PMV BLD AUTO: 10.8 FL (ref 8.9–12.7)
POTASSIUM SERPL-SCNC: 3.1 MMOL/L (ref 3.5–5.3)
RBC # BLD AUTO: 4.27 MILLION/UL (ref 3.88–5.62)
SALMONELLA DNA SPEC QL NAA+PROBE: NORMAL
SHIGA TOXIN STX GENE SPEC NAA+PROBE: NORMAL
SHIGELLA DNA SPEC QL NAA+PROBE: NORMAL
SODIUM SERPL-SCNC: 136 MMOL/L (ref 135–147)
WBC # BLD AUTO: 24.75 THOUSAND/UL (ref 4.31–10.16)

## 2023-08-28 PROCEDURE — 83735 ASSAY OF MAGNESIUM: CPT | Performed by: NURSE PRACTITIONER

## 2023-08-28 PROCEDURE — 87505 NFCT AGENT DETECTION GI: CPT | Performed by: NURSE PRACTITIONER

## 2023-08-28 PROCEDURE — 83605 ASSAY OF LACTIC ACID: CPT | Performed by: NURSE PRACTITIONER

## 2023-08-28 PROCEDURE — 99232 SBSQ HOSP IP/OBS MODERATE 35: CPT | Performed by: INTERNAL MEDICINE

## 2023-08-28 PROCEDURE — 87493 C DIFF AMPLIFIED PROBE: CPT | Performed by: NURSE PRACTITIONER

## 2023-08-28 PROCEDURE — 85027 COMPLETE CBC AUTOMATED: CPT | Performed by: NURSE PRACTITIONER

## 2023-08-28 PROCEDURE — 87040 BLOOD CULTURE FOR BACTERIA: CPT | Performed by: STUDENT IN AN ORGANIZED HEALTH CARE EDUCATION/TRAINING PROGRAM

## 2023-08-28 PROCEDURE — 87077 CULTURE AEROBIC IDENTIFY: CPT | Performed by: STUDENT IN AN ORGANIZED HEALTH CARE EDUCATION/TRAINING PROGRAM

## 2023-08-28 PROCEDURE — 87046 STOOL CULTR AEROBIC BACT EA: CPT | Performed by: NURSE PRACTITIONER

## 2023-08-28 PROCEDURE — 80048 BASIC METABOLIC PNL TOTAL CA: CPT | Performed by: NURSE PRACTITIONER

## 2023-08-28 PROCEDURE — 99232 SBSQ HOSP IP/OBS MODERATE 35: CPT

## 2023-08-28 RX ORDER — LANOLIN ALCOHOL/MO/W.PET/CERES
3 CREAM (GRAM) TOPICAL
Status: DISCONTINUED | OUTPATIENT
Start: 2023-08-28 | End: 2023-08-30 | Stop reason: HOSPADM

## 2023-08-28 RX ORDER — POTASSIUM CHLORIDE 29.8 MG/ML
40 INJECTION INTRAVENOUS EVERY 4 HOURS
Status: DISCONTINUED | OUTPATIENT
Start: 2023-08-28 | End: 2023-08-28

## 2023-08-28 RX ORDER — TEMAZEPAM 7.5 MG/1
15 CAPSULE ORAL
Status: DISCONTINUED | OUTPATIENT
Start: 2023-08-28 | End: 2023-08-29

## 2023-08-28 RX ORDER — POTASSIUM CHLORIDE 20 MEQ/1
40 TABLET, EXTENDED RELEASE ORAL ONCE
Status: COMPLETED | OUTPATIENT
Start: 2023-08-28 | End: 2023-08-28

## 2023-08-28 RX ADMIN — CHLORHEXIDINE GLUCONATE 15 ML: 1.2 RINSE ORAL at 08:27

## 2023-08-28 RX ADMIN — Medication 3 MG: at 21:06

## 2023-08-28 RX ADMIN — HEPARIN SODIUM 5000 UNITS: 5000 INJECTION INTRAVENOUS; SUBCUTANEOUS at 21:06

## 2023-08-28 RX ADMIN — DEXTROAMPHETAMINE SACCHARATE, AMPHETAMINE ASPARTATE, DEXTROAMPHETAMINE SULFATE AND AMPHETAMINE SULFATE 20 MG: 2.5; 2.5; 2.5; 2.5 TABLET ORAL at 08:27

## 2023-08-28 RX ADMIN — POTASSIUM CHLORIDE 40 MEQ: 29.8 INJECTION, SOLUTION INTRAVENOUS at 07:08

## 2023-08-28 RX ADMIN — POTASSIUM CHLORIDE 40 MEQ: 1500 TABLET, EXTENDED RELEASE ORAL at 10:45

## 2023-08-28 RX ADMIN — PIPERACILLIN SODIUM AND TAZOBACTAM SODIUM 3.38 G: 36; 4.5 INJECTION, POWDER, LYOPHILIZED, FOR SOLUTION INTRAVENOUS at 18:40

## 2023-08-28 RX ADMIN — PIPERACILLIN SODIUM AND TAZOBACTAM SODIUM 3.38 G: 36; 4.5 INJECTION, POWDER, LYOPHILIZED, FOR SOLUTION INTRAVENOUS at 05:56

## 2023-08-28 RX ADMIN — DEXTROAMPHETAMINE SACCHARATE, AMPHETAMINE ASPARTATE, DEXTROAMPHETAMINE SULFATE AND AMPHETAMINE SULFATE 20 MG: 2.5; 2.5; 2.5; 2.5 TABLET ORAL at 14:54

## 2023-08-28 RX ADMIN — PANTOPRAZOLE SODIUM 40 MG: 40 TABLET, DELAYED RELEASE ORAL at 05:56

## 2023-08-28 RX ADMIN — HEPARIN SODIUM 5000 UNITS: 5000 INJECTION INTRAVENOUS; SUBCUTANEOUS at 05:56

## 2023-08-28 RX ADMIN — CHLORHEXIDINE GLUCONATE 15 ML: 1.2 RINSE ORAL at 21:05

## 2023-08-28 RX ADMIN — HEPARIN SODIUM 5000 UNITS: 5000 INJECTION INTRAVENOUS; SUBCUTANEOUS at 14:54

## 2023-08-28 NOTE — ASSESSMENT & PLAN NOTE
· Status post Urolift with refractory urinary retention  · Previous urodynamic study demonstrated small noncompliant bladder  · Has been CIC dependent for 1 year  · Giles catheter currently in place draining clear yellow urine to gravity. Plan:  · Maintain giles catheter for continued hemodynamic monitoring. Consider removal prior to discharge when leukocytosis and PHANI have improved to near or at baseline. Patient should resume CIC following giles catheter removal.   · Follow-up outpatient.

## 2023-08-28 NOTE — ASSESSMENT & PLAN NOTE
· CT abdomen pelvis: left hydronephrosis secondary to a 1.7 x 1.1 x 0.8 cm stone at the proximal left ureter at the level of the L3-L4 disc space.   · S/P left PCN placement by interventional radiology   · Urology is following

## 2023-08-28 NOTE — ASSESSMENT & PLAN NOTE
· Secondary to obstructing nephrolithiasis as well as enterocolitis with elevated lactic acid and significant leukocytosis. · Status post Left PCN placement by IR on 8/27 for renal decompression. · Afebrile with stable vital signs   · Leukocytosis gradually improving currently 24.75 previously 26.41  · Still mild lactic acidosis 2.9  · Stool and urine cultures pending  · Preliminary blood cultures positive with one culture positive for gram negative chandu and second culture positive for gram positive cocci in clusters.   · Empirically on Zosyn    Plan:  · Continue Zosyn and adjust based on final C&S  · Trend labs  · Monitor fever curve

## 2023-08-28 NOTE — PROGRESS NOTES
[facility]  Progress Note  Name: Clau Mendieta  MRN: [id number]  Unit/Bed#: ICU 03 I Date of Admission: 8/27/2023   Date of Service: 8/28/2023 I Hospital Day: 1    Assessment/Plan   * Sepsis [facility])  Assessment & Plan  · Presented with septic shock 2/2 to UTI, left hydronephrosis and left ureter calculus  · S/p Left PCN by IR. urology is on board  · R IJ placed, started on Levo, weaned off on 7/27 2 pm  · S/p 3 L of IVF for resuscitation in ED  · Zosyn, Flagyl and ampicillin given in ED, following by Zosyn    Plan:  · Continue pressors as needed  · Fluid resuscitation  · Treat fevers as needed  · Continue IV antibiotics, Zosyn day 2  · Continuous cardiopulmonary monitoring    Diarrhea of presumed infectious origin  Assessment & Plan  · Check C. difficile and stool viral panel  · IV fluids to rehydrate  · If C. difficile positive start p.o. vancomycin  · Continuous cardiopulmonary monitoring    Hydronephrosis concurrent with and due to calculi of kidney and ureter  Assessment & Plan  · Transferred to [facility] 2/2 hydronephrosis due to a calculus in the left ureter  · S/p left PCN placement by IR on 8/27/23  · Patient is septic with hypotension and lactic acidosis  · Received IV resuscitation  · Initially required pressors, weaned off on 8/27    ADD (attention deficit disorder)  Assessment & Plan  · Continue Adderall    Hydronephrosis  Assessment & Plan  · CT abdomen pelvis: left hydronephrosis secondary to a 1.7 x 1.1 x 0.8 cm stone at the proximal left ureter at the level of the L3-L4 disc space.   · S/P left PCN placement by interventional radiology   · Urology is following    Stage 2 chronic kidney disease  Assessment & Plan  Lab Results   Component Value Date    EGFR 22 08/28/2023    EGFR 17 08/27/2023    EGFR 12 08/27/2023    CREATININE 2.75 (H) 08/28/2023    CREATININE 3.44 (H) 08/27/2023    CREATININE 4.35 (H) 08/27/2023   · Monitor kidney indices  · Avoid nephrotoxic drugs  · Monitor input and output  · Consult nephrology    Acute renal failure with tubular necrosis (HCC)  Assessment & Plan  · Creatinine 4.35 POA   · Today Cr trended down to 2.75  · Monitor kidney indices  · Avoid nephrotoxic medications  · Monitor input and output  · Monitor daily weight  · Nephrology consult           ICU Core Measures     A: Assess, Prevent, and Manage Pain · Has pain been assessed? Yes  · Need for changes to pain regimen? No   B: Both SAT/SAT  · N/A   C: Choice of Sedation · RASS Goal: 0 Alert and Calm  · Need for changes to sedation or analgesia regimen? No   D: Delirium · CAM-ICU: Negative   E: Early Mobility  · Plan for early mobility? Yes   F: Family Engagement · Plan for family engagement today? Yes       Antibiotic Review: Patient on appropriate coverage based on culture data. Review of Invasive Devices: Ordonez Plan: Voiding trial after improvement in ambulation   Central access plan: will consider to remove R IJ if BP is remain stable during the day      Prophylaxis:  VTE VTE covered by:  heparin (porcine), Subcutaneous, 5,000 Units at 08/28/23 0556       Stress Ulcer  covered bypantoprazole (PROTONIX) EC tablet 40 mg [[id number]]       Subjective   HPI/24hr events: no acute events overnight  no acute events overnight, pt states that he is feeling well and express no new complains. States that he is not sleeping well as he can't turn on his left side due to PCN.         Objective                            Vitals I/O      Most Recent Min/Max in 24hrs   Temp 97.9 °F (36.6 °C) Temp  Min: 97.6 °F (36.4 °C)  Max: 98 °F (36.7 °C)   Pulse 76 Pulse  Min: 76  Max: 114   Resp 18 Resp  Min: 16  Max: 27   /69 BP  Min: 83/57  Max: 118/72   O2 Sat 95 % SpO2  Min: 89 %  Max: 100 %      Intake/Output Summary (Last 24 hours) at 8/28/2023 3004  Last data filed at 8/28/2023 0430  Gross per 24 hour   Intake 1540.75 ml   Output 1535 ml   Net 5.75 ml         Diet Regular; Regular House     Invasive Monitoring Physical exam   Arterial Line  Arlyn BP    No data recorded   MAP    No data recorded      Physical Exam  Eyes:      Conjunctiva/sclera: Conjunctivae normal.   HENT:      Head: Normocephalic and atraumatic. Mouth/Throat:      Mouth: Mucous membranes are moist.   Cardiovascular:      Rate and Rhythm: Normal rate and regular rhythm. Abdominal:      General: Bowel sounds are normal.      Palpations: Abdomen is soft. Comments: Left PCN in place with 100 cc of serosanguinous fluid in drainage bag   Constitutional:       Appearance: He is underdeveloped. He is not ill-appearing or toxic-appearing. Interventions: He is not sedated and not intubated. Pulmonary:      Effort: Pulmonary effort is normal. No accessory muscle usage, respiratory distress or accessory muscle usage. He is not intubated. Breath sounds: Normal breath sounds. No wheezing. Neurological:      General: No focal deficit present. Mental Status: He is alert. Genitourinary/Anorectal:  Ordonez       Diagnostic Studies      EKG: from 8/27 sinus tachy with PVC's  Imaging: CT abdomen pelvise I have personally reviewed pertinent reports.        Medications:  Scheduled PRN   amphetamine-dextroamphetamine, 20 mg, BID (AM & Afternoon)  chlorhexidine, 15 mL, Q12H ERICK  heparin (porcine), 5,000 Units, Q8H [address]  pantoprazole, 40 mg, Early Morning  piperacillin-tazobactam, 3.375 g, Q12H  piperacillin-tazobactam, 3.375 g, Once  potassium chloride, 40 mEq, Q4H      aluminum-magnesium hydroxide-simethicone, 30 mL, Q4H PRN  fentanyl citrate (PF), 25 mcg, Q10 Min PRN  HYDROcodone-acetaminophen, 1 tablet, Q6H PRN  ondansetron, 4 mg, Q6H PRN       Continuous    norepinephrine, 1-30 mcg/min, Last Rate: Stopped (08/27/23 1403)         Labs:    CBC    Recent Labs     08/27/23  0328 08/27/23  0924 08/27/23  0952 08/28/23  0616   WBC 31.47*  --  26.41* 24.75*   HGB 15.8  --  12.7 12.6   HCT 47.7  --  38.5 37.5      < > 138* 76*   BANDSPCT 9*  -- --   --     < > = values in this interval not displayed. BMP    Recent Labs     08/27/23  0952 08/28/23  0444   SODIUM 139 136   K 3.0* 3.1*    99   CO2 26 26   AGAP 12 11   BUN 40* 42*   CREATININE 3.44* 2.75*   CALCIUM 7.2* 7.2*       Coags    Recent Labs     08/27/23  0641   INR 1.77*        Additional Electrolytes  Recent Labs     08/27/23  0952 08/28/23  0444   MG 2.1 2.1   PHOS 3.3  --           Blood Gas    No recent results  No recent results LFTs  Recent Labs     08/27/23  0952   ALT 16   AST 35   ALKPHOS 48   ALB 2.9*   TBILI 0.41       Infectious  No recent results  Glucose  Recent Labs     08/27/23  0328 08/27/23  0952 08/28/23  0444   GLUC 139 111 139               Critical Care Time Delivered: Upon my evaluation, this patient had a high probability of imminent or life-threatening deterioration due to urosepsis, which required my direct attention, intervention, and personal management. I have personally provided 30 minutes of critical care time, exclusive of procedures, teaching, family meetings, and any prior time recorded by providers other than myself.      Marichuy Starkey MD

## 2023-08-28 NOTE — ASSESSMENT & PLAN NOTE
· CT imaging 8/27 with marked left-sided hydronephrosis secondary to a 1.7 x 1.1 x 0.8 cm proximal left ureteral calculi. · Status post left PCN placement by IR 8/27  · Draining clear yellow urine to gravity  · PHANI improving; current creatinine 2.75 previously 3.44    Plan:  · Maintain left PCN to straight drainage. Flush per IR protocol  · Patient will need outpatient follow-up after discharge to discuss definitive stone treatment with ureteroscopy/laser lithotripsy with or without cystolitholapaxy.

## 2023-08-28 NOTE — PLAN OF CARE
Problem: MOBILITY - ADULT  Goal: Maintain or return to baseline ADL function  Description: INTERVENTIONS:  -  Assess patient's ability to carry out ADLs; assess patient's baseline for ADL function and identify physical deficits which impact ability to perform ADLs (bathing, care of mouth/teeth, toileting, grooming, dressing, etc.)  - Assess/evaluate cause of self-care deficits   - Assess range of motion  - Assess patient's mobility; develop plan if impaired  - Assess patient's need for assistive devices and provide as appropriate  - Encourage maximum independence but intervene and supervise when necessary  - Involve family in performance of ADLs  - Assess for home care needs following discharge   - Consider OT consult to assist with ADL evaluation and planning for discharge  - Provide patient education as appropriate  Outcome: Progressing  Goal: Maintains/Returns to pre admission functional level  Description: INTERVENTIONS:  - Perform BMAT or MOVE assessment daily.   - Set and communicate daily mobility goal to care team and patient/family/caregiver.    - Collaborate with rehabilitation services on mobility goals if consulted  - Out of bed for toileting  - Record patient progress and toleration of activity level   Outcome: Progressing     Problem: Prexisting or High Potential for Compromised Skin Integrity  Goal: Skin integrity is maintained or improved  Description: INTERVENTIONS:  - Identify patients at risk for skin breakdown  - Assess and monitor skin integrity  - Assess and monitor nutrition and hydration status  - Monitor labs   - Assess for incontinence   - Turn and reposition patient  - Assist with mobility/ambulation  - Relieve pressure over bony prominences  - Avoid friction and shearing  - Provide appropriate hygiene as needed including keeping skin clean and dry  - Evaluate need for skin moisturizer/barrier cream  - Collaborate with interdisciplinary team   - Patient/family teaching  - Consider wound care consult   Outcome: Progressing     Problem: PAIN - ADULT  Goal: Verbalizes/displays adequate comfort level or baseline comfort level  Description: Interventions:  - Encourage patient to monitor pain and request assistance  - Assess pain using appropriate pain scale  - Administer analgesics based on type and severity of pain and evaluate response  - Implement non-pharmacological measures as appropriate and evaluate response  - Consider cultural and social influences on pain and pain management  - Notify physician/advanced practitioner if interventions unsuccessful or patient reports new pain  Outcome: Progressing     Problem: INFECTION - ADULT  Goal: Absence or prevention of progression during hospitalization  Description: INTERVENTIONS:  - Assess and monitor for signs and symptoms of infection  - Monitor lab/diagnostic results  - Monitor all insertion sites, i.e. indwelling lines, tubes, and drains  - Monitor endotracheal if appropriate and nasal secretions for changes in amount and color  - Powers appropriate cooling/warming therapies per order  - Administer medications as ordered  - Instruct and encourage patient and family to use good hand hygiene technique  - Identify and instruct in appropriate isolation precautions for identified infection/condition  Outcome: Progressing  Goal: Absence of fever/infection during neutropenic period  Description: INTERVENTIONS:  - Monitor WBC    Outcome: Progressing     Problem: SAFETY ADULT  Goal: Maintain or return to baseline ADL function  Description: INTERVENTIONS:  -  Assess patient's ability to carry out ADLs; assess patient's baseline for ADL function and identify physical deficits which impact ability to perform ADLs (bathing, care of mouth/teeth, toileting, grooming, dressing, etc.)  - Assess/evaluate cause of self-care deficits   - Assess range of motion  - Assess patient's mobility; develop plan if impaired  - Assess patient's need for assistive devices and provide as appropriate  - Encourage maximum independence but intervene and supervise when necessary  - Involve family in performance of ADLs  - Assess for home care needs following discharge   - Consider OT consult to assist with ADL evaluation and planning for discharge  - Provide patient education as appropriate  Outcome: Progressing  Goal: Maintains/Returns to pre admission functional level  Description: INTERVENTIONS:  - Perform BMAT or MOVE assessment daily.   - Set and communicate daily mobility goal to care team and patient/family/caregiver.    - Collaborate with rehabilitation services on mobility goals if consulted  - Out of bed for toileting  - Record patient progress and toleration of activity level   Outcome: Progressing  Goal: Patient will remain free of falls  Description: INTERVENTIONS:  - Educate patient/family on patient safety including physical limitations  - Instruct patient to call for assistance with activity   - Consult OT/PT to assist with strengthening/mobility   - Keep Call bell within reach  - Keep bed low and locked with side rails adjusted as appropriate  - Keep care items and personal belongings within reach  - Initiate and maintain comfort rounds  - Make Fall Risk Sign visible to staff  - Apply yellow socks and bracelet for high fall risk patients  - Consider moving patient to room near nurses station  Outcome: Progressing     Problem: DISCHARGE PLANNING  Goal: Discharge to home or other facility with appropriate resources  Description: INTERVENTIONS:  - Identify barriers to discharge w/patient and caregiver  - Arrange for needed discharge resources and transportation as appropriate  - Identify discharge learning needs (meds, wound care, etc.)  - Arrange for interpretive services to assist at discharge as needed  - Refer to Case Management Department for coordinating discharge planning if the patient needs post-hospital services based on physician/advanced practitioner order or complex needs related to functional status, cognitive ability, or social support system  Outcome: Progressing     Problem: Knowledge Deficit  Goal: Patient/family/caregiver demonstrates understanding of disease process, treatment plan, medications, and discharge instructions  Description: Complete learning assessment and assess knowledge base.   Interventions:  - Provide teaching at level of understanding  - Provide teaching via preferred learning methods  Outcome: Progressing     Problem: GENITOURINARY - ADULT  Goal: Maintains or returns to baseline urinary function  Description: INTERVENTIONS:  - Assess urinary function  - Encourage oral fluids to ensure adequate hydration if ordered  - Administer IV fluids as ordered to ensure adequate hydration  - Administer ordered medications as needed  - Offer frequent toileting  - Follow urinary retention protocol if ordered  Outcome: Progressing  Goal: Absence of urinary retention  Description: INTERVENTIONS:  - Assess patient’s ability to void and empty bladder  - Monitor I/O  - Bladder scan as needed  - Discuss with physician/AP medications to alleviate retention as needed  - Discuss catheterization for long term situations as appropriate  Outcome: Progressing  Goal: Urinary catheter remains patent  Description: INTERVENTIONS:  - Assess patency of urinary catheter  - If patient has a chronic giles, consider changing catheter if non-functioning  - Follow guidelines for intermittent irrigation of non-functioning urinary catheter  Outcome: Progressing

## 2023-08-28 NOTE — ASSESSMENT & PLAN NOTE
· Presented with septic shock 2/2 to UTI, left hydronephrosis and left ureter calculus  · S/p Left PCN by IR. urology is on board  · R IJ placed, started on Levo, weaned off on 7/27 2 pm  · S/p 3 L of IVF for resuscitation in ED  · Zosyn, Flagyl and ampicillin given in ED, following by Zosyn    Plan:  · Continue pressors as needed  · Fluid resuscitation  · Treat fevers as needed  · Continue IV antibiotics, Zosyn day 2  · Continuous cardiopulmonary monitoring

## 2023-08-28 NOTE — ASSESSMENT & PLAN NOTE
· Transferred to [facility] 2/2 hydronephrosis due to a calculus in the left ureter  · S/p left PCN placement by IR on 8/27/23  · Patient is septic with hypotension and lactic acidosis  · Received IV resuscitation  · Initially required pressors, weaned off on 8/27

## 2023-08-28 NOTE — PROGRESS NOTES
Progress Note - Urology  Zoey Bui 1955, 77 y.o. male MRN: [id number]    Unit/Bed#: ICU 03 Encounter: [id number]    Benign prostatic hyperplasia with urinary obstruction  Assessment & Plan  · Status post Urolift with refractory urinary retention  · Previous urodynamic study demonstrated small noncompliant bladder  · Has been CIC dependent for 1 year  · Giles catheter currently in place draining clear yellow urine to gravity. Plan:  · Maintain giles catheter for continued hemodynamic monitoring. Consider removal prior to discharge when leukocytosis and PHANI have improved to near or at baseline. Patient should resume CIC following giles catheter removal.   · Follow-up outpatient. Hydronephrosis  Assessment & Plan  · CT imaging 8/27 with marked left-sided hydronephrosis secondary to a 1.7 x 1.1 x 0.8 cm proximal left ureteral calculi. · Status post left PCN placement by IR 8/27  · Draining clear yellow urine to gravity  · PHANI improving; current creatinine 2.75 previously 3.44    Plan:  · Maintain left PCN to straight drainage. Flush per IR protocol  · Patient will need outpatient follow-up after discharge to discuss definitive stone treatment with ureteroscopy/laser lithotripsy with or without cystolitholapaxy. * Sepsis ([address])  Assessment & Plan  · Secondary to obstructing nephrolithiasis as well as enterocolitis with elevated lactic acid and significant leukocytosis. · Status post Left PCN placement by IR on 8/27 for renal decompression. · Afebrile with stable vital signs   · Leukocytosis gradually improving currently 24.75 previously 26.41  · Still mild lactic acidosis 2.9  · Stool and urine cultures pending  · Preliminary blood cultures positive with one culture positive for gram negative chandu and second culture positive for gram positive cocci in clusters.   · Empirically on Zosyn    Plan:  · Continue Zosyn and adjust based on final C&S  · Trend labs  · Monitor fever curve        Urology will sign off but remain available for any further inpatient needs. Please feel free to contact the provider currently covering the Urology [facility] role for this campus with questions or concerns. Subjective: Patient is lying in bed resting comfortably in no acute distress. He denies any fevers, abdominal pain, flank pain, SOB, or CP. Review of Systems   Constitutional: Negative for chills and fever. HENT: Negative for congestion and sore throat. Respiratory: Negative for cough and shortness of breath. Cardiovascular: Negative for chest pain and leg swelling. Gastrointestinal: Positive for diarrhea. Negative for abdominal pain, constipation, nausea and vomiting. Genitourinary: Negative for difficulty urinating, dysuria, flank pain, frequency, hematuria and urgency. Musculoskeletal: Negative for back pain and gait problem. Skin: Negative for wound. Allergic/Immunologic: Negative for immunocompromised state. Neurological: Negative for dizziness, weakness and numbness. Hematological: Does not bruise/bleed easily. Objective:  Nursing Rounds:   Vitals: Blood pressure 103/65, pulse 72, temperature 98.1 °F (36.7 °C), temperature source Oral, resp. rate 17, height 5' 8" (1.727 m), weight 80.1 kg (176 lb 9.4 oz), SpO2 96 %. ,Body mass index is 26.85 kg/m². Physical Exam  Vitals reviewed. Constitutional:       General: He is not in acute distress. Appearance: Normal appearance. He is not ill-appearing or toxic-appearing. HENT:      Head: Normocephalic and atraumatic. Eyes:      General: No scleral icterus. Conjunctiva/sclera: Conjunctivae normal.   Cardiovascular:      Rate and Rhythm: Normal rate. Pulmonary:      Effort: Pulmonary effort is normal. No respiratory distress. Abdominal:      Tenderness: There is no right CVA tenderness or left CVA tenderness. Hernia: No hernia is present.    Genitourinary:     Comments: Urethral giles catheter and Left PCN draining clear yellow urine to gravity. Musculoskeletal:      Cervical back: Normal range of motion. Right lower leg: No edema. Left lower leg: No edema. Skin:     General: Skin is warm and dry. Coloration: Skin is not jaundiced or pale. Neurological:      General: No focal deficit present. Mental Status: He is alert and oriented to person, place, and time. Mental status is at baseline. Gait: Gait normal.   Psychiatric:         Mood and Affect: Mood normal.         Behavior: Behavior normal.         Thought Content: Thought content normal.         Judgment: Judgment normal.         [id number]  CT ABDOMEN AND PELVIS WITHOUT IV CONTRAST     INDICATION:   Abdominal pain, acute, nonlocalized  abd pain.     COMPARISON:  None.     TECHNIQUE:  CT examination of the abdomen and pelvis was performed without intravenous contrast. Multiplanar 2D reformatted images were created from the source data.     This examination, like all CT scans performed in the [facility], was performed utilizing techniques to minimize radiation dose exposure, including the use of iterative reconstruction and automated exposure control. Radiation dose length   product (DLP) for this visit:  646.46 mGy-cm     Enteric contrast was administered.     FINDINGS:     ABDOMEN     LOWER CHEST:  Atelectatic changes are noted at the lung bases.     LIVER/BILIARY TREE:  Unremarkable.     GALLBLADDER:  No calcified gallstones.  No pericholecystic inflammatory change.     SPLEEN:  Unremarkable.     PANCREAS: Scattered pancreatic parenchymal calcifications consistent with history of chronic pancreatitis.     ADRENAL GLANDS:  There is low density lobulated thickening of adrenal glands bilaterally statistically most likely to represent adenomatous hyperplasia.     KIDNEYS/URETERS: Marked left hydronephrosis secondary to a 1.7 x 1.1 x 0.8 cm stone at the proximal left ureter at the level of the L3-L4 disc space. Additional group of stones at the left inferior pole measuring up to 5 mm. Tiny 2 mm stone at the right   inferior pole.     STOMACH AND BOWEL: Marked fluid distention of numerous loops of small bowel without discrete transition point identified. Liquid stool throughout the colon. .     APPENDIX:  No findings to suggest appendicitis.     ABDOMINOPELVIC CAVITY:  No ascites. No pneumoperitoneum. No lymphadenopathy.     VESSELS:  Unremarkable for patient's age.     PELVIS     REPRODUCTIVE ORGANS:  Unremarkable for patient's age.     URINARY BLADDER: Numerous bladder calculi     ABDOMINAL WALL/INGUINAL REGIONS:  Unremarkable.     OSSEOUS STRUCTURES:  No acute fracture or destructive osseous lesion. Status post ORIF of right proximal femur.     IMPRESSION:     Marked left hydronephrosis secondary to a 1.7 x 1.1 x 0.8 cm stone at the proximal left ureter at the level of the L3-L4 disc space.     Findings consistent with enterocolitis.          Labs:  Recent Labs     08/27/23  0328 08/27/23  0952 08/28/23  0616   WBC 31.47* 26.41* 24.75*       Recent Labs     08/27/23  0328 08/27/23  0952 08/28/23  0616   HGB 15.8 12.7 12.6     Recent Labs     08/27/23  0328 08/27/23  0952 08/28/23  0616   HCT 47.7 38.5 37.5     Recent Labs     08/27/23  0328 08/27/23  0952 08/28/23  0444   CREATININE 4.35* 3.44* 2.75*         History:    Past Medical History:   Diagnosis Date   • ADD (attention deficit disorder)    • Benign prostatic hyperplasia    • Chronic kidney disease    • Dental crowns present    • Depression    • Exercises 3 to 4 times per week    • History of 2019 novel coronavirus disease (COVID-19) 01/2021    recovered at home/'pretty mild symptoms like the flu"   • Indwelling Ordonez catheter present    • Renal disorder    • Retention of urine    • Urinary retention    • Wears glasses     reading     Social History     Socioeconomic History   • Marital status: Single     Spouse name: Not on file   • Number of children: Not on file   • Years of education: Not on file   • Highest education level: Not on file   Occupational History   • Not on file   Tobacco Use   • Smoking status: Former   • Smokeless tobacco: Never   Vaping Use   • Vaping Use: Never used   Substance and Sexual Activity   • Alcohol use: Never   • Drug use: Never   • Sexual activity: Not on file     Comment: defer   Other Topics Concern   • Not on file   Social History Narrative   • Not on file     Social Determinants of Health     Financial Resource Strain: Not on file   Food Insecurity: Not on file   Transportation Needs: Not on file   Physical Activity: Not on file   Stress: Not on file   Social Connections: Not on file   Intimate Partner Violence: Not on file   Housing Stability: Not on file     Past Surgical History:   Procedure Laterality Date   • FRACTURE SURGERY Right     R. Hip Sx; screw implanted   • IR NEPHROSTOMY TUBE PLACEMENT  8/27/2023   • ND CYSTO INSERTION TRANSPROSTATIC IMPLANT SINGLE N/A 10/20/2021    Procedure: CYSTOSCOPY WITH INSERTION UROLIFT;  Surgeon: Jeovanny Riley MD;  Location: [facility] OR;  Service: Urology   • ND RPR 1ST INGUN HRNA AGE 5 YRS/> REDUCIBLE Right 4/27/2021    Procedure: OPEN INGUINAL HERNIA REPAIR WITH MESH;  Surgeon: Nidia Maynard MD;  Location: [address] [place] [address] MAIN OR;  Service: General   • ND RPR UMBILICAL HRNA 5 YRS/> REDUCIBLE N/A 4/27/2021    Procedure: OPEN UMBILICAL HERNIA REPAIR WITH MESH;  Surgeon: Nidia Maynard MD;  Location: [address] [place] [address] OR;  Service: General   • TONSILLECTOMY     • WISDOM TOOTH EXTRACTION       No family history on file.     QUIANA Valerio  Date: 8/28/2023 Time: 10:50 AM

## 2023-08-28 NOTE — ASSESSMENT & PLAN NOTE
· Creatinine 4.35 POA   · Today Cr trended down to 2.75  · Monitor kidney indices  · Avoid nephrotoxic medications  · Monitor input and output  · Monitor daily weight  · Nephrology consult

## 2023-08-28 NOTE — ASSESSMENT & PLAN NOTE
Lab Results   Component Value Date    EGFR 22 08/28/2023    EGFR 17 08/27/2023    EGFR 12 08/27/2023    CREATININE 2.75 (H) 08/28/2023    CREATININE 3.44 (H) 08/27/2023    CREATININE 4.35 (H) 08/27/2023   · Monitor kidney indices  · Avoid nephrotoxic drugs  · Monitor input and output  · Consult nephrology

## 2023-08-29 ENCOUNTER — TELEPHONE (OUTPATIENT)
Dept: OTHER | Facility: HOSPITAL | Age: 68
End: 2023-08-29

## 2023-08-29 PROBLEM — R78.81 GRAM-NEGATIVE BACTEREMIA: Status: ACTIVE | Noted: 2023-08-29

## 2023-08-29 LAB
ANION GAP SERPL CALCULATED.3IONS-SCNC: 8 MMOL/L
BACTERIA UR CULT: ABNORMAL
BUN SERPL-MCNC: 37 MG/DL (ref 5–25)
C DIFF TOX GENS STL QL NAA+PROBE: NEGATIVE
CALCIUM SERPL-MCNC: 7.6 MG/DL (ref 8.4–10.2)
CAMPYLOBACTER DNA SPEC NAA+PROBE: NORMAL
CHLORIDE SERPL-SCNC: 103 MMOL/L (ref 96–108)
CO2 SERPL-SCNC: 25 MMOL/L (ref 21–32)
CREAT SERPL-MCNC: 1.7 MG/DL (ref 0.6–1.3)
ERYTHROCYTE [DISTWIDTH] IN BLOOD BY AUTOMATED COUNT: 14.2 % (ref 11.6–15.1)
GFR SERPL CREATININE-BSD FRML MDRD: 39 ML/MIN/1.73SQ M
GLUCOSE SERPL-MCNC: 123 MG/DL (ref 65–140)
HCT VFR BLD AUTO: 36.9 % (ref 36.5–49.3)
HGB BLD-MCNC: 12.4 G/DL (ref 12–17)
MAGNESIUM SERPL-MCNC: 2.4 MG/DL (ref 1.9–2.7)
MCH RBC QN AUTO: 29.8 PG (ref 26.8–34.3)
MCHC RBC AUTO-ENTMCNC: 33.6 G/DL (ref 31.4–37.4)
MCV RBC AUTO: 89 FL (ref 82–98)
PLATELET # BLD AUTO: 55 THOUSANDS/UL (ref 149–390)
PMV BLD AUTO: 12.5 FL (ref 8.9–12.7)
POTASSIUM SERPL-SCNC: 3.5 MMOL/L (ref 3.5–5.3)
RBC # BLD AUTO: 4.16 MILLION/UL (ref 3.88–5.62)
SALMONELLA DNA SPEC QL NAA+PROBE: NORMAL
SHIGA TOXIN STX GENE SPEC NAA+PROBE: NORMAL
SHIGELLA DNA SPEC QL NAA+PROBE: NORMAL
SODIUM SERPL-SCNC: 136 MMOL/L (ref 135–147)
WBC # BLD AUTO: 26 THOUSAND/UL (ref 4.31–10.16)

## 2023-08-29 PROCEDURE — 85027 COMPLETE CBC AUTOMATED: CPT | Performed by: STUDENT IN AN ORGANIZED HEALTH CARE EDUCATION/TRAINING PROGRAM

## 2023-08-29 PROCEDURE — 99232 SBSQ HOSP IP/OBS MODERATE 35: CPT | Performed by: PHYSICIAN ASSISTANT

## 2023-08-29 PROCEDURE — 83735 ASSAY OF MAGNESIUM: CPT | Performed by: STUDENT IN AN ORGANIZED HEALTH CARE EDUCATION/TRAINING PROGRAM

## 2023-08-29 PROCEDURE — 80048 BASIC METABOLIC PNL TOTAL CA: CPT | Performed by: STUDENT IN AN ORGANIZED HEALTH CARE EDUCATION/TRAINING PROGRAM

## 2023-08-29 RX ORDER — DIAZEPAM 5 MG/1
5 TABLET ORAL
Status: DISCONTINUED | OUTPATIENT
Start: 2023-08-29 | End: 2023-08-30 | Stop reason: HOSPADM

## 2023-08-29 RX ORDER — TEMAZEPAM 7.5 MG/1
15 CAPSULE ORAL DAILY PRN
Status: DISCONTINUED | OUTPATIENT
Start: 2023-08-29 | End: 2023-08-29

## 2023-08-29 RX ADMIN — PIPERACILLIN SODIUM AND TAZOBACTAM SODIUM 3.38 G: 36; 4.5 INJECTION, POWDER, LYOPHILIZED, FOR SOLUTION INTRAVENOUS at 06:28

## 2023-08-29 RX ADMIN — Medication 3 MG: at 21:27

## 2023-08-29 RX ADMIN — HEPARIN SODIUM 5000 UNITS: 5000 INJECTION INTRAVENOUS; SUBCUTANEOUS at 05:42

## 2023-08-29 RX ADMIN — DIAZEPAM 5 MG: 5 TABLET ORAL at 21:27

## 2023-08-29 RX ADMIN — TEMAZEPAM 15 MG: 7.5 CAPSULE ORAL at 09:11

## 2023-08-29 RX ADMIN — PIPERACILLIN SODIUM AND TAZOBACTAM SODIUM 3.38 G: 36; 4.5 INJECTION, POWDER, LYOPHILIZED, FOR SOLUTION INTRAVENOUS at 18:14

## 2023-08-29 RX ADMIN — PANTOPRAZOLE SODIUM 40 MG: 40 TABLET, DELAYED RELEASE ORAL at 05:42

## 2023-08-29 NOTE — PLAN OF CARE
Problem: MOBILITY - ADULT  Goal: Maintain or return to baseline ADL function  Description: INTERVENTIONS:  -  Assess patient's ability to carry out ADLs; assess patient's baseline for ADL function and identify physical deficits which impact ability to perform ADLs (bathing, care of mouth/teeth, toileting, grooming, dressing, etc.)  - Assess/evaluate cause of self-care deficits   - Assess range of motion  - Assess patient's mobility; develop plan if impaired  - Assess patient's need for assistive devices and provide as appropriate  - Encourage maximum independence but intervene and supervise when necessary  - Involve family in performance of ADLs  - Assess for home care needs following discharge   - Consider OT consult to assist with ADL evaluation and planning for discharge  - Provide patient education as appropriate  Outcome: Progressing  Goal: Maintains/Returns to pre admission functional level  Description: INTERVENTIONS:  - Perform BMAT or MOVE assessment daily.   - Set and communicate daily mobility goal to care team and patient/family/caregiver. - Collaborate with rehabilitation services on mobility goals if consulted  - Perform Range of Motion  times a day. - Reposition patient every  hours.   - Dangle patient  times a day  - Stand patient  times a day  - Ambulate patient  times a day  - Out of bed to chair  times a day   - Out of bed for meals  times a day  - Out of bed for toileting  - Record patient progress and toleration of activity level   Outcome: Progressing     Problem: Prexisting or High Potential for Compromised Skin Integrity  Goal: Skin integrity is maintained or improved  Description: INTERVENTIONS:  - Identify patients at risk for skin breakdown  - Assess and monitor skin integrity  - Assess and monitor nutrition and hydration status  - Monitor labs   - Assess for incontinence   - Turn and reposition patient  - Assist with mobility/ambulation  - Relieve pressure over bony prominences  - Avoid friction and shearing  - Provide appropriate hygiene as needed including keeping skin clean and dry  - Evaluate need for skin moisturizer/barrier cream  - Collaborate with interdisciplinary team   - Patient/family teaching  - Consider wound care consult   Outcome: Progressing     Problem: PAIN - ADULT  Goal: Verbalizes/displays adequate comfort level or baseline comfort level  Description: Interventions:  - Encourage patient to monitor pain and request assistance  - Assess pain using appropriate pain scale  - Administer analgesics based on type and severity of pain and evaluate response  - Implement non-pharmacological measures as appropriate and evaluate response  - Consider cultural and social influences on pain and pain management  - Notify physician/advanced practitioner if interventions unsuccessful or patient reports new pain  Outcome: Progressing     Problem: INFECTION - ADULT  Goal: Absence or prevention of progression during hospitalization  Description: INTERVENTIONS:  - Assess and monitor for signs and symptoms of infection  - Monitor lab/diagnostic results  - Monitor all insertion sites, i.e. indwelling lines, tubes, and drains  - Monitor endotracheal if appropriate and nasal secretions for changes in amount and color  - [place] appropriate cooling/warming therapies per order  - Administer medications as ordered  - Instruct and encourage patient and family to use good hand hygiene technique  - Identify and instruct in appropriate isolation precautions for identified infection/condition  Outcome: Progressing  Goal: Absence of fever/infection during neutropenic period  Description: INTERVENTIONS:  - Monitor WBC    Outcome: Progressing     Problem: SAFETY ADULT  Goal: Maintain or return to baseline ADL function  Description: INTERVENTIONS:  -  Assess patient's ability to carry out ADLs; assess patient's baseline for ADL function and identify physical deficits which impact ability to perform ADLs (bathing, care of mouth/teeth, toileting, grooming, dressing, etc.)  - Assess/evaluate cause of self-care deficits   - Assess range of motion  - Assess patient's mobility; develop plan if impaired  - Assess patient's need for assistive devices and provide as appropriate  - Encourage maximum independence but intervene and supervise when necessary  - Involve family in performance of ADLs  - Assess for home care needs following discharge   - Consider OT consult to assist with ADL evaluation and planning for discharge  - Provide patient education as appropriate  Outcome: Progressing  Goal: Maintains/Returns to pre admission functional level  Description: INTERVENTIONS:  - Perform BMAT or MOVE assessment daily.   - Set and communicate daily mobility goal to care team and patient/family/caregiver. - Collaborate with rehabilitation services on mobility goals if consulted  - Perform Range of Motion  times a day. - Reposition patient every  hours.   - Dangle patient  times a day  - Stand patient  times a day  - Ambulate patient  times a day  - Out of bed to chair  times a day   - Out of bed for meals  times a day  - Out of bed for toileting  - Record patient progress and toleration of activity level   Outcome: Progressing  Goal: Patient will remain free of falls  Description: INTERVENTIONS:  - Educate patient/family on patient safety including physical limitations  - Instruct patient to call for assistance with activity   - Consult OT/PT to assist with strengthening/mobility   - Keep Call bell within reach  - Keep bed low and locked with side rails adjusted as appropriate  - Keep care items and personal belongings within reach  - Initiate and maintain comfort rounds  - Make Fall Risk Sign visible to staff  - Offer Toileting every  Hours, in advance of need  - Initiate/Maintain alarm  - Obtain necessary fall risk management equipment  - Apply yellow socks and bracelet for high fall risk patients  - Consider moving patient to room near nurses station  Outcome: Progressing     Problem: DISCHARGE PLANNING  Goal: Discharge to home or other facility with appropriate resources  Description: INTERVENTIONS:  - Identify barriers to discharge w/patient and caregiver  - Arrange for needed discharge resources and transportation as appropriate  - Identify discharge learning needs (meds, wound care, etc.)  - Arrange for interpretive services to assist at discharge as needed  - Refer to Case Management Department for coordinating discharge planning if the patient needs post-hospital services based on physician/advanced practitioner order or complex needs related to functional status, cognitive ability, or social support system  Outcome: Progressing     Problem: Knowledge Deficit  Goal: Patient/family/caregiver demonstrates understanding of disease process, treatment plan, medications, and discharge instructions  Description: Complete learning assessment and assess knowledge base.   Interventions:  - Provide teaching at level of understanding  - Provide teaching via preferred learning methods  Outcome: Progressing     Problem: GENITOURINARY - ADULT  Goal: Maintains or returns to baseline urinary function  Description: INTERVENTIONS:  - Assess urinary function  - Encourage oral fluids to ensure adequate hydration if ordered  - Administer IV fluids as ordered to ensure adequate hydration  - Administer ordered medications as needed  - Offer frequent toileting  - Follow urinary retention protocol if ordered  Outcome: Progressing  Goal: Absence of urinary retention  Description: INTERVENTIONS:  - Assess patient’s ability to void and empty bladder  - Monitor I/O  - Bladder scan as needed  - Discuss with physician/AP medications to alleviate retention as needed  - Discuss catheterization for long term situations as appropriate  Outcome: Progressing  Goal: Urinary catheter remains patent  Description: INTERVENTIONS:  - Assess patency of urinary catheter  - If patient has a chronic giles, consider changing catheter if non-functioning  - Follow guidelines for intermittent irrigation of non-functioning urinary catheter  Outcome: Progressing

## 2023-08-29 NOTE — ASSESSMENT & PLAN NOTE
Maintained on Adderall as an outpatient, will hold at this time as patient reports difficulty sleeping

## 2023-08-29 NOTE — PROGRESS NOTES
[facility]  Progress Note  Name: Greg Cunningham I  MRN: [id number]  Unit/Bed#: E4 -01 I Date of Admission: 8/27/2023   Date of Service: 8/29/2023 I Hospital Day: 2    Assessment/Plan   * Sepsis [facility])  Assessment & Plan  Presented with septic shock secondary to urinary tract infection in the setting of left hydronephrosis as well as left ureteral calculi  · Initially admitted to [facility] intensive care unit requiring Levophed via right IJ  · Levophed weaned off on 7/27 at 2 PM  · Blood cultures obtained growing gram-negative rods  · Repeat blood cultures ordered  · Urine cultures growing E. coli, Pseudomonas, and coag negative staph susceptible to Zosyn  · Initially started on Zosyn, Flagyl, and ampicillin  · Now continue solely Zosyn given sensitivities  · White blood cell count still markedly elevated at 26,000, will repeat CBC in a.m.     Gram-negative bacteremia  Assessment & Plan  Blood cultures growing E. coli, consistent with urine cultures  · Continue Zosyn while inpatient with switch to oral antibiotics on discharge  · Patient will require 14 days total of antibiotic therapy    Benign prostatic hyperplasia with urinary obstruction  Assessment & Plan  Status post UroLift secondary to refractory urinary retention  · Previous urodynamic study revealing small noncompliant bladder  · Has been performing self catheterizations for 1 year  · Continue Ordonez catheter once leukocytosis begins to downtrend  · Urology consulted, appreciate commendations  · follow-up in the outpatient setting    Diarrhea of presumed infectious origin  Assessment & Plan  Stool studies obtained, negative  · C. difficile negative    Hydronephrosis concurrent with and due to calculi of kidney and ureter  Assessment & Plan  Transferred to [address] [place] [address] secondary to hydronephrosis due to left ureteral calculi    ADD (attention deficit disorder)  Assessment & Plan  Maintained on Adderall as an outpatient, will hold at this time as patient reports difficulty sleeping    Hydronephrosis  Assessment & Plan  CT obtained on 8/27 with left-sided hydronephrosis secondary to a 1.7 x 1.1 x 0.8 cm proximal left ureteral calculi  · Urgently transferred to [facility] for left PCN placement by IR on 8/27  · Continue left PCN to straight drainage, flush per IR protocol  · Will need outpatient follow-up discussed definitive stone treatment    Stage 2 chronic kidney disease  Assessment & Plan  Lab Results   Component Value Date    EGFR 39 08/29/2023    EGFR 22 08/28/2023    EGFR 17 08/27/2023    CREATININE 1.70 (H) 08/29/2023    CREATININE 2.75 (H) 08/28/2023    CREATININE 3.44 (H) 08/27/2023   Creatinine back to baseline    Acute renal failure with tubular necrosis [facility])  Assessment & Plan  Presenting to the emergency department with a creatinine of 4.35  · Suspect likely multifactorial in the setting of hypoperfusion in the setting of septic shock as well as postrenal obstruction  · Avoid nephrotoxic medications  · Continue Ordonez catheter  · Continue IV antibiotics  · Creatinine outpatient baseline, 1.7         VTE Pharmacologic Prophylaxis:   Moderate Risk (Score 3-4) - Pharmacological DVT Prophylaxis Ordered: heparin. Patient Centered Rounds: I performed bedside rounds with nursing staff today. Discussions with Specialists or Other Care Team Provider: none    Education and Discussions with Family / Patient: Patient declined call to . Total Time Spent on Date of Encounter in care of patient: 45 minutes This time was spent on one or more of the following: performing physical exam; counseling and coordination of care; obtaining or reviewing history; documenting in the medical record; reviewing/ordering tests, medications or procedures; communicating with other healthcare professionals and discussing with patient's family/caregivers.     Current Length of Stay: 2 day(s)  Current Patient Status: Inpatient   Certification Statement: The patient will continue to require additional inpatient hospital stay due to IV antibiotics  Discharge Plan: Anticipate discharge in 48 hrs to home. Code Status: Level 1 - Full Code    Subjective:   Patient seen resting comfortably in bed. His current complaint is that he has not been able to get any sleep. He is requesting "Valium". He states that he just wants to be knocked out, he denies any chest pain or shortness of breath. He states he is feeling well overall if he was able to sleep he would feel much better. Objective:     Vitals:   Temp (24hrs), Av.8 °F (36.6 °C), Min:97.4 °F (36.3 °C), Max:98.2 °F (36.8 °C)    Temp:  [97.4 °F (36.3 °C)-98.2 °F (36.8 °C)] 98 °F (36.7 °C)  HR:  [57-74] 57  Resp:  [20-24] 20  BP: (109-134)/(69-86) 126/69  SpO2:  [92 %-97 %] 97 %  Body mass index is 25.84 kg/m². Input and Output Summary (last 24 hours): Intake/Output Summary (Last 24 hours) at 2023 1540  Last data filed at 2023 1513  Gross per 24 hour   Intake --   Output 4025 ml   Net -4025 ml       Physical Exam:   Physical Exam  Vitals and nursing note reviewed. Constitutional:       General: He is not in acute distress. Appearance: Normal appearance. He is not ill-appearing, toxic-appearing or diaphoretic. HENT:      Head: Normocephalic and atraumatic. Cardiovascular:      Rate and Rhythm: Normal rate and regular rhythm. Heart sounds: No murmur heard. No friction rub. No gallop. Pulmonary:      Effort: Pulmonary effort is normal. No respiratory distress. Breath sounds: Normal breath sounds. No wheezing, rhonchi or rales. Abdominal:      General: Abdomen is flat. Bowel sounds are normal. There is no distension. Palpations: Abdomen is soft. Tenderness: There is no abdominal tenderness. Genitourinary:     Comments: Ordonez catheter in place draining clear yellow urine.   Left PCN with clear yellow urine  Musculoskeletal:      Right lower leg: No edema. Left lower leg: No edema. Skin:     General: Skin is warm and dry. Coloration: Skin is not jaundiced or pale. Neurological:      General: No focal deficit present. Mental Status: He is alert. Mental status is at baseline. Additional Data:     Labs:  Results from last 7 days   Lab Units 08/29/23 0443 08/27/23  0924 08/27/23  0328   WBC Thousand/uL 26.00*   < > 31.47*   HEMOGLOBIN g/dL 12.4   < > 15.8   HEMATOCRIT % 36.9   < > 47.7   PLATELETS Thousands/uL 55*   < > 176   BANDS PCT %  --   --  9*   LYMPHO PCT %  --   --  2*   MONO PCT %  --   --  7   EOS PCT %  --   --  0    < > = values in this interval not displayed. Results from last 7 days   Lab Units 08/29/23 0443 08/28/23  0444 08/27/23  0952   SODIUM mmol/L 136   < > 139   POTASSIUM mmol/L 3.5   < > 3.0*   CHLORIDE mmol/L 103   < > 101   CO2 mmol/L 25   < > 26   BUN mg/dL 37*   < > 40*   CREATININE mg/dL 1.70*   < > 3.44*   ANION GAP mmol/L 8   < > 12   CALCIUM mg/dL 7.6*   < > 7.2*   ALBUMIN g/dL  --   --  2.9*   TOTAL BILIRUBIN mg/dL  --   --  0.41   ALK PHOS U/L  --   --  48   ALT U/L  --   --  16   AST U/L  --   --  35   GLUCOSE RANDOM mg/dL 123   < > 111    < > = values in this interval not displayed. Results from last 7 days   Lab Units 08/27/23  0641   INR  1.77*             Results from last 7 days   Lab Units 08/28/23  0445 08/27/23  1215 08/27/23  0952 08/27/23  0641   LACTIC ACID mmol/L 2.9* 3.8* 2.8* 5.6*       Lines/Drains:  Invasive Devices     Peripheral Intravenous Line  Duration           Peripheral IV 08/27/23 Right Antecubital 2 days          Drain  Duration           Nephrostomy Left 10 Fr. 2 days    Urethral Catheter Coude 16 Fr. 2 days              Urinary Catheter:  Goal for removal: Remove after 48 hrs of I/O monitoring               Imaging: No pertinent imaging reviewed.     Recent Cultures (last 7 days):   Results from last 7 days   Lab Units 08/28/23  1051 08/28/23  0831 08/27/23  1108 08/27/23  0735 08/27/23  0413   BLOOD CULTURE  Received in Microbiology Lab. Culture in Progress.   --   --   --  Gram Negative Obi Enteric Like*  Escherichia coli*   GRAM STAIN RESULT   --   --  3+ Polys*  3+ Gram positive cocci in clusters*  3+ Gram negative rods*  --  Gram negative rods*  Gram negative rods*   URINE CULTURE   --   --   --  >100,000 cfu/ml Escherichia coli*  50,000-59,000 cfu/ml Pseudomonas aeruginosa*  8004-9763 cfu/ml Staphylococcus coagulase negative*  --    BODY FLUID CULTURE, STERILE   --   --  4+ Growth of Escherichia coli*  3+ Growth of Pseudomonas aeruginosa*  4+ Growth of Enterococcus faecalis*  --   --    C DIFF TOXIN B BY PCR   --  Negative  --   --  Negative       Last 24 Hours Medication List:   Current Facility-Administered Medications   Medication Dose Route Frequency Provider Last Rate   • aluminum-magnesium hydroxide-simethicone  30 mL Oral Q4H PRN Carol Dooley MD     • amphetamine-dextroamphetamine  20 mg Oral BID (AM & Afternoon) Juno Barnes MD     • heparin (porcine)  5,000 Units Subcutaneous [facility] Son Medrano MD     • HYDROcodone-acetaminophen  1 tablet Oral Q6H PRN Juno Barnes MD     • melatonin  3 mg Oral HS Jonas Medrano PA-C     • ondansetron  4 mg Intravenous Q6H PRN Anusha Leaks, DO     • pantoprazole  40 mg Oral Early Morning Juno Barnes MD     • piperacillin-tazobactam  3.375 g Intravenous Q8H Son Medrano MD     • temazepam  15 mg Oral Daily PRN Inna Ramirez PA-C       Facility-Administered Medications Ordered in Other Encounters   Medication Dose Route Frequency Provider Last Rate   • oxyCODONE-acetaminophen  1 tablet Oral Once Renae Perez DO          Today, Patient Was Seen By: Inna Ramirez PA-C    **Please Note: This note may have been constructed using a voice recognition system. **

## 2023-08-29 NOTE — ASSESSMENT & PLAN NOTE
Presenting to the emergency department with a creatinine of 4.35  · Suspect likely multifactorial in the setting of hypoperfusion in the setting of septic shock as well as postrenal obstruction  · Avoid nephrotoxic medications  · Continue Ordonez catheter  · Continue IV antibiotics  · Creatinine outpatient baseline, 1.7

## 2023-08-29 NOTE — ASSESSMENT & PLAN NOTE
Blood cultures growing E. coli, consistent with urine cultures  · Continue Zosyn while inpatient with switch to oral antibiotics on discharge  · Patient will require 14 days total of antibiotic therapy

## 2023-08-29 NOTE — TELEPHONE ENCOUNTER
Kp Alva is a 26-year-old male with history of neurogenic BPH, status post UroLift, refractory urinary retention, home CIC, nephrolithiasis admitted to [facility] for emergent left PCN due to large left obstructing proximal stone ureteral stone and sepsis. He will require outpatient discussion for ureteroscopy in addition to potential cystoscopy litholapaxy given imaging consistent with multiple bladder calculi. Please contact patient for AP visit to discuss surgical plan and scheduling. Should be seen within 3 weeks. Thank you.

## 2023-08-29 NOTE — PLAN OF CARE
Problem: PAIN - ADULT  Goal: Verbalizes/displays adequate comfort level or baseline comfort level  Description: Interventions:  - Encourage patient to monitor pain and request assistance  - Assess pain using appropriate pain scale  - Administer analgesics based on type and severity of pain and evaluate response  - Implement non-pharmacological measures as appropriate and evaluate response  - Consider cultural and social influences on pain and pain management  - Notify physician/advanced practitioner if interventions unsuccessful or patient reports new pain  Outcome: Progressing     Problem: INFECTION - ADULT  Goal: Absence or prevention of progression during hospitalization  Description: INTERVENTIONS:  - Assess and monitor for signs and symptoms of infection  - Monitor lab/diagnostic results  - Monitor all insertion sites, i.e. indwelling lines, tubes, and drains  - Monitor endotracheal if appropriate and nasal secretions for changes in amount and color  - [place] appropriate cooling/warming therapies per order  - Administer medications as ordered  - Instruct and encourage patient and family to use good hand hygiene technique  - Identify and instruct in appropriate isolation precautions for identified infection/condition  Outcome: Progressing  Goal: Absence of fever/infection during neutropenic period  Description: INTERVENTIONS:  - Monitor WBC    Outcome: Progressing

## 2023-08-29 NOTE — ASSESSMENT & PLAN NOTE
Status post UroLift secondary to refractory urinary retention  · Previous urodynamic study revealing small noncompliant bladder  · Has been performing self catheterizations for 1 year  · Continue Ordonez catheter once leukocytosis begins to downtrend  · Urology consulted, appreciate commendations  · follow-up in the outpatient setting

## 2023-08-29 NOTE — ASSESSMENT & PLAN NOTE
Presented with septic shock secondary to urinary tract infection in the setting of left hydronephrosis as well as left ureteral calculi  · Initially admitted to [facility] intensive care unit requiring Levophed via right IJ  · Levophed weaned off on 7/27 at 2 PM  · Blood cultures obtained growing gram-negative rods  · Repeat blood cultures ordered  · Urine cultures growing E. coli, Pseudomonas, and coag negative staph susceptible to Zosyn  · Initially started on Zosyn, Flagyl, and ampicillin  · Now continue solely Zosyn given sensitivities  · White blood cell count still markedly elevated at 26,000, will repeat CBC in a.m.

## 2023-08-29 NOTE — ASSESSMENT & PLAN NOTE
CT obtained on 8/27 with left-sided hydronephrosis secondary to a 1.7 x 1.1 x 0.8 cm proximal left ureteral calculi  · Urgently transferred to [facility] for left PCN placement by IR on 8/27  · Continue left PCN to straight drainage, flush per IR protocol  · Will need outpatient follow-up discussed definitive stone treatment

## 2023-08-29 NOTE — ASSESSMENT & PLAN NOTE
Lab Results   Component Value Date    EGFR 39 08/29/2023    EGFR 22 08/28/2023    EGFR 17 08/27/2023    CREATININE 1.70 (H) 08/29/2023    CREATININE 2.75 (H) 08/28/2023    CREATININE 3.44 (H) 08/27/2023   Creatinine back to baseline

## 2023-08-29 NOTE — ASSESSMENT & PLAN NOTE
Transferred to [address] [facility] secondary to hydronephrosis due to left ureteral calculi

## 2023-08-30 VITALS
HEART RATE: 60 BPM | DIASTOLIC BLOOD PRESSURE: 56 MMHG | OXYGEN SATURATION: 96 % | SYSTOLIC BLOOD PRESSURE: 92 MMHG | RESPIRATION RATE: 20 BRPM | HEIGHT: 68 IN | WEIGHT: 169.97 LBS | BODY MASS INDEX: 25.76 KG/M2 | TEMPERATURE: 97.6 F

## 2023-08-30 PROBLEM — A41.9 SEPSIS (HCC): Status: RESOLVED | Noted: 2023-08-27 | Resolved: 2023-08-30

## 2023-08-30 PROBLEM — R19.7 DIARRHEA OF PRESUMED INFECTIOUS ORIGIN: Status: RESOLVED | Noted: 2023-08-27 | Resolved: 2023-08-30

## 2023-08-30 PROBLEM — N17.0 ACUTE RENAL FAILURE WITH TUBULAR NECROSIS (HCC): Status: RESOLVED | Noted: 2021-02-26 | Resolved: 2023-08-30

## 2023-08-30 LAB
ANION GAP SERPL CALCULATED.3IONS-SCNC: 7 MMOL/L
BACTERIA BLD CULT: ABNORMAL
BACTERIA BLD CULT: ABNORMAL
BACTERIA SPEC BFLD CULT: ABNORMAL
BUN SERPL-MCNC: 30 MG/DL (ref 5–25)
CALCIUM SERPL-MCNC: 7.9 MG/DL (ref 8.4–10.2)
CHLORIDE SERPL-SCNC: 107 MMOL/L (ref 96–108)
CO2 SERPL-SCNC: 24 MMOL/L (ref 21–32)
CREAT SERPL-MCNC: 1.33 MG/DL (ref 0.6–1.3)
E COLI DNA BLD POS QL NAA+NON-PROBE: DETECTED
ERYTHROCYTE [DISTWIDTH] IN BLOOD BY AUTOMATED COUNT: 14.3 % (ref 11.6–15.1)
GFR SERPL CREATININE-BSD FRML MDRD: 53 ML/MIN/1.73SQ M
GLUCOSE SERPL-MCNC: 115 MG/DL (ref 65–140)
GRAM STN SPEC: ABNORMAL
HCT VFR BLD AUTO: 37.6 % (ref 36.5–49.3)
HGB BLD-MCNC: 12.4 G/DL (ref 12–17)
MCH RBC QN AUTO: 29.2 PG (ref 26.8–34.3)
MCHC RBC AUTO-ENTMCNC: 33 G/DL (ref 31.4–37.4)
MCV RBC AUTO: 89 FL (ref 82–98)
PLATELET # BLD AUTO: 67 THOUSANDS/UL (ref 149–390)
PMV BLD AUTO: 12 FL (ref 8.9–12.7)
POTASSIUM SERPL-SCNC: 3.6 MMOL/L (ref 3.5–5.3)
RBC # BLD AUTO: 4.25 MILLION/UL (ref 3.88–5.62)
SODIUM SERPL-SCNC: 138 MMOL/L (ref 135–147)
WBC # BLD AUTO: 18.16 THOUSAND/UL (ref 4.31–10.16)
YERSINIA STL CULT: NORMAL

## 2023-08-30 PROCEDURE — 85027 COMPLETE CBC AUTOMATED: CPT | Performed by: PHYSICIAN ASSISTANT

## 2023-08-30 PROCEDURE — 80048 BASIC METABOLIC PNL TOTAL CA: CPT | Performed by: PHYSICIAN ASSISTANT

## 2023-08-30 PROCEDURE — 99239 HOSP IP/OBS DSCHRG MGMT >30: CPT | Performed by: PHYSICIAN ASSISTANT

## 2023-08-30 RX ORDER — AMOXICILLIN 500 MG/1
1000 CAPSULE ORAL EVERY 12 HOURS SCHEDULED
Qty: 40 CAPSULE | Refills: 0 | Status: SHIPPED | OUTPATIENT
Start: 2023-08-30 | End: 2023-09-09

## 2023-08-30 RX ORDER — DEXTROAMPHETAMINE SACCHARATE, AMPHETAMINE ASPARTATE, DEXTROAMPHETAMINE SULFATE AND AMPHETAMINE SULFATE 2.5; 2.5; 2.5; 2.5 MG/1; MG/1; MG/1; MG/1
20 TABLET ORAL
Status: DISCONTINUED | OUTPATIENT
Start: 2023-08-30 | End: 2023-08-30 | Stop reason: HOSPADM

## 2023-08-30 RX ADMIN — PIPERACILLIN SODIUM AND TAZOBACTAM SODIUM 3.38 G: 36; 4.5 INJECTION, POWDER, LYOPHILIZED, FOR SOLUTION INTRAVENOUS at 01:04

## 2023-08-30 RX ADMIN — PANTOPRAZOLE SODIUM 40 MG: 40 TABLET, DELAYED RELEASE ORAL at 05:32

## 2023-08-30 RX ADMIN — DEXTROAMPHETAMINE SACCHARATE, AMPHETAMINE ASPARTATE, DEXTROAMPHETAMINE SULFATE AND AMPHETAMINE SULFATE 20 MG: 2.5; 2.5; 2.5; 2.5 TABLET ORAL at 07:46

## 2023-08-30 NOTE — DISCHARGE SUMMARY
[facility]  Discharge- Duehansel Risen 1955, 77 y.o. male MRN: [id number]  Unit/Bed#: E4 -01 Encounter: [id number]  Primary Care Provider: Marylin Leonard MD   Date and time admitted to hospital: 8/27/2023  8:20 AM    * Sepsis (HCC)-resolved as of 8/30/2023  Assessment & Plan  Presented with septic shock secondary to urinary tract infection in the setting of left hydronephrosis as well as left ureteral calculi  · Initially admitted to [facility] intensive care unit requiring Levophed via right IJ  · Levophed weaned off on 7/27 at 2 PM  · Blood cultures obtained growing gram-negative rods  · Repeat blood cultures negative  · Urine cultures growing E. coli, Pseudomonas, and coag negative staph susceptible to Zosyn  · Initially started on Zosyn, Flagyl, and ampicillin  · Received 4 days of IV Zosyn, will discharge on additional 10 days of amoxicillin for a full 14 days of antibiotic therapy    Gram-negative bacteremia  Assessment & Plan  Blood cultures growing E. coli, consistent with urine cultures  · Continue Zosyn while inpatient with switch to oral antibiotics on discharge  · Discharged on an additional 10 days of amoxicillin, patient received 4 days of IV Zosyn while inpatient  · Will complete a total of 14 days of antibiotic therapy    Benign prostatic hyperplasia with urinary obstruction  Assessment & Plan  Status post UroLift secondary to refractory urinary retention  · Previous urodynamic study revealing small noncompliant bladder  · Has been performing self catheterizations for 1 year  · Ordonez catheter discontinued this morning as leukocytosis has began to improve  · Urology consulted, appreciate commendations  · follow-up in the outpatient setting    Hydronephrosis concurrent with and due to calculi of kidney and ureter  Assessment & Plan  Transferred to [address] [place] [address] secondary to hydronephrosis due to left ureteral calculi  · Follow-up in the outpatient setting with urology    ADD (attention deficit disorder)  Assessment & Plan  Maintained on Adderall as an outpatient, on hold during hospitalization    Hydronephrosis  Assessment & Plan  CT obtained on 8/27 with left-sided hydronephrosis secondary to a 1.7 x 1.1 x 0.8 cm proximal left ureteral calculi  · Urgently transferred to [facility] for left PCN placement by IR on 8/27  · Continue left PCN to straight drainage, flush per IR protocol  · Flush once daily  · Follow-up in the outpatient setting with IR in 2 months  · Will need outpatient follow-up discussed definitive stone treatment    Stage 2 chronic kidney disease  Assessment & Plan  Lab Results   Component Value Date    EGFR 53 08/30/2023    EGFR 39 08/29/2023    EGFR 22 08/28/2023    CREATININE 1.33 (H) 08/30/2023    CREATININE 1.70 (H) 08/29/2023    CREATININE 2.75 (H) 08/28/2023   Creatinine back to baseline    Diarrhea of presumed infectious origin-resolved as of 8/30/2023  Assessment & Plan  Stool studies obtained, negative  · C. difficile negative      Medical Problems     Resolved Problems  Date Reviewed: 8/30/2023          Resolved    Acute renal failure with tubular necrosis ([address]) 8/30/2023     Resolved by  Mery Campuzano PA-C    Diarrhea of presumed infectious origin 8/30/2023     Resolved by  Mery Campuzano PA-C    * (Principal) Sepsis ([address]) 8/30/2023     Resolved by  Mery Campuzano PA-C    Overview Deleted 8/27/2023  9:33 AM by QUIANA Diez                Discharging Physician / Practitioner: Mery Campuzano PA-C  PCP: Cathy Yanes MD  Admission Date:   Admission Orders (From admission, onward)     Ordered        08/27/23 0843  Inpatient Admission  Once                      Discharge Date: 08/30/23    Consultations During Hospital Stay:  · Interventional radiology  · Urology    Procedures Performed:   IR nephrostomy tube placement    Result Date: 8/27/2023    Impression: Impression: Successful ultrasound and fluoroscopically guided placement of a 10 [place] percutaneous left nephrostomy tube due to marked hydronephrosis from a proximal ureteral partially obstructing calculus. PLAN: Tube to bag drainage. Flush with 10 cc normal saline solution every 8 hours in the hospital and q. day at home. Return in 2 months for a tube check unless the patient has gone to surgery by that time. Patient to follow-up with Dr. Migue Marques. Workstation performed: [id number]     Significant Findings / Test Results:   XR chest 1 view portable  Result Date: 8/27/2023    Impression: No acute cardiopulmonary disease. Right jugular catheter in upper SVC with no pneumothorax. Workstation performed: [id number]     CT abdomen pelvis wo contrast  Result Date: 8/27/2023    Impression: Marked left hydronephrosis secondary to a 1.7 x 1.1 x 0.8 cm stone at the proximal left ureter at the level of the L3-L4 disc space. Findings consistent with enterocolitis. Workstation performed: [id number]     Incidental Findings:   · none     Test Results Pending at Discharge (will require follow up):   · none     Outpatient Tests Requested:  · none    Complications:  none    Reason for Admission: sepsis    Hospital Course:   Dm Banks is a 77 y.o. male patient with past medical history of BPH, CKD, ADD, depression, urinary retention who originally presented to the hospital on 8/27/2023 due to intractable diarrhea as well as urinary retention. The patient initially presented to [address] [place] [address] and CT scan there revealed severe left hydronephrosis due to a large calculus at the proximal left ureter. At that time, the patient was meeting criteria for septic shock, a central line was placed, and Levophed was initiated. He was then promptly transferred to [facility] for emergent interventional radiology PCN placement. He remained in the intensive care unit for 1 day and his Levophed was weaned.   He was then transferred to the medical/surgical floor.    Urine culture as well as initial blood cultures were consistent with showing E. coli. He was initiated on IV Zosyn and received 4 days total of IV antibiotic therapy. Repeat blood cultures were obtained which were negative at 24 hours. Ultimately, the patient was discharged in stable condition on an additional 10 days of antibiotic therapy to complete a full 14 days of antibiotic therapy for gram-negative bacteremia in the setting of UTI. He will follow-up in the outpatient setting with both IR as well as urology    Please see above list of diagnoses and related plan for additional information. Condition at Discharge: stable    Discharge Day Visit / Exam:   Subjective: The patient is seen resting comfortably in bed. He is very eager to go home, he denies any nausea or vomiting, diarrhea or constipation. States that his diarrhea has resolved. Denies any fevers or chills overnight. Vitals: Blood Pressure: 92/56 (08/30/23 0739)  Pulse: 60 (08/30/23 0739)  Temperature: 97.6 °F (36.4 °C) (08/30/23 0739)  Temp Source: Temporal (08/30/23 0739)  Respirations: 20 (08/30/23 0739)  Height: 5' 8" (172.7 cm) (08/27/23 0900)  Weight - Scale: 77.1 kg (169 lb 15.6 oz) (08/29/23 0600)  SpO2: 96 % (08/30/23 0739)  Exam:   Physical Exam  Vitals and nursing note reviewed. Constitutional:       General: He is not in acute distress. Appearance: Normal appearance. He is not ill-appearing, toxic-appearing or diaphoretic. HENT:      Head: Normocephalic and atraumatic. Cardiovascular:      Rate and Rhythm: Normal rate and regular rhythm. Heart sounds: No murmur heard. No friction rub. No gallop. Pulmonary:      Effort: Pulmonary effort is normal. No respiratory distress. Breath sounds: Normal breath sounds. No wheezing, rhonchi or rales. Abdominal:      General: Abdomen is flat. Bowel sounds are normal. There is no distension. Palpations: Abdomen is soft. Tenderness:  There is no abdominal tenderness. Musculoskeletal:      Right lower leg: No edema. Left lower leg: No edema. Skin:     General: Skin is warm and dry. Coloration: Skin is not jaundiced or pale. Neurological:      General: No focal deficit present. Mental Status: He is alert. Mental status is at baseline. Discussion with Family: Patient declined call to . Discharge instructions/Information to patient and family:   See after visit summary for information provided to patient and family. Provisions for Follow-Up Care:  See after visit summary for information related to follow-up care and any pertinent home health orders. Disposition:   Home    Planned Readmission: n/a     Discharge Statement:  I spent 50 minutes discharging the patient. This time was spent on the day of discharge. I had direct contact with the patient on the day of discharge. Greater than 50% of the total time was spent examining patient, answering all patient questions, arranging and discussing plan of care with patient as well as directly providing post-discharge instructions. Additional time then spent on discharge activities. Discharge Medications:  See after visit summary for reconciled discharge medications provided to patient and/or family.       **Please Note: This note may have been constructed using a voice recognition system**

## 2023-08-30 NOTE — DISCHARGE INSTR - AVS FIRST PAGE
Dear Yelitza Stephenson,     It was our pleasure to care for you here at [facility]. It is our hope that we were always able to exceed the expected standards for your care during your stay. You were hospitalized due to sepsis from a kidney stone and urine infection . You were cared for on the 4th floor under the service of Juanpablo Stiles PA-C with the [facility] who covers for your primary care physician (PCP), Chelsey Lara MD, while you were hospitalized. If you have any questions or concerns related to this hospitalization, you may contact us at [phone]. For follow up as well as medication refills, we recommend that you follow up with your primary care physician. A registered nurse will reach out to you by phone within a few days after your discharge to answer any additional questions that you may have after going home. However, at this time we provide for you here, the most important instructions / recommendations at discharge:     Notable Medication Adjustments -   Take amoxicillin 1000mg BID for 10 days  Testing Required after Discharge -   None  Incidental findings that Require Follow Up   None  Important Follow Up Information -   Follow up with the urology team in 1-2 weeks to discuss difinitive stone treatment  Follow up with IR in 2 months for PCN tube check and possible removal  Other Instructions -   None  Please review this entire after visit summary as additional general instructions including medication list, appointments, activity, diet, any pertinent wound care, and other additional recommendations from your care team that may be provided for you.       Sincerely,     Juanpablo Stiles PA-C

## 2023-08-30 NOTE — ASSESSMENT & PLAN NOTE
Presented with septic shock secondary to urinary tract infection in the setting of left hydronephrosis as well as left ureteral calculi  · Initially admitted to [facility] intensive care unit requiring Levophed via right IJ  · Levophed weaned off on 7/27 at 2 PM  · Blood cultures obtained growing gram-negative rods  · Repeat blood cultures negative  · Urine cultures growing E. coli, Pseudomonas, and coag negative staph susceptible to Zosyn  · Initially started on Zosyn, Flagyl, and ampicillin  · Received 4 days of IV Zosyn, will discharge on additional 10 days of amoxicillin for a full 14 days of antibiotic therapy

## 2023-08-30 NOTE — PLAN OF CARE
Problem: MOBILITY - ADULT  Goal: Maintain or return to baseline ADL function  Description: INTERVENTIONS:  -  Assess patient's ability to carry out ADLs; assess patient's baseline for ADL function and identify physical deficits which impact ability to perform ADLs (bathing, care of mouth/teeth, toileting, grooming, dressing, etc.)  - Assess/evaluate cause of self-care deficits   - Assess range of motion  - Assess patient's mobility; develop plan if impaired  - Assess patient's need for assistive devices and provide as appropriate  - Encourage maximum independence but intervene and supervise when necessary  - Involve family in performance of ADLs  - Assess for home care needs following discharge   - Consider OT consult to assist with ADL evaluation and planning for discharge  - Provide patient education as appropriate  Outcome: Progressing  Goal: Maintains/Returns to pre admission functional level  Description: INTERVENTIONS:  - Perform BMAT or MOVE assessment daily.   - Set and communicate daily mobility goal to care team and patient/family/caregiver. - Collaborate with rehabilitation services on mobility goals if consulted  - Perform Range of Motion  times a day. - Reposition patient every  hours.   - Dangle patient  times a day  - Stand patient  times a day  - Ambulate patient  times a day  - Out of bed to chair  times a day   - Out of bed for meals  times a day  - Out of bed for toileting  - Record patient progress and toleration of activity level   Outcome: Progressing     Problem: Prexisting or High Potential for Compromised Skin Integrity  Goal: Skin integrity is maintained or improved  Description: INTERVENTIONS:  - Identify patients at risk for skin breakdown  - Assess and monitor skin integrity  - Assess and monitor nutrition and hydration status  - Monitor labs   - Assess for incontinence   - Turn and reposition patient  - Assist with mobility/ambulation  - Relieve pressure over bony prominences  - Avoid friction and shearing  - Provide appropriate hygiene as needed including keeping skin clean and dry  - Evaluate need for skin moisturizer/barrier cream  - Collaborate with interdisciplinary team   - Patient/family teaching  - Consider wound care consult   Outcome: Progressing     Problem: PAIN - ADULT  Goal: Verbalizes/displays adequate comfort level or baseline comfort level  Description: Interventions:  - Encourage patient to monitor pain and request assistance  - Assess pain using appropriate pain scale  - Administer analgesics based on type and severity of pain and evaluate response  - Implement non-pharmacological measures as appropriate and evaluate response  - Consider cultural and social influences on pain and pain management  - Notify physician/advanced practitioner if interventions unsuccessful or patient reports new pain  Outcome: Progressing     Problem: INFECTION - ADULT  Goal: Absence or prevention of progression during hospitalization  Description: INTERVENTIONS:  - Assess and monitor for signs and symptoms of infection  - Monitor lab/diagnostic results  - Monitor all insertion sites, i.e. indwelling lines, tubes, and drains  - Monitor endotracheal if appropriate and nasal secretions for changes in amount and color  - [place] appropriate cooling/warming therapies per order  - Administer medications as ordered  - Instruct and encourage patient and family to use good hand hygiene technique  - Identify and instruct in appropriate isolation precautions for identified infection/condition  Outcome: Progressing  Goal: Absence of fever/infection during neutropenic period  Description: INTERVENTIONS:  - Monitor WBC    Outcome: Progressing     Problem: SAFETY ADULT  Goal: Maintain or return to baseline ADL function  Description: INTERVENTIONS:  -  Assess patient's ability to carry out ADLs; assess patient's baseline for ADL function and identify physical deficits which impact ability to perform ADLs (bathing, care of mouth/teeth, toileting, grooming, dressing, etc.)  - Assess/evaluate cause of self-care deficits   - Assess range of motion  - Assess patient's mobility; develop plan if impaired  - Assess patient's need for assistive devices and provide as appropriate  - Encourage maximum independence but intervene and supervise when necessary  - Involve family in performance of ADLs  - Assess for home care needs following discharge   - Consider OT consult to assist with ADL evaluation and planning for discharge  - Provide patient education as appropriate  Outcome: Progressing  Goal: Maintains/Returns to pre admission functional level  Description: INTERVENTIONS:  - Perform BMAT or MOVE assessment daily.   - Set and communicate daily mobility goal to care team and patient/family/caregiver. - Collaborate with rehabilitation services on mobility goals if consulted  - Perform Range of Motion  times a day. - Reposition patient every  hours.   - Dangle patient  times a day  - Stand patient  times a day  - Ambulate patient  times a day  - Out of bed to chair  times a day   - Out of bed for meals  times a day  - Out of bed for toileting  - Record patient progress and toleration of activity level   Outcome: Progressing  Goal: Patient will remain free of falls  Description: INTERVENTIONS:  - Educate patient/family on patient safety including physical limitations  - Instruct patient to call for assistance with activity   - Consult OT/PT to assist with strengthening/mobility   - Keep Call bell within reach  - Keep bed low and locked with side rails adjusted as appropriate  - Keep care items and personal belongings within reach  - Initiate and maintain comfort rounds  - Make Fall Risk Sign visible to staff  - Offer Toileting every  Hours, in advance of need  - Initiate/Maintain alarm  - Obtain necessary fall risk management equipment  - Apply yellow socks and bracelet for high fall risk patients  - Consider moving patient to room near nurses station  Outcome: Progressing     Problem: DISCHARGE PLANNING  Goal: Discharge to home or other facility with appropriate resources  Description: INTERVENTIONS:  - Identify barriers to discharge w/patient and caregiver  - Arrange for needed discharge resources and transportation as appropriate  - Identify discharge learning needs (meds, wound care, etc.)  - Arrange for interpretive services to assist at discharge as needed  - Refer to Case Management Department for coordinating discharge planning if the patient needs post-hospital services based on physician/advanced practitioner order or complex needs related to functional status, cognitive ability, or social support system  Outcome: Progressing     Problem: Knowledge Deficit  Goal: Patient/family/caregiver demonstrates understanding of disease process, treatment plan, medications, and discharge instructions  Description: Complete learning assessment and assess knowledge base.   Interventions:  - Provide teaching at level of understanding  - Provide teaching via preferred learning methods  Outcome: Progressing     Problem: GENITOURINARY - ADULT  Goal: Maintains or returns to baseline urinary function  Description: INTERVENTIONS:  - Assess urinary function  - Encourage oral fluids to ensure adequate hydration if ordered  - Administer IV fluids as ordered to ensure adequate hydration  - Administer ordered medications as needed  - Offer frequent toileting  - Follow urinary retention protocol if ordered  Outcome: Progressing  Goal: Absence of urinary retention  Description: INTERVENTIONS:  - Assess patient’s ability to void and empty bladder  - Monitor I/O  - Bladder scan as needed  - Discuss with physician/AP medications to alleviate retention as needed  - Discuss catheterization for long term situations as appropriate  Outcome: Progressing  Goal: Urinary catheter remains patent  Description: INTERVENTIONS:  - Assess patency of urinary catheter  - If patient has a chronic giles, consider changing catheter if non-functioning  - Follow guidelines for intermittent irrigation of non-functioning urinary catheter  Outcome: Progressing

## 2023-08-30 NOTE — ASSESSMENT & PLAN NOTE
Lab Results   Component Value Date    EGFR 53 08/30/2023    EGFR 39 08/29/2023    EGFR 22 08/28/2023    CREATININE 1.33 (H) 08/30/2023    CREATININE 1.70 (H) 08/29/2023    CREATININE 2.75 (H) 08/28/2023   Creatinine back to baseline

## 2023-08-30 NOTE — PLAN OF CARE
Problem: PAIN - ADULT  Goal: Verbalizes/displays adequate comfort level or baseline comfort level  Description: Interventions:  - Encourage patient to monitor pain and request assistance  - Assess pain using appropriate pain scale  - Administer analgesics based on type and severity of pain and evaluate response  - Implement non-pharmacological measures as appropriate and evaluate response  - Consider cultural and social influences on pain and pain management  - Notify physician/advanced practitioner if interventions unsuccessful or patient reports new pain  Outcome: Progressing     Problem: INFECTION - ADULT  Goal: Absence or prevention of progression during hospitalization  Description: INTERVENTIONS:  - Assess and monitor for signs and symptoms of infection  - Monitor lab/diagnostic results  - Monitor all insertion sites, i.e. indwelling lines, tubes, and drains  - Monitor endotracheal if appropriate and nasal secretions for changes in amount and color  - [place] appropriate cooling/warming therapies per order  - Administer medications as ordered  - Instruct and encourage patient and family to use good hand hygiene technique  - Identify and instruct in appropriate isolation precautions for identified infection/condition  Outcome: Progressing  Goal: Absence of fever/infection during neutropenic period  Description: INTERVENTIONS:  - Monitor WBC    Outcome: Progressing

## 2023-08-30 NOTE — ASSESSMENT & PLAN NOTE
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

## 2023-08-30 NOTE — ASSESSMENT & PLAN NOTE
Transferred to [facility] secondary to hydronephrosis due to left ureteral calculi  · Follow-up in the outpatient setting with urology

## 2023-08-30 NOTE — ASSESSMENT & PLAN NOTE
CT obtained on 8/27 with left-sided hydronephrosis secondary to a 1.7 x 1.1 x 0.8 cm proximal left ureteral calculi  · Urgently transferred to [facility] for left PCN placement by IR on 8/27  · Continue left PCN to straight drainage, flush per IR protocol  · Flush once daily  · Follow-up in the outpatient setting with IR in 2 months  · Will need outpatient follow-up discussed definitive stone treatment

## 2023-08-30 NOTE — CASE MANAGEMENT
Case Management Discharge Planning Note    Patient name Adolfo Morales  Location [facility] [place] [facility] [place]  /E4 [address] [facility] [place] 54-* MRN [id number]  : 1955 Date 2023       Current Admission Date: 2023  Current Admission Diagnosis:Sepsis [facility])   Patient Active Problem List    Diagnosis Date Noted   • Gram-negative bacteremia 2023   • Benign prostatic hyperplasia with urinary obstruction 2023   • Hydronephrosis concurrent with and due to calculi of kidney and ureter 2023   • Diarrhea of presumed infectious origin 2023   • Sepsis ([address]) 2023   • Depression    • Dental crowns present    • ADD (attention deficit disorder)    • Umbilical hernia without obstruction and without gangrene 2021   • Incarcerated right inguinal hernia 2021   • Hydronephrosis 2021   • Persistent proteinuria 2021   • Acute renal failure with tubular necrosis ([address]) 2021   • Stage 2 chronic kidney disease 2021      LOS (days): 3  Geometric Mean LOS (GMLOS) (days): 5.00  Days to GMLOS:1.9     OBJECTIVE:  Risk of Unplanned Readmission Score: 14.04         Current admission status: Inpatient   Preferred Pharmacy:   [address] [place] [address]. [address]  [address] [facility] [address]  Phone: [phone] Fax: [phone]    Primary Care Provider: Tomas Mo MD    Primary Insurance: MEDICARE  Secondary Insurance: [employer] DETAILS:                                                                                        IMM Given (Date):: 23 (Pt with no plan to appeal d/c.)  IMM Given to[de-identified] Patient (Pt refused copy of IMM signature page.  Original placed in scan bin.)

## 2023-08-30 NOTE — ASSESSMENT & PLAN NOTE
Blood cultures growing E. coli, consistent with urine cultures  · Continue Zosyn while inpatient with switch to oral antibiotics on discharge  · Discharged on an additional 10 days of amoxicillin, patient received 4 days of IV Zosyn while inpatient  · Will complete a total of 14 days of antibiotic therapy

## 2023-09-02 LAB
BACTERIA BLD CULT: ABNORMAL
GRAM STN SPEC: ABNORMAL

## 2023-09-06 NOTE — TELEPHONE ENCOUNTER
Patient calling in to speak with member of clinical staff regarding his ER visit on 8/27/23. Call was dropped.

## 2023-09-07 NOTE — TELEPHONE ENCOUNTER
Patient is scheduled with Angel Hahn PA-C for tomorrow, there were no open slots in the 3 week period.

## 2023-09-08 ENCOUNTER — OFFICE VISIT (OUTPATIENT)
Dept: UROLOGY | Facility: CLINIC | Age: 68
End: 2023-09-08
Payer: MEDICARE

## 2023-09-08 VITALS
DIASTOLIC BLOOD PRESSURE: 70 MMHG | WEIGHT: 156 LBS | SYSTOLIC BLOOD PRESSURE: 122 MMHG | HEIGHT: 68 IN | BODY MASS INDEX: 23.64 KG/M2 | HEART RATE: 80 BPM

## 2023-09-08 DIAGNOSIS — N13.2 HYDRONEPHROSIS CONCURRENT WITH AND DUE TO CALCULI OF KIDNEY AND URETER: ICD-10-CM

## 2023-09-08 DIAGNOSIS — N20.0 CALCULUS OF KIDNEY: Primary | ICD-10-CM

## 2023-09-08 PROCEDURE — 99214 OFFICE O/P EST MOD 30 MIN: CPT | Performed by: PHYSICIAN ASSISTANT

## 2023-09-08 NOTE — PROGRESS NOTES
9/8/2023      Chief Complaint   Patient presents with   • Follow-up         Assessment and Plan    77 y.o. male managed by Dr Gloria Paz    1. Left ureteral calculus    Left nephrostomy tube in position. Discussed risks benefits technique cystoscopy, left ureteroscopy laser lithotripsy retrograde pyelogram ureteral stent insertion and nephrostomy tube capping and/or removal in the operating in the next few weeks. He is ready to proceed. He has made full medical recovery from recent hospitalization. History of Present Illness  Sheri Lyons is a 77 y.o. male here for evaluation of hospital follow-up large left obstructing proximal ureteral stone and sepsis had emergent left percutaneous nephrostomy tube inserted 8/27/2023. Needs follow-up surgery. Baseline neurogenic bladder from BPH refractory retention and hydro despite UroLift, managed with [facility] for few years with improvements. There are some bladder calculi seen recently as well. Review of Systems   Constitutional: Negative for activity change, appetite change, chills, fever and unexpected weight change. HENT: Negative. Respiratory: Negative. Negative for shortness of breath. Cardiovascular: Negative. Negative for chest pain. Gastrointestinal: Negative for abdominal pain, diarrhea, nausea and vomiting. Endocrine: Negative. Genitourinary: Positive for difficulty urinating (baseline). Negative for decreased urine volume, dysuria, flank pain, frequency, hematuria, scrotal swelling, testicular pain and urgency. Musculoskeletal: Negative for back pain and gait problem. Skin: Negative. Allergic/Immunologic: Negative. Neurological: Negative. Hematological: Negative for adenopathy. Does not bruise/bleed easily.                 Vitals  Vitals:    09/08/23 1546   BP: 122/70   BP Location: Left arm   Patient Position: Sitting   Cuff Size: Adult   Pulse: 80   Weight: 70.8 kg (156 lb)   Height: 5' 8" (1.727 m)       Physical Exam  Vitals and nursing note reviewed. Constitutional:       General: He is not in acute distress. Appearance: Normal appearance. He is well-developed. He is not ill-appearing or diaphoretic. HENT:      Head: Normocephalic and atraumatic. Pulmonary:      Effort: Pulmonary effort is normal.   Abdominal:      General: There is no distension. Tenderness: There is no abdominal tenderness. There is no right CVA tenderness or left CVA tenderness. Hernia: No hernia is present. Comments: Left nephrostomy tube with clear ailyn urine   Skin:     General: Skin is warm and dry. Neurological:      General: No focal deficit present. Mental Status: He is alert and oriented to person, place, and time.       Gait: Gait normal.   Psychiatric:         Mood and Affect: Mood normal.         Speech: Speech normal.         Behavior: Behavior normal.           Past History  Past Medical History:   Diagnosis Date   • ADD (attention deficit disorder)    • Benign prostatic hyperplasia    • Chronic kidney disease    • Dental crowns present    • Depression    • Exercises 3 to 4 times per week    • History of 2019 novel coronavirus disease (COVID-19) 01/2021    recovered at home/'pretty mild symptoms like the flu"   • Indwelling Ordonez catheter present    • Renal disorder    • Retention of urine    • Urinary retention    • Wears glasses     reading     Social History     Socioeconomic History   • Marital status: Single     Spouse name: None   • Number of children: None   • Years of education: None   • Highest education level: None   Occupational History   • None   Tobacco Use   • Smoking status: Former   • Smokeless tobacco: Never   Vaping Use   • Vaping Use: Never used   Substance and Sexual Activity   • Alcohol use: Never   • Drug use: Never   • Sexual activity: None     Comment: defer   Other Topics Concern   • None   Social History Narrative   • None     Social Determinants of Health     Financial Resource Strain: Not on file   Food Insecurity: Not on file   Transportation Needs: Not on file   Physical Activity: Not on file   Stress: Not on file   Social Connections: Not on file   Intimate Partner Violence: Not on file   Housing Stability: Not on file     Social History     Tobacco Use   Smoking Status Former   Smokeless Tobacco Never     History reviewed. No pertinent family history.     The following portions of the patient's history were reviewed and updated as appropriate: allergies, current medications, past medical history, past social history, past surgical history and problem list.    Results  No results found for this or any previous visit (from the past 1 hour(s)).]  Lab Results   Component Value Date    PSA 0.35 05/17/2023    PSA 0.2 05/10/2022    PSA 0.3 03/08/2021    PSA 0.3 03/05/2021     Lab Results   Component Value Date    CALCIUM 7.9 (L) 08/30/2023    K 3.6 08/30/2023    CO2 24 08/30/2023     08/30/2023    BUN 30 (H) 08/30/2023    CREATININE 1.33 (H) 08/30/2023     Lab Results   Component Value Date    WBC 18.16 (H) 08/30/2023    HGB 12.4 08/30/2023    HCT 37.6 08/30/2023    MCV 89 08/30/2023    PLT 67 (L) 08/30/2023

## 2023-09-14 ENCOUNTER — TELEPHONE (OUTPATIENT)
Age: 68
End: 2023-09-14

## 2023-09-15 ENCOUNTER — PREP FOR PROCEDURE (OUTPATIENT)
Dept: UROLOGY | Facility: MEDICAL CENTER | Age: 68
End: 2023-09-15

## 2023-09-15 DIAGNOSIS — R39.89 SUSPECTED UTI: ICD-10-CM

## 2023-09-15 DIAGNOSIS — Z01.812 PRE-OPERATIVE LABORATORY EXAMINATION: ICD-10-CM

## 2023-09-15 DIAGNOSIS — N20.0 CALCULUS OF KIDNEY: Primary | ICD-10-CM

## 2023-09-15 NOTE — TELEPHONE ENCOUNTER
Emailed Dr. Meghna Sethi to discuss dates - "Will need high powered laser requested given size of stone. Will need to add cystolithalopaxy of bladder stones to procedure". Patient confirmed 10/20 @ [facility] with Dr. Meghna Sethi while he is on [facility]ERRERO [facility] call. Went over all prep - will be in surgery packet as well. Patient will need urine culture 2 weeks prior. No need for EKG, had one done 8/2023. Patient is not on any major blood thinners.

## 2023-10-05 ENCOUNTER — HOSPITAL ENCOUNTER (EMERGENCY)
Facility: HOSPITAL | Age: 68
Discharge: HOME | End: 2023-10-05
Attending: EMERGENCY MEDICINE | Admitting: EMERGENCY MEDICINE
Payer: MEDICARE

## 2023-10-05 VITALS
OXYGEN SATURATION: 94 % | SYSTOLIC BLOOD PRESSURE: 137 MMHG | TEMPERATURE: 97.8 F | HEART RATE: 78 BPM | DIASTOLIC BLOOD PRESSURE: 93 MMHG | RESPIRATION RATE: 18 BRPM

## 2023-10-05 DIAGNOSIS — T83.9XXA PROBLEM WITH URINARY CATHETER (HCC): ICD-10-CM

## 2023-10-05 DIAGNOSIS — Z93.6 NEPHROSTOMY STATUS (HCC): Primary | ICD-10-CM

## 2023-10-05 PROCEDURE — 99283 EMERGENCY DEPT VISIT LOW MDM: CPT

## 2023-10-05 PROCEDURE — 99283 EMERGENCY DEPT VISIT LOW MDM: CPT | Performed by: EMERGENCY MEDICINE

## 2023-10-05 NOTE — ED PROVIDER NOTES
History  Chief Complaint   Patient presents with   • Urinary Catheter Problem     Pt reports he just needs his urinary bag changed. Pt voiced no other complaints     Patient is a 75-year-old male with history of left-sided nephrostomy tube in place that presents for evaluation of leaking catheter bag. Patient says that his nephrostomy tube is acting normally and he is flushing it. No flank pain or abdominal pain is noted. He denies fevers chills rigors nausea vomiting. He is only here because his urinary catheter bag itself is leaking. Prior to Admission Medications   Prescriptions Last Dose Informant Patient Reported? Taking? BD PosiFlush 0.9 % SOLN  Self Yes No   Sig: USE 10 MLS BY INTRACATHETER ROUTE DAILY FOR FLUSHING   Cholecalciferol (VITAMIN D3 PO)  Self Yes No   Sig: Take by mouth   Patient not taking: Reported on 9/8/2023   GLUTATHIONE PO  Self Yes No   Sig: Take by mouth daily   HYDROcodone-acetaminophen (NORCO) 5-325 mg per tablet  Self No No   Sig: Take 1 tablet by mouth every 6 (six) hours as needed for pain for up to 5 dosesMax Daily Amount: 4 tablets   Multiple Vitamin (multivitamin) tablet  Self Yes No   Sig: Take 1 tablet by mouth daily   Patient not taking: Reported on 9/8/2023   Multiple Vitamins-Minerals (ZINC PO)  Self Yes No   Sig: Take by mouth   Patient not taking: Reported on 9/8/2023   QUERCETIN PO  Self Yes No   Sig: Take by mouth daily   Patient not taking: Reported on 9/8/2023   amphetamine-dextroamphetamine (ADDERALL) 20 mg tablet  Self Yes No   Sig: Take 20 mg by mouth in the morning and 20 mg at noon.  2 tabs Am and 1 tab PM .   finasteride (PROSCAR) 5 mg tablet  Self No No   Sig: Take 1 tablet (5 mg total) by mouth daily for 90 doses   Patient taking differently: Take 5 mg by mouth every evening    phenazopyridine (PYRIDIUM) 100 mg tablet  Self No No   Sig: Take 1 tablet (100 mg total) by mouth 3 (three) times a day as needed for bladder spasms   sodium chloride, PF, 0.9 %  Self No No   Sig: 10 mL by Intracatheter route daily for 90 doses Intracatheter flushing daily. May substitute prefilled syringe with normal saline 10 mL vials, 10 mL syringes, and 18 g blunt needles   Patient not taking: Reported on 9/8/2023   tamsulosin (FLOMAX) 0.4 mg  Self Yes No   Sig: Take 0.4 mg by mouth daily with dinner 2 tabs   Patient not taking: Reported on 9/8/2023      Facility-Administered Medications: None       Past Medical History:   Diagnosis Date   • ADD (attention deficit disorder)    • Benign prostatic hyperplasia    • Chronic kidney disease    • Dental crowns present    • Depression    • Exercises 3 to 4 times per week    • History of 2019 novel coronavirus disease (COVID-19) 01/2021    recovered at home/'pretty mild symptoms like the flu"   • Indwelling Ordonez catheter present    • Renal disorder    • Retention of urine    • Urinary retention    • Wears glasses     reading       Past Surgical History:   Procedure Laterality Date   • FRACTURE SURGERY Right     R. Hip Sx; screw implanted   • IR NEPHROSTOMY TUBE PLACEMENT  8/27/2023   • [place] CYSTO INSERTION TRANSPROSTATIC IMPLANT SINGLE N/A 10/20/2021    Procedure: CYSTOSCOPY WITH INSERTION UROLIFT;  Surgeon: Isabel Padilla MD;  Location: [facility] OR;  Service: Urology   • NJ RPR 1ST INGUN HRNA AGE 5 YRS/> REDUCIBLE Right 4/27/2021    Procedure: OPEN INGUINAL HERNIA REPAIR WITH MESH;  Surgeon: Silviano Everett MD;  Location: [address] [place] [address] OR;  Service: General   • NJ RPR UMBILICAL HRNA 5 YRS/> REDUCIBLE N/A 4/27/2021    Procedure: OPEN UMBILICAL HERNIA REPAIR WITH MESH;  Surgeon: Silviano Everett MD;  Location: [address] [place] [address] OR;  Service: General   • TONSILLECTOMY     • WISDOM TOOTH EXTRACTION         History reviewed. No pertinent family history. I have reviewed and agree with the history as documented.     E-Cigarette/Vaping   • E-Cigarette Use Never User      E-Cigarette/Vaping Substances   • Nicotine No    • THC No    • CBD No    • Flavoring No    • Other No    • Unknown No      Social History     Tobacco Use   • Smoking status: Former   • Smokeless tobacco: Never   Vaping Use   • Vaping Use: Never used   Substance Use Topics   • Alcohol use: Never   • Drug use: Never       Review of Systems   Constitutional: Negative for fever. HENT: Negative for sore throat. Eyes: Negative for photophobia. Respiratory: Negative for shortness of breath. Cardiovascular: Negative for chest pain. Gastrointestinal: Negative for abdominal pain. Genitourinary: Negative for dysuria. Musculoskeletal: Negative for back pain. Skin: Negative for rash. Neurological: Negative for light-headedness. Hematological: Negative for adenopathy. Psychiatric/Behavioral: Negative for agitation. All other systems reviewed and are negative. Physical Exam  Physical Exam  Vitals reviewed. Constitutional:       General: He is not in acute distress. Appearance: He is well-developed. HENT:      Head: Normocephalic. Eyes:      Pupils: Pupils are equal, round, and reactive to light. Cardiovascular:      Rate and Rhythm: Normal rate and regular rhythm. Heart sounds: Normal heart sounds. No murmur heard. No friction rub. No gallop. Pulmonary:      Effort: Pulmonary effort is normal.      Breath sounds: Normal breath sounds. Abdominal:      General: Bowel sounds are normal. There is no distension. Palpations: Abdomen is soft. Tenderness: There is no abdominal tenderness. There is no guarding. Musculoskeletal:         General: Normal range of motion. Cervical back: Normal range of motion and neck supple. Comments: Nephrostomy tube in place   Skin:     Capillary Refill: Capillary refill takes less than 2 seconds. Neurological:      Mental Status: He is alert and oriented to person, place, and time. Cranial Nerves: No cranial nerve deficit. Sensory: No sensory deficit. Motor: No abnormal muscle tone. Psychiatric:         Behavior: Behavior normal.         Thought Content: Thought content normal.         Judgment: Judgment normal.         Vital Signs  ED Triage Vitals [10/05/23 0508]   Temperature Pulse Respirations Blood Pressure SpO2   97.8 °F (36.6 °C) 78 18 137/93 94 %      Temp Source Heart Rate Source Patient Position - Orthostatic VS BP Location FiO2 (%)   Tympanic -- -- Right arm --      Pain Score       No Pain           Vitals:    10/05/23 0508   BP: 137/93   Pulse: 78         Visual Acuity      ED Medications  Medications - No data to display    Diagnostic Studies  Results Reviewed     None                 No orders to display              Procedures  Procedures         ED Course                               SBIRT 20yo+    Flowsheet Row Most Recent Value   Initial Alcohol Screen: US AUDIT-C     1. How often do you have a drink containing alcohol? 0 Filed at: 10/05/2023 0524   2. How many drinks containing alcohol do you have on a typical day you are drinking? 0 Filed at: 10/05/2023 0524   3a. Male UNDER 65: How often do you have five or more drinks on one occasion? 0 Filed at: 10/05/2023 0524   3b. FEMALE Any Age, or MALE 65+: How often do you have 4 or more drinks on one occassion? 0 Filed at: 10/05/2023 0524   Audit-C Score 0 Filed at: 10/05/2023 7345   IVANA: How many times in the past year have you. .. Used an illegal drug or used a prescription medication for non-medical reasons? Never Filed at: 10/05/2023 0186                    Medical Decision Making  Patient is a 64-year-old male presents for evaluation of leaking catheter bag. Nephrostomy tube is draining appropriately and the site itself looks good. The catheter bag was changed by nursing and patient was discharged with follow-up instructions.         Disposition  Final diagnoses:   Nephrostomy status ([address])   Problem with urinary catheter ([address])     Time reflects when diagnosis was documented in both MDM as applicable and the Disposition within this note     Time User Action Codes Description Comment    10/5/2023  6:28 AM Sania Comment Add [Z93.6] Nephrostomy status ([address])     10/5/2023  6:28 AM Charlie Alfaro Add [T83. 9XXA] Problem with urinary catheter [facility])       ED Disposition     ED Disposition   Discharge    Condition   Stable    Date/Time   Thu Oct 5, 2023 [address] discharge to home/self care. Follow-up Information     Follow up With Specialties Details Why Contact Info Additional [address] [facility] Emergency Department Emergency Medicine  If symptoms worsen [address] [facility] [address] [place] [address]  [phone] 2500 [facility] Emergency Department, [address] \[facility] [place] [facility]\"" [facility], [address] [place] [address]          Patient's Medications   Discharge Prescriptions    No medications on file       No discharge procedures on file.     PDMP Review       Value Time User    PDMP Reviewed  Yes 8/29/2023  3:45 PM Lelo Suggs PA-C          ED Provider  Electronically Signed by           Mago Shah MD  10/05/23 0630

## 2023-10-05 NOTE — H&P (VIEW-ONLY)
History  Chief Complaint   Patient presents with   • Urinary Catheter Problem     Pt reports he just needs his urinary bag changed. Pt voiced no other complaints     Patient is a 64-year-old male with history of left-sided nephrostomy tube in place that presents for evaluation of leaking catheter bag. Patient says that his nephrostomy tube is acting normally and he is flushing it. No flank pain or abdominal pain is noted. He denies fevers chills rigors nausea vomiting. He is only here because his urinary catheter bag itself is leaking. Prior to Admission Medications   Prescriptions Last Dose Informant Patient Reported? Taking? BD PosiFlush 0.9 % SOLN  Self Yes No   Sig: USE 10 MLS BY INTRACATHETER ROUTE DAILY FOR FLUSHING   Cholecalciferol (VITAMIN D3 PO)  Self Yes No   Sig: Take by mouth   Patient not taking: Reported on 9/8/2023   GLUTATHIONE PO  Self Yes No   Sig: Take by mouth daily   HYDROcodone-acetaminophen (NORCO) 5-325 mg per tablet  Self No No   Sig: Take 1 tablet by mouth every 6 (six) hours as needed for pain for up to 5 dosesMax Daily Amount: 4 tablets   Multiple Vitamin (multivitamin) tablet  Self Yes No   Sig: Take 1 tablet by mouth daily   Patient not taking: Reported on 9/8/2023   Multiple Vitamins-Minerals (ZINC PO)  Self Yes No   Sig: Take by mouth   Patient not taking: Reported on 9/8/2023   QUERCETIN PO  Self Yes No   Sig: Take by mouth daily   Patient not taking: Reported on 9/8/2023   amphetamine-dextroamphetamine (ADDERALL) 20 mg tablet  Self Yes No   Sig: Take 20 mg by mouth in the morning and 20 mg at noon.  2 tabs Am and 1 tab PM .   finasteride (PROSCAR) 5 mg tablet  Self No No   Sig: Take 1 tablet (5 mg total) by mouth daily for 90 doses   Patient taking differently: Take 5 mg by mouth every evening    phenazopyridine (PYRIDIUM) 100 mg tablet  Self No No   Sig: Take 1 tablet (100 mg total) by mouth 3 (three) times a day as needed for bladder spasms   sodium chloride, PF, 0.9 %  Self No No   Sig: 10 mL by Intracatheter route daily for 90 doses Intracatheter flushing daily. May substitute prefilled syringe with normal saline 10 mL vials, 10 mL syringes, and 18 g blunt needles   Patient not taking: Reported on 9/8/2023   tamsulosin (FLOMAX) 0.4 mg  Self Yes No   Sig: Take 0.4 mg by mouth daily with dinner 2 tabs   Patient not taking: Reported on 9/8/2023      Facility-Administered Medications: None       Past Medical History:   Diagnosis Date   • ADD (attention deficit disorder)    • Benign prostatic hyperplasia    • Chronic kidney disease    • Dental crowns present    • Depression    • Exercises 3 to 4 times per week    • History of 2019 novel coronavirus disease (COVID-19) 01/2021    recovered at home/'pretty mild symptoms like the flu"   • Indwelling Ordonez catheter present    • Renal disorder    • Retention of urine    • Urinary retention    • Wears glasses     reading       Past Surgical History:   Procedure Laterality Date   • FRACTURE SURGERY Right     R. Hip Sx; screw implanted   • IR NEPHROSTOMY TUBE PLACEMENT  8/27/2023   • [place] CYSTO INSERTION TRANSPROSTATIC IMPLANT SINGLE N/A 10/20/2021    Procedure: CYSTOSCOPY WITH INSERTION UROLIFT;  Surgeon: Rocio Maher MD;  Location: [facility] OR;  Service: Urology   • [place] RPR 1ST INGUN HRNA AGE 5 YRS/> REDUCIBLE Right 4/27/2021    Procedure: OPEN INGUINAL HERNIA REPAIR WITH MESH;  Surgeon: Cierra Coello MD;  Location: [address] [place] [address] OR;  Service: General   • OK RPR UMBILICAL HRNA 5 YRS/> REDUCIBLE N/A 4/27/2021    Procedure: OPEN UMBILICAL HERNIA REPAIR WITH MESH;  Surgeon: Cierra Coello MD;  Location: [address] [place] [address];  Service: General   • TONSILLECTOMY     • WISDOM TOOTH EXTRACTION         History reviewed. No pertinent family history. I have reviewed and agree with the history as documented.     E-Cigarette/Vaping   • E-Cigarette Use Never User      E-Cigarette/Vaping Substances   • Nicotine No    • THC No    • CBD No    • Flavoring No    • Other No    • Unknown No      Social History     Tobacco Use   • Smoking status: Former   • Smokeless tobacco: Never   Vaping Use   • Vaping Use: Never used   Substance Use Topics   • Alcohol use: Never   • Drug use: Never       Review of Systems   Constitutional: Negative for fever. HENT: Negative for sore throat. Eyes: Negative for photophobia. Respiratory: Negative for shortness of breath. Cardiovascular: Negative for chest pain. Gastrointestinal: Negative for abdominal pain. Genitourinary: Negative for dysuria. Musculoskeletal: Negative for back pain. Skin: Negative for rash. Neurological: Negative for light-headedness. Hematological: Negative for adenopathy. Psychiatric/Behavioral: Negative for agitation. All other systems reviewed and are negative. Physical Exam  Physical Exam  Vitals reviewed. Constitutional:       General: He is not in acute distress. Appearance: He is well-developed. HENT:      Head: Normocephalic. Eyes:      Pupils: Pupils are equal, round, and reactive to light. Cardiovascular:      Rate and Rhythm: Normal rate and regular rhythm. Heart sounds: Normal heart sounds. No murmur heard. No friction rub. No gallop. Pulmonary:      Effort: Pulmonary effort is normal.      Breath sounds: Normal breath sounds. Abdominal:      General: Bowel sounds are normal. There is no distension. Palpations: Abdomen is soft. Tenderness: There is no abdominal tenderness. There is no guarding. Musculoskeletal:         General: Normal range of motion. Cervical back: Normal range of motion and neck supple. Comments: Nephrostomy tube in place   Skin:     Capillary Refill: Capillary refill takes less than 2 seconds. Neurological:      Mental Status: He is alert and oriented to person, place, and time. Cranial Nerves: No cranial nerve deficit. Sensory: No sensory deficit. Motor: No abnormal muscle tone. Psychiatric:         Behavior: Behavior normal.         Thought Content: Thought content normal.         Judgment: Judgment normal.         Vital Signs  ED Triage Vitals [10/05/23 0508]   Temperature Pulse Respirations Blood Pressure SpO2   97.8 °F (36.6 °C) 78 18 137/93 94 %      Temp Source Heart Rate Source Patient Position - Orthostatic VS BP Location FiO2 (%)   Tympanic -- -- Right arm --      Pain Score       No Pain           Vitals:    10/05/23 0508   BP: 137/93   Pulse: 78         Visual Acuity      ED Medications  Medications - No data to display    Diagnostic Studies  Results Reviewed     None                 No orders to display              Procedures  Procedures         ED Course                               SBIRT 20yo+    Flowsheet Row Most Recent Value   Initial Alcohol Screen: US AUDIT-C     1. How often do you have a drink containing alcohol? 0 Filed at: 10/05/2023 0524   2. How many drinks containing alcohol do you have on a typical day you are drinking? 0 Filed at: 10/05/2023 0524   3a. Male UNDER 65: How often do you have five or more drinks on one occasion? 0 Filed at: 10/05/2023 0524   3b. FEMALE Any Age, or MALE 65+: How often do you have 4 or more drinks on one occassion? 0 Filed at: 10/05/2023 0524   Audit-C Score 0 Filed at: 10/05/2023 8535   IVANA: How many times in the past year have you. .. Used an illegal drug or used a prescription medication for non-medical reasons? Never Filed at: 10/05/2023 0657                    Medical Decision Making  Patient is a 24-year-old male presents for evaluation of leaking catheter bag. Nephrostomy tube is draining appropriately and the site itself looks good. The catheter bag was changed by nursing and patient was discharged with follow-up instructions.         Disposition  Final diagnoses:   Nephrostomy status ([address])   Problem with urinary catheter ([address])     Time reflects when diagnosis was documented in both MDM as applicable and the Disposition within this note     Time User Action Codes Description Comment    10/5/2023  6:28 AM Tye Sutherland Add [Z93.6] Nephrostomy status ([address])     10/5/2023  6:28 AM Cristiana Godinez Add [T83. 9XXA] Problem with urinary catheter [facility])       ED Disposition     ED Disposition   Discharge    Condition   Stable    Date/Time   Thu Oct 5, 2023 [address] discharge to home/self care. Follow-up Information     Follow up With Specialties Details Why Contact Info Additional [address] [facility] [place] [facility] Emergency Department Emergency Medicine  If symptoms worsen [address] [facility] [address] [place] [address]  [phone] 2500 [facility] Emergency Department, [address]          Patient's Medications   Discharge Prescriptions    No medications on file       No discharge procedures on file.     PDMP Review       Value Time User    PDMP Reviewed  Yes 8/29/2023  3:45 PM Juanpablo Stiles PA-C          ED Provider  Electronically Signed by           Meseret Coleman MD  10/05/23 0630

## 2023-10-10 ENCOUNTER — APPOINTMENT (OUTPATIENT)
Dept: LAB | Facility: CLINIC | Age: 68
End: 2023-10-10
Payer: MEDICARE

## 2023-10-10 DIAGNOSIS — Z01.812 PRE-OPERATIVE LABORATORY EXAMINATION: ICD-10-CM

## 2023-10-10 DIAGNOSIS — N20.0 CALCULUS OF KIDNEY: ICD-10-CM

## 2023-10-10 DIAGNOSIS — R39.89 SUSPECTED UTI: ICD-10-CM

## 2023-10-10 PROCEDURE — 87086 URINE CULTURE/COLONY COUNT: CPT

## 2023-10-10 PROCEDURE — 87186 SC STD MICRODIL/AGAR DIL: CPT

## 2023-10-10 PROCEDURE — 87077 CULTURE AEROBIC IDENTIFY: CPT

## 2023-10-11 ENCOUNTER — TELEPHONE (OUTPATIENT)
Dept: OTHER | Facility: HOSPITAL | Age: 68
End: 2023-10-11

## 2023-10-11 DIAGNOSIS — N39.0 URINARY TRACT INFECTION WITHOUT HEMATURIA, SITE UNSPECIFIED: Primary | ICD-10-CM

## 2023-10-11 RX ORDER — LEVOFLOXACIN 750 MG/1
750 TABLET ORAL EVERY 24 HOURS
Qty: 7 TABLET | Refills: 0 | Status: SHIPPED | OUTPATIENT
Start: 2023-10-14 | End: 2023-10-21

## 2023-10-11 NOTE — TELEPHONE ENCOUNTER
Called and left detailed VM for patient per communication consent with pre-op urine culture results and advised that Levaquin was sent to [address] [place] [address] for him to start taking on 10/14, then continue x's 7 days which will take him through procedure date. Office number provided to return call with any questions.

## 2023-10-11 NOTE — TELEPHONE ENCOUNTER
preop ucx positive  start levaquin on saturday 10/14  through OR 10/20 and beyond  [facility]

## 2023-10-14 LAB
BACTERIA UR CULT: ABNORMAL
BACTERIA UR CULT: ABNORMAL

## 2023-10-17 NOTE — PRE-PROCEDURE INSTRUCTIONS
Pre-Surgery Instructions:   Medication Instructions    amphetamine-dextroamphetamine (ADDERALL) 20 mg tablet Hold day of surgery. BD PosiFlush 0.9 % SOLN Instructions provided by MD    levofloxacin (LEVAQUIN) 750 mg tablet Take day of surgery. Medication instructions for day surgery reviewed. Please use only a sip of water to take your instructed medications. Avoid all over the counter vitamins, supplements and NSAIDS for one week prior to surgery per anesthesia guidelines. Tylenol is ok to take as needed. You will receive a call one business day prior to surgery with an arrival time and hospital directions. If your surgery is scheduled on a Monday, the hospital will be calling you on the Friday prior to your surgery. If you have not heard from anyone by 8pm, please call the hospital supervisor through the hospital  at [phone]. Zoie Aburto [phone]). Do not eat or drink anything after midnight the night before your surgery, including candy, mints, lifesavers, or chewing gum. Do not drink alcohol 24hrs before your surgery. Try not to smoke at least 24hrs before your surgery. Follow the pre surgery showering instructions as listed in the [facility] Surgical Experience Booklet” or otherwise provided by your surgeon's office. Do not shave the surgical area 24 hours before surgery. Do not apply any lotions, creams, including makeup, cologne, deodorant, or perfumes after showering on the day of your surgery. No contact lenses, eye make-up, or artificial eyelashes. Remove nail polish, including gel polish, and any artificial, gel, or acrylic nails if possible. Remove all jewelry including rings and body piercing jewelry. Wear causal clothing that is easy to take on and off. Consider your type of surgery. Keep any valuables, jewelry, piercings at home. Please bring any specially ordered equipment (sling, braces) if indicated.     Arrange for a responsible person to drive you to and from the hospital on the day of your surgery. Visitor Guidelines discussed. Call the surgeon's office with any new illnesses, exposures, or additional questions prior to surgery. Please reference your [facility] Surgical Experience Booklet” for additional information to prepare for your upcoming surgery.

## 2023-10-19 ENCOUNTER — ANESTHESIA EVENT (OUTPATIENT)
Dept: PERIOP | Facility: HOSPITAL | Age: 68
End: 2023-10-19
Payer: MEDICARE

## 2023-10-19 NOTE — ANESTHESIA PREPROCEDURE EVALUATION
Procedure:  CYSTOSCOPY URETEROSCOPY WITH LITHOTRIPSY HOLMIUM LASER, RETROGRADE PYELOGRAM AND INSERTION STENT URETERAL REMOVAL NEPHROSTOMY TUBE, cystolithalopaxy of bladder stones (Left: Bladder)    Relevant Problems   /RENAL   (+) Benign prostatic hyperplasia with urinary obstruction   (+) Hydronephrosis   (+) Hydronephrosis concurrent with and due to calculi of kidney and ureter   (+) Stage 2 chronic kidney disease      NEURO/PSYCH   (+) Depression      Other   (+) ADD (attention deficit disorder)   (+) Gram-negative bacteremia   (+) Persistent proteinuria      EKG Aug 2023:  Sinus tachycardia with Premature supraventricular complexes  Cannot rule out prior  Anterior infarct (cited on or before 27-APR-2021)  Abnormal ECG  When compared with ECG of 27-APR-2021 11:04,  No significant change was found  Confirmed by Deb Collado ([id number]) on 8/27/2023 10:24:55 AM    Component Ref Range & Units 8/30/23 0457 8/29/23 0443 8/28/23 0444 8/27/23 0952 8/27/23 0328 5/17/23 1419 5/10/22 1037   Sodium 135 - 147 mmol/L 138 136 136 139 141 132 Low  137   Potassium 3.5 - 5.3 mmol/L 3.6 3.5 3.1 Low  3.0 Low  3.2 Low  3.9 4.5   Chloride 96 - 108 mmol/L 107 103 99 101 96 103 99   CO2 21 - 32 mmol/L 24 25 26 26 24 27 31   ANION GAP mmol/L 7 8 11 12 21 2 Low  R 7 R   BUN 5 - 25 mg/dL 30 High  37 High  42 High  40 High  36 High  16 34 High    Creatinine 0.60 - 1.30 mg/dL 1.33 High  1.70 High  CM 2.75 High  CM 3.44 High  CM 4.35 High  CM 1.19 CM 1.99 High  CM   Comment: Standardized to IDMS reference method   Glucose 65 - 140 mg/dL 115 123    CM     Comment: If the patient is fasting, the ADA then defines impaired fasting glucose as > 100 mg/dL and diabetes as > or equal to 123 mg/dL.    Calcium 8.4 - 10.2 mg/dL 7.9 Low  7.6 Low  7.2 Low  7.2 Low  9.4 9.1 R 9.9   eGFR ml/min/1.73sq m 53 39 22 17 12 61 33                   Component Ref Range & Units 8/30/23 0457 8/29/23 0443 8/28/23 1803 8/27/23 9778 8/27/23 8479 8/27/23 0328 5/17/23 1419   WBC 4.31 - 10.16 Thousand/uL 18.16 High  26.00 High  24.75 High  26.41 High   31.47 High Panic  8.34   RBC 3.88 - 5.62 Million/uL 4.25 4.16 4.27 4.37  5.28 5.37   Hemoglobin 12.0 - 17.0 g/dL 12.4 12.4 12.6 12.7  15.8 15.4   Hematocrit 36.5 - 49.3 % 37.6 36.9 37.5 38.5  47.7 47.4   MCV 82 - 98 fL 89 89 88 88  90 88   MCH 26.8 - 34.3 pg 29.2 29.8 29.5 29.1  29.9 28.7   MCHC 31.4 - 37.4 g/dL 33.0 33.6 33.6 33.0  33.1 32.5   RDW 11.6 - 15.1 % 14.3 14.2 14.1 13.7  13.7 13.0   Platelets 005 - 062 Thousands/uL 67 Low  55 Low  76 Low  138 Low  135 Low  176 267   MPV 8.9 - 12.7 fL 12.0 12.5 10.8 10.4 10.1 10.1 11.0          Anesthesia Plan  ASA Score- 2     Anesthesia Type- general with ASA Monitors. Additional Monitors:     Airway Plan: LMA. Plan Factors-    Chart reviewed. EKG reviewed. Existing labs reviewed. Patient summary reviewed. Patient is not a current smoker. Patient did not smoke on day of surgery. Induction- intravenous. Postoperative Plan- Plan for postoperative opioid use.  Planned trial extubation    Informed Consent-

## 2023-10-20 ENCOUNTER — APPOINTMENT (OUTPATIENT)
Dept: RADIOLOGY | Facility: HOSPITAL | Age: 68
End: 2023-10-20
Payer: MEDICARE

## 2023-10-20 ENCOUNTER — ANESTHESIA (OUTPATIENT)
Dept: PERIOP | Facility: HOSPITAL | Age: 68
End: 2023-10-20
Payer: MEDICARE

## 2023-10-20 ENCOUNTER — PREP FOR PROCEDURE (OUTPATIENT)
Dept: UROLOGY | Facility: CLINIC | Age: 68
End: 2023-10-20

## 2023-10-20 ENCOUNTER — HOSPITAL ENCOUNTER (OUTPATIENT)
Facility: HOSPITAL | Age: 68
Setting detail: OUTPATIENT SURGERY
Discharge: HOME | End: 2023-10-20
Attending: UROLOGY | Admitting: UROLOGY
Payer: MEDICARE

## 2023-10-20 VITALS
RESPIRATION RATE: 18 BRPM | SYSTOLIC BLOOD PRESSURE: 159 MMHG | BODY MASS INDEX: 24.25 KG/M2 | HEIGHT: 68 IN | DIASTOLIC BLOOD PRESSURE: 96 MMHG | TEMPERATURE: 97.6 F | HEART RATE: 61 BPM | OXYGEN SATURATION: 98 % | WEIGHT: 160 LBS

## 2023-10-20 DIAGNOSIS — N20.1 LEFT URETERAL STONE: ICD-10-CM

## 2023-10-20 DIAGNOSIS — N21.0 BLADDER STONE: Primary | ICD-10-CM

## 2023-10-20 DIAGNOSIS — N13.2 HYDRONEPHROSIS CONCURRENT WITH AND DUE TO CALCULI OF KIDNEY AND URETER: ICD-10-CM

## 2023-10-20 DIAGNOSIS — N20.1 LEFT URETERAL STONE: Primary | ICD-10-CM

## 2023-10-20 PROCEDURE — C1894 INTRO/SHEATH, NON-LASER: HCPCS | Performed by: UROLOGY

## 2023-10-20 PROCEDURE — 50389 REMOVE RENAL TUBE W/FLUORO: CPT | Performed by: UROLOGY

## 2023-10-20 PROCEDURE — C1769 GUIDE WIRE: HCPCS | Performed by: UROLOGY

## 2023-10-20 PROCEDURE — C1758 CATHETER, URETERAL: HCPCS | Performed by: UROLOGY

## 2023-10-20 PROCEDURE — 82360 CALCULUS ASSAY QUANT: CPT | Performed by: UROLOGY

## 2023-10-20 PROCEDURE — 74420 UROGRAPHY RTRGR +-KUB: CPT

## 2023-10-20 PROCEDURE — C1747 URETEROSCOPE DIGITAL FLEX SNGL USE RVS DEFLECTION APTRA: HCPCS | Performed by: UROLOGY

## 2023-10-20 PROCEDURE — C2617 STENT, NON-COR, TEM W/O DEL: HCPCS | Performed by: UROLOGY

## 2023-10-20 PROCEDURE — 52356 CYSTO/URETERO W/LITHOTRIPSY: CPT | Performed by: UROLOGY

## 2023-10-20 DEVICE — INLAY VERSA FIT URETERAL STENT W/O GUIDEWIRE
Type: IMPLANTABLE DEVICE | Site: URETER | Status: FUNCTIONAL
Brand: BARD® INLAY® VERSA FIT® URETERAL STENT

## 2023-10-20 RX ORDER — LABETALOL HYDROCHLORIDE 5 MG/ML
INJECTION, SOLUTION INTRAVENOUS AS NEEDED
Status: DISCONTINUED | OUTPATIENT
Start: 2023-10-20 | End: 2023-10-20

## 2023-10-20 RX ORDER — LEVOFLOXACIN 5 MG/ML
750 INJECTION, SOLUTION INTRAVENOUS ONCE
Status: COMPLETED | OUTPATIENT
Start: 2023-10-20 | End: 2023-10-20

## 2023-10-20 RX ORDER — FENTANYL CITRATE 50 UG/ML
INJECTION, SOLUTION INTRAMUSCULAR; INTRAVENOUS AS NEEDED
Status: DISCONTINUED | OUTPATIENT
Start: 2023-10-20 | End: 2023-10-20

## 2023-10-20 RX ORDER — OXYBUTYNIN CHLORIDE 5 MG/1
5 TABLET, EXTENDED RELEASE ORAL ONCE
Status: DISCONTINUED | OUTPATIENT
Start: 2023-10-20 | End: 2023-10-20 | Stop reason: HOSPADM

## 2023-10-20 RX ORDER — PHENAZOPYRIDINE HYDROCHLORIDE 100 MG/1
100 TABLET, FILM COATED ORAL ONCE
Status: DISCONTINUED | OUTPATIENT
Start: 2023-10-20 | End: 2023-10-20 | Stop reason: HOSPADM

## 2023-10-20 RX ORDER — LIDOCAINE HYDROCHLORIDE 20 MG/ML
JELLY TOPICAL AS NEEDED
Status: DISCONTINUED | OUTPATIENT
Start: 2023-10-20 | End: 2023-10-20 | Stop reason: HOSPADM

## 2023-10-20 RX ORDER — TAMSULOSIN HYDROCHLORIDE 0.4 MG/1
0.4 CAPSULE ORAL
Qty: 30 CAPSULE | Refills: 0 | Status: SHIPPED | OUTPATIENT
Start: 2023-10-20 | End: 2023-11-19

## 2023-10-20 RX ORDER — PROPOFOL 10 MG/ML
INJECTION, EMULSION INTRAVENOUS AS NEEDED
Status: DISCONTINUED | OUTPATIENT
Start: 2023-10-20 | End: 2023-10-20

## 2023-10-20 RX ORDER — ACETAMINOPHEN 325 MG/1
650 TABLET ORAL EVERY 6 HOURS PRN
Status: DISCONTINUED | OUTPATIENT
Start: 2023-10-20 | End: 2023-10-20 | Stop reason: HOSPADM

## 2023-10-20 RX ORDER — LIDOCAINE HYDROCHLORIDE 20 MG/ML
INJECTION, SOLUTION EPIDURAL; INFILTRATION; INTRACAUDAL; PERINEURAL AS NEEDED
Status: DISCONTINUED | OUTPATIENT
Start: 2023-10-20 | End: 2023-10-20

## 2023-10-20 RX ORDER — FENTANYL CITRATE/PF 50 MCG/ML
25 SYRINGE (ML) INJECTION
Status: DISCONTINUED | OUTPATIENT
Start: 2023-10-20 | End: 2023-10-20 | Stop reason: HOSPADM

## 2023-10-20 RX ORDER — DEXAMETHASONE SODIUM PHOSPHATE 10 MG/ML
INJECTION, SOLUTION INTRAMUSCULAR; INTRAVENOUS AS NEEDED
Status: DISCONTINUED | OUTPATIENT
Start: 2023-10-20 | End: 2023-10-20

## 2023-10-20 RX ORDER — ONDANSETRON 2 MG/ML
4 INJECTION INTRAMUSCULAR; INTRAVENOUS ONCE AS NEEDED
Status: DISCONTINUED | OUTPATIENT
Start: 2023-10-20 | End: 2023-10-20 | Stop reason: HOSPADM

## 2023-10-20 RX ORDER — CEFAZOLIN SODIUM 2 G/50ML
2000 SOLUTION INTRAVENOUS ONCE
Status: DISCONTINUED | OUTPATIENT
Start: 2023-10-20 | End: 2023-10-20

## 2023-10-20 RX ORDER — ONDANSETRON 2 MG/ML
INJECTION INTRAMUSCULAR; INTRAVENOUS AS NEEDED
Status: DISCONTINUED | OUTPATIENT
Start: 2023-10-20 | End: 2023-10-20

## 2023-10-20 RX ORDER — POTASSIUM CITRATE 5 MEQ/1
5 TABLET, EXTENDED RELEASE ORAL
Qty: 90 TABLET | Refills: 3 | Status: SHIPPED | OUTPATIENT
Start: 2023-10-20

## 2023-10-20 RX ORDER — LEVOFLOXACIN 5 MG/ML
500 INJECTION, SOLUTION INTRAVENOUS ONCE
OUTPATIENT
Start: 2023-10-20 | End: 2023-10-20

## 2023-10-20 RX ORDER — MAGNESIUM HYDROXIDE 1200 MG/15ML
LIQUID ORAL AS NEEDED
Status: DISCONTINUED | OUTPATIENT
Start: 2023-10-20 | End: 2023-10-20 | Stop reason: HOSPADM

## 2023-10-20 RX ORDER — SODIUM CHLORIDE, SODIUM LACTATE, POTASSIUM CHLORIDE, CALCIUM CHLORIDE 600; 310; 30; 20 MG/100ML; MG/100ML; MG/100ML; MG/100ML
INJECTION, SOLUTION INTRAVENOUS CONTINUOUS PRN
Status: DISCONTINUED | OUTPATIENT
Start: 2023-10-20 | End: 2023-10-20

## 2023-10-20 RX ORDER — OXYBUTYNIN CHLORIDE 5 MG/1
5 TABLET, EXTENDED RELEASE ORAL DAILY
Qty: 30 TABLET | Refills: 0 | Status: SHIPPED | OUTPATIENT
Start: 2023-10-20 | End: 2023-11-19

## 2023-10-20 RX ORDER — PHENAZOPYRIDINE HYDROCHLORIDE 100 MG/1
100 TABLET, FILM COATED ORAL 3 TIMES DAILY PRN
Qty: 20 TABLET | Refills: 0 | Status: SHIPPED | OUTPATIENT
Start: 2023-10-20

## 2023-10-20 RX ORDER — TAMSULOSIN HYDROCHLORIDE 0.4 MG/1
0.4 CAPSULE ORAL ONCE
Status: DISCONTINUED | OUTPATIENT
Start: 2023-10-20 | End: 2023-10-20 | Stop reason: HOSPADM

## 2023-10-20 RX ADMIN — FENTANYL CITRATE 50 MCG: 50 INJECTION INTRAMUSCULAR; INTRAVENOUS at 08:16

## 2023-10-20 RX ADMIN — ACETAMINOPHEN 650 MG: 325 TABLET, FILM COATED ORAL at 11:26

## 2023-10-20 RX ADMIN — ONDANSETRON 4 MG: 2 INJECTION INTRAMUSCULAR; INTRAVENOUS at 07:54

## 2023-10-20 RX ADMIN — LIDOCAINE HYDROCHLORIDE 100 MG: 20 INJECTION, SOLUTION EPIDURAL; INFILTRATION; INTRACAUDAL; PERINEURAL at 07:50

## 2023-10-20 RX ADMIN — DEXAMETHASONE SODIUM PHOSPHATE 10 MG: 10 INJECTION, SOLUTION INTRAMUSCULAR; INTRAVENOUS at 07:53

## 2023-10-20 RX ADMIN — PROPOFOL 200 MG: 10 INJECTION, EMULSION INTRAVENOUS at 07:50

## 2023-10-20 RX ADMIN — LABETALOL HYDROCHLORIDE 5 MG: 5 INJECTION, SOLUTION INTRAVENOUS at 09:17

## 2023-10-20 RX ADMIN — LEVOFLOXACIN: 750 INJECTION, SOLUTION INTRAVENOUS at 07:53

## 2023-10-20 RX ADMIN — LABETALOL HYDROCHLORIDE 5 MG: 5 INJECTION, SOLUTION INTRAVENOUS at 09:40

## 2023-10-20 RX ADMIN — FENTANYL CITRATE 50 MCG: 50 INJECTION INTRAMUSCULAR; INTRAVENOUS at 07:59

## 2023-10-20 RX ADMIN — SODIUM CHLORIDE, SODIUM LACTATE, POTASSIUM CHLORIDE, AND CALCIUM CHLORIDE: .6; .31; .03; .02 INJECTION, SOLUTION INTRAVENOUS at 07:48

## 2023-10-20 NOTE — PROGRESS NOTES
Post Op Note    Cornell Hurt is a 77 y.o. male s/p Left URS, Left stent insertion, removal of nephrostomy tube, cystolithalopaxy and UD performed 10/20/2023. Cornell Hurt is a patient of Dr. Dr. Miguel Ángel Cooper and is seen at the [facility] office. How would you rate your pain on a scale from 1 to 10, 10 being the worst pain ever? 0  Have you had a fever? No    Have your bowel movements been regular? Yes      If the patient has a ordonez- are you comfortable caring for your ordonez? Yes Is it draining urine? Yes  Do you have any other questions or concerns that I can address at this time? Contacted and spoke with the patient who just got home. Patient states he is fine. Ordonez patent, draining yellow urine. Scheduled 10/20/2023 at 0930 for ordonez removal at the [facility].

## 2023-10-20 NOTE — INTERVAL H&P NOTE
H&P reviewed. After examining the patient I find no changes in the patients condition since the H&P had been written. Met with patient in the prep and hold area. Hospitalization from August reviewed. Discussed plan for surgery today which includes discussed plan for surgery today which includes cystoscopy, Cystolitholapaxy of bladder stone, [LEFT] retrograde pyelogram, ureteroscopy with possible laser lithotripsy and basket extraction of stone, stent placement. I am hopeful to be able to remove the nephrostomy tube at the end of today's procedure but this will depend on the operative findings. Risks and benefits of bleeding, infection, urosepsis, trauma or damage to surrounding structures, need for multiple or staged procedures was discussed. Given that the patient perform CIC, it may be prudent to discharge the patient with urethral catheter in place at the end of today's procedure. He understands he would have a stent in place which will not be permanent and will need to be removed or exchanged depending on the outcomes of surgery. Informed consent signed, left side marked.       Vitals:    10/20/23 0618   BP: 162/83   Pulse: 63   Resp: 18   Temp: 97.5 °F (36.4 °C)   SpO2: 98%

## 2023-10-20 NOTE — DISCHARGE INSTR - AVS FIRST PAGE
Zoey Bui -the degree of bladder stones was significant however these were completely treated and removed. I was unable to effectively evaluate the left upper kidney and urinary tract. Your nephrostomy tube was removed and exchanged for an indwelling stent. You will require a secondary surgery to reinspect the upper tracts which will be arranged in the outpatient setting. Because of your neurogenic bladder and the degree of bladder stone surgery, a urethral catheter has been left in place. This will be removed on Monday. I have recommended starting a new medication, potassium citrate. This will help to reduce additional stone propagation or formation. This should be taken 3 times a day with meals. .    Your stone was not addressed during today's surgery and you will absolutely need to return for interval stone surgery. This next procedure will be coordinated by our office. Please be aware that failure to followup can result in dangerous complication as the ureteral stent cannot remain in place indefinitely. After your surgery today, a stent was left coiled in your kidney/ureter/bladder. This tube will help the area to safely heal and urine to drain unobstructed. The stent placed is not permanent and cannot be left in your body indefinitely it will be exchanged at the time of your next surgery and then removed after. It is common to have some mild symptoms while the stent is in place. Pain with urination, feeling of needing to urinate frequency or urgently, flank discomfort or blood in the urine. Utilize the medications provided (flomax, ditropan, pyridium) as well as ibuprofen/motrin for pain control. Hydrate liberally with oral fluids. If there are issues at home, our office should be notified at [phone].

## 2023-10-20 NOTE — OP NOTE
OPERATIVE REPORT  PATIENT NAME: Mejia Norman    :  1955  MRN: [id number]  Pt Location: [facility] CYSTO ROOM 01    SURGERY DATE: 10/20/2023    Surgeon(s) and Role:     * Juanita Rivero MD - Primary    Preop Diagnosis:  Hydronephrosis concurrent with and due to calculi of kidney and ureter [N13.2]    Post-Op Diagnosis Codes: * Hydronephrosis concurrent with and due to calculi of kidney and ureter [N13.2]    Procedure(s):  Left - CYSTOSCOPY URETEROSCOPY WITH LITHOTRIPSY HOLMIUM LASER. RETROGRADE PYELOGRAM AND INSERTION STENT URETERAL REMOVAL NEPHROSTOMY TUBE. cystolithalopaxy of bladder stones. URETHERAL DILATION    Specimen(s):  ID Type Source Tests Collected by Time Destination   A : BLADDER STONE Calculus Urinary Bladder STONE ANALYSIS Juanita Rivero MD 10/20/2023 [id number]        Estimated Blood Loss:   Minimal    Drains:  Urethral Catheter Latex 18 Fr. (Active)   Number of days: 0       Anesthesia Type:   General    Operative Indications:  Hydronephrosis concurrent with and due to calculi of kidney and ureter [N13.2]      Operative Findings:  Large bladder stones greater than 5 cm in total size  High-powered laser lithotripsy of the stones with a 1000 nm laser fiber and Ellik evacuation  Complete clearance of bladder stone  Semirigid ureteroscopy without ureteral stones noted on the left side  Bifid renal pelvis with difficulty accessing the upper calyces   Nephrostomy removed under continuous fluoroscopy  7 [place] multilength stent placed after multiple manipulations to achieve appropriate decompression    Complications:   None    Procedure and Technique:  Mejia Norman is a 77 y.o. male with history of poor bladder emptying performing clean intermittent catheterization. Patient presented in August with a septic obstructing left-sided stone and is now status post nephrostomy tube placement. Admission CT demonstrated a significant burden of bladder stones.     The patient was counseled regarding their options and ultimately opted to proceed with cystoscopy with laser cystolitholapaxy of bladder stones, left retrograde pyelogram with ureteroscopy and treatment of ureteral stones and hopeful removal of the left nephrostomy tube. Risks and benefits of the procedure were described and the patient signed an informed consent. On 10/20/2023, the patient proceeded to the operating room. They were laid supine on the operating room table and a pre-procedure time out was performed with all parties present and in agreement of the procedure planned and laterality. Patient received intravenous antibiotics in the form of Levaquin and sequential compression devices were placed on bilateral lower extremities. They were then induced with a LMA anesthetic. Patient was placed in dorsolithotomy with care to pad all pressure points. Perineum and genitalia prepped and draped. After appropriate timeout was performed, 2% viscous lidocaine placed per urethra. Urethral sounds were used to dilate the urethra to allow access of the continuous-flow resectoscope sheath. Once inside the bladder, we immediately encountered a high volume of mobile stones. These occupied most of the patient's bladder lumen and total volume greater than 5 cm. Using a 1000 nm laser fiber on high-powered laser generator settings, the stones were ablated with both fragmentation and popcorn settings. Ellik evacuation was performed to evacuate all of the stone fragments. Lithotripsy of the bladder stones took greater than 1 hour time. Was significantly complex lithotripsy. After completion of the bladder stone evacuation, left ureteral orifice was identified. Patient's nephrostomy tube had been open to gravity drainage to allow decompression of the upper tracts. Bard Solo plus wire introduced proximally. We were able to visualize the nephrostomy tube previously placed on fluoroscopy.   Long semirigid ureteroscope passed alongside the wire into the distal mid and proximal ureter. No ureteral stones were noted. Can wire was placed through the scope and the scope was removed and a push pull fashion. 12/14 space 35 cm ureteral access sheath placed under continuous fluoroscopy. Single use disposable flexible ureteroscope was paced through the sheath and into the upper ureter. Upper ureter was noted to be tortuous and nondilated. We then clamped the nephrostomy tube to allow dilation and performed an additional pyelogram.  There appeared to be a bifid upper and lower pole calyceal system. With multiple different attempts, I was unable to effectively navigate the ureteroscope into appropriate position. Under continuous fluoroscopy, the nephrostomy tube was cut and removed intact. I was hopeful this would give more space and additional room for placement of the scope to evaluate for the upper tract stone. Unfortunately again with several different maneuvers, I was then able to appropriately navigate the scope into the different calyces. This point time, I felt it was best to place a stent into the upper calyx and return after appropriate decompression. Safety wire and working wire were still in place and the scope and access sheath were removed. Additional contrast had been placed to outline the calyceal system. Initial attempt to place a 7 [place] 26 stent was unsuccessful as there was a significant 180 degree turn between the ureteropelvic junction and upper pole calyx. Multiple manipulations had to be performed to advance a 7 [place] multilength stent with good coiling in the upper pole infundibulum. The stent was appropriately positioned, the patient's bladder was left full and the cystoscope removed. A coudé catheter was placed into his bladder to allow for decompression given the significant bladder stone treatment and the patient's known history of neurogenic bladder requiring CIC.     At the completion of the procedure, the patient was extubated and transferred to PACU in good condition. Plan-patient will have his catheter removed on Monday and return to his CIC regimen. Secondary surgery will be arranged for repeat staged reinspection of the left upper tract.     Patient Disposition:  PACU         SIGNATURE: Adiel Armenta MD  DATE: October 20, 2023  TIME: 9:35 AM

## 2023-10-20 NOTE — ANESTHESIA POSTPROCEDURE EVALUATION
Post-Op Assessment Note    CV Status:  Stable  Pain Score: 0    Pain management: adequate     Mental Status:  Sleepy   Hydration Status:  Stable   PONV Controlled:  Controlled   Airway Patency:  Patent   Two or more mitigation strategies used for obstructive sleep apnea    Staff: Anesthesiologist, CRNA         No notable events documented. BP   186/107   Temp 97.1   Pulse 64   Resp 18   SpO2 100   Labetalol 5mg IVP administered in PACU for above NIBP. Please see flowsheet.   Post intervention NIBP 162/87

## 2023-10-23 ENCOUNTER — PROCEDURE VISIT (OUTPATIENT)
Dept: UROLOGY | Facility: CLINIC | Age: 68
End: 2023-10-23

## 2023-10-23 VITALS
WEIGHT: 161 LBS | OXYGEN SATURATION: 96 % | HEIGHT: 68 IN | DIASTOLIC BLOOD PRESSURE: 90 MMHG | SYSTOLIC BLOOD PRESSURE: 150 MMHG | BODY MASS INDEX: 24.4 KG/M2 | HEART RATE: 68 BPM | RESPIRATION RATE: 20 BRPM

## 2023-10-23 DIAGNOSIS — N13.2 HYDRONEPHROSIS CONCURRENT WITH AND DUE TO CALCULI OF KIDNEY AND URETER: Primary | ICD-10-CM

## 2023-10-23 PROCEDURE — 99024 POSTOP FOLLOW-UP VISIT: CPT

## 2023-10-23 NOTE — PROGRESS NOTES
10/23/2023    Sai Miller is a 77 y.o. male  [id number]    Diagnosis:  Chief Complaint    Hydronephrosis         Patient presents for giles removal s/p cystoscopy, Left URS, Left stent insertion,removal of nephrostomy tube on 10/20/2023 with Dr. Lu Acevedo:  CT scan in one week. Call patient with results. Procedure: Giles removed after deflation of intact balloon without incident. Patient tolerated procedure well. Patient to resume CIC.     Vitals:    10/23/23 0932   BP: 150/90   Pulse: 68   Resp: 20   SpO2: 96%   Weight: 73 kg (161 lb)   Height: 5' 8" (1.727 m)         Ricardo Dooley RN

## 2023-10-25 ENCOUNTER — PREP FOR PROCEDURE (OUTPATIENT)
Dept: UROLOGY | Facility: AMBULATORY SURGERY CENTER | Age: 68
End: 2023-10-25

## 2023-10-25 DIAGNOSIS — Z01.812 PRE-OPERATIVE LABORATORY EXAMINATION: ICD-10-CM

## 2023-10-25 DIAGNOSIS — N20.0 CALCULUS OF KIDNEY: Primary | ICD-10-CM

## 2023-10-25 DIAGNOSIS — R39.89 SUSPECTED UTI: ICD-10-CM

## 2023-10-26 ENCOUNTER — HOSPITAL ENCOUNTER (OUTPATIENT)
Dept: CT IMAGING | Facility: HOSPITAL | Age: 68
Discharge: HOME | End: 2023-10-26
Attending: UROLOGY
Payer: MEDICARE

## 2023-10-26 DIAGNOSIS — N20.1 LEFT URETERAL STONE: ICD-10-CM

## 2023-10-26 PROCEDURE — G1004 CDSM NDSC: HCPCS

## 2023-10-26 PROCEDURE — 74176 CT ABD & PELVIS W/O CONTRAST: CPT

## 2023-10-27 LAB
COLOR STONE: NORMAL
COMMENT-STONE3: NORMAL
COMPOSITION: NORMAL
LABORATORY COMMENT REPORT: NORMAL
PHOTO: NORMAL
SIZE STONE: NORMAL MM
SPEC SOURCE SUBJ: NORMAL
STONE ANALYSIS-IMP: NORMAL
STONE ANALYSIS-IMP: NORMAL
TRI-PHOS MFR STONE: 100 %
WT STONE: NORMAL MG

## 2023-11-17 ENCOUNTER — PROCEDURE VISIT (OUTPATIENT)
Dept: UROLOGY | Facility: MEDICAL CENTER | Age: 68
End: 2023-11-17
Payer: MEDICARE

## 2023-11-17 VITALS
DIASTOLIC BLOOD PRESSURE: 80 MMHG | HEART RATE: 68 BPM | OXYGEN SATURATION: 98 % | WEIGHT: 159 LBS | SYSTOLIC BLOOD PRESSURE: 120 MMHG | BODY MASS INDEX: 24.1 KG/M2 | HEIGHT: 68 IN

## 2023-11-17 DIAGNOSIS — N13.2 HYDRONEPHROSIS CONCURRENT WITH AND DUE TO CALCULI OF KIDNEY AND URETER: Primary | ICD-10-CM

## 2023-11-17 PROCEDURE — 52310 CYSTOSCOPY AND TREATMENT: CPT | Performed by: PHYSICIAN ASSISTANT

## 2023-11-17 NOTE — PROGRESS NOTES
11/17/2023      Chief Complaint   Patient presents with    Follow-up     Cystoscopy -stent removal         Assessment and Plan    77 y.o. male managed by Dr Sixto Diggs    Left ureteral calculus  Urethral meatal stenosis     nephrostomy removed, ureteroscopy laser lithotripsy completed. good stone resolution on postop CT images. Here for cysto stent removal today. No voiding issues or stream issues. had urethral dilation at time of OR as well. 1.    Cystoscopy     Date/Time  11/17/2023 11:40 AM     Performed by  Fei Rehman PA-C   Authorized by  Fei Rehman PA-C     Universal Protocol:  Procedure performed by: Rebel Ribera)  Consent: Verbal consent obtained. Written consent obtained. Patient understanding: patient states understanding of the procedure being performed  Patient consent: the patient's understanding of the procedure matches consent given      Procedure Details:  Procedure type: simple removal of a foreign body, stone, or stent    Additional Procedure Details: The patient returns to the office today to undergo cystoscopy with LEFT ureteral stent removal. Risk and benefits of the procedure were discussed and informed consent was obtained. The patient was placed in the modified supine position. The genitalia were prepped and draped in a sterile fashion. Viscous lidocaine was used for local anesthesia. There was meatal stenosis. I dilated this with cone dilator. He tolerated with some difficulty. The flexible cystoscope was ultimately passed. The bladder was inspected. The stent was identified coming from the LEFT ureteral orifice. The stent was grasped with a flexible grasper and was then removed in its entirety without complications. Overall the patient tolerated the procedure. They were made aware to advise our office of any fevers greater than 101 degrees Fahrenheit, malaise, or chills.   They were advised that it is normal to have cramping pain on the ipsilateral side for a day or so after ureteral stent removal.  They are to remain well hydrated in the coming days. History of Present Illness  Manuel Ramon is a 77 y.o. male here for evaluation of cysto for stent removal    acute PCN in august for urosepsis ureteral stone  clearance URS with laser lithotripsy or ureteral and bladder calculi, urethral dilation, and removal of PCN  giles removed few days postop  CT shows good stone resolution with only few small 1-3mm fragments lower pole  the distal coil of the stent is retracted into the ureter but appears there might be something at the orifice i can try to grasp today        Review of Systems   Constitutional: Negative. Respiratory: Negative. Cardiovascular: Negative. Genitourinary:  Negative for decreased urine volume, difficulty urinating, dysuria, flank pain, frequency, hematuria and urgency. Musculoskeletal: Negative. AUA SYMPTOM SCORE      Flowsheet Row Most Recent Value   AUA SYMPTOM SCORE    How often have you had a sensation of not emptying your bladder completely after you finished urinating? 4   How often have you had to urinate again less than two hours after you finished urinating? 3   How often have you found you stopped and started again several times when you urinate? 0   How often have you found it difficult to postpone urination? 2   How often have you had a weak urinary stream? 3   How often have you had to push or strain to begin urination? 0   How many times did you most typically get up to urinate from the time you went to bed at night until the time you got up in the morning?  2   Quality of Life: If you were to spend the rest of your life with your urinary condition just the way it is now, how would you feel about that? 3   AUA SYMPTOM SCORE 14             Vitals  Vitals:    11/17/23 1134   BP: 120/80   BP Location: Left arm   Patient Position: Sitting   Cuff Size: Standard   Pulse: 68   SpO2: 98%   Weight: 72.1 kg (159 lb)   Height: 5' 8" (1.727 m)       Physical Exam  Vitals and nursing note reviewed. Constitutional:       General: He is not in acute distress. Appearance: Normal appearance. He is well-developed. He is not diaphoretic. HENT:      Head: Normocephalic and atraumatic. Pulmonary:      Effort: Pulmonary effort is normal.      Comments: No cough or audible wheeze  Abdominal:      General: There is no distension. Tenderness: There is no abdominal tenderness. There is no right CVA tenderness or left CVA tenderness. Genitourinary:     Comments: meatal stenosis otherwise normal penis and testes  Musculoskeletal:      Right lower leg: No edema. Left lower leg: No edema. Skin:     General: Skin is warm and dry. Neurological:      Mental Status: He is alert and oriented to person, place, and time.       Gait: Gait normal.   Psychiatric:         Speech: Speech normal.         Behavior: Behavior normal.           Past History  Past Medical History:   Diagnosis Date    ADD (attention deficit disorder)     Benign prostatic hyperplasia     Chronic kidney disease     Dental crowns present     Depression     Exercises 3 to 4 times per week     History of 2019 novel coronavirus disease (COVID-19) 01/2021    recovered at home/'pretty mild symptoms like the flu"    Indwelling Ordonez catheter present     Kidney stone     Renal disorder     Retention of urine     Urinary retention     Wears glasses     reading     Social History     Socioeconomic History    Marital status: Single     Spouse name: None    Number of children: None    Years of education: None    Highest education level: None   Occupational History    None   Tobacco Use    Smoking status: Former    Smokeless tobacco: Never   Vaping Use    Vaping Use: Never used   Substance and Sexual Activity    Alcohol use: Never    Drug use: Never    Sexual activity: None     Comment: defer   Other Topics Concern    None   Social History Narrative    None     Social Determinants of Health     Financial Resource Strain: Not on file   Food Insecurity: Not on file   Transportation Needs: Not on file   Physical Activity: Not on file   Stress: Not on file   Social Connections: Not on file   Intimate Partner Violence: Not on file   Housing Stability: Not on file     Social History     Tobacco Use   Smoking Status Former   Smokeless Tobacco Never     History reviewed. No pertinent family history.     The following portions of the patient's history were reviewed and updated as appropriate: allergies, current medications, past medical history, past social history, past surgical history and problem list.    Results  No results found for this or any previous visit (from the past 1 hour(s)).]  Lab Results   Component Value Date    PSA 0.35 05/17/2023    PSA 0.2 05/10/2022    PSA 0.3 03/08/2021    PSA 0.3 03/05/2021     Lab Results   Component Value Date    CALCIUM 7.9 (L) 08/30/2023    K 3.6 08/30/2023    CO2 24 08/30/2023     08/30/2023    BUN 30 (H) 08/30/2023    CREATININE 1.33 (H) 08/30/2023     Lab Results   Component Value Date    WBC 18.16 (H) 08/30/2023    HGB 12.4 08/30/2023    HCT 37.6 08/30/2023    MCV 89 08/30/2023    PLT 67 (L) 08/30/2023

## 2024-01-03 ENCOUNTER — TELEPHONE (OUTPATIENT)
Dept: UROLOGY | Facility: CLINIC | Age: 69
End: 2024-01-03

## 2024-01-03 NOTE — TELEPHONE ENCOUNTER
Spoke to patient 1/3/24 to inform him of his 1/19/24 appointment with Tyra Verde is canceled and rescheduled for 3/28/24 at 8:20 am with Tyra Verde

## 2024-02-23 DIAGNOSIS — N21.0 BLADDER STONE: ICD-10-CM

## 2024-02-23 DIAGNOSIS — N20.1 LEFT URETERAL STONE: ICD-10-CM

## 2024-02-23 RX ORDER — POTASSIUM CITRATE 5 MEQ/1
5 TABLET, EXTENDED RELEASE ORAL
Qty: 270 TABLET | Refills: 3 | Status: SHIPPED | OUTPATIENT
Start: 2024-02-23

## 2024-02-23 RX ORDER — POTASSIUM CITRATE 5 MEQ/1
5 TABLET, EXTENDED RELEASE ORAL
Qty: 90 TABLET | Refills: 0 | Status: SHIPPED | OUTPATIENT
Start: 2024-02-23 | End: 2024-02-23 | Stop reason: SDUPTHER

## 2024-03-08 ENCOUNTER — HOSPITAL ENCOUNTER (OUTPATIENT)
Dept: ULTRASOUND IMAGING | Facility: HOSPITAL | Age: 69
End: 2024-03-08
Payer: MEDICARE

## 2024-03-08 DIAGNOSIS — N13.2 HYDRONEPHROSIS CONCURRENT WITH AND DUE TO CALCULI OF KIDNEY AND URETER: ICD-10-CM

## 2024-03-08 PROCEDURE — 76775 US EXAM ABDO BACK WALL LIM: CPT

## 2024-03-14 ENCOUNTER — TELEPHONE (OUTPATIENT)
Age: 69
End: 2024-03-14

## 2024-03-14 NOTE — TELEPHONE ENCOUNTER
Called and spoke with Vasquez regarding renal US results and NICOLE Ann's note. Advised results will be reviewed in more detail at upcoming appointment.

## 2024-03-14 NOTE — TELEPHONE ENCOUNTER
Ultrasound reviewed (3/8/24) - shows no kidney swelling. Small kidney stone seen within left kidney and within the bladder. No surgery or intervention needed. Bladder wall is slightly thick this could be from chronic bladder obstruction which causes urine to remain in the bladder for longer periods of time and makes it more difficult to completely empty. According to radiology these findings are similar to past imaging. Overall, scan is not concerning.     Patient scheduled to see Tyra in a few weeks - next steps such as in office cystoscopy can be discussed at that time.

## 2024-03-14 NOTE — TELEPHONE ENCOUNTER
Radiology Test Results - Radiology Calling with report update    Pt under care of: Tyra Verde    Imaging Completed:US 3/8/2024    Significant Findings - Please review

## 2024-04-19 ENCOUNTER — OFFICE VISIT (OUTPATIENT)
Dept: UROLOGY | Facility: MEDICAL CENTER | Age: 69
End: 2024-04-19
Payer: MEDICARE

## 2024-04-19 VITALS
HEIGHT: 68 IN | OXYGEN SATURATION: 96 % | WEIGHT: 159 LBS | BODY MASS INDEX: 24.1 KG/M2 | HEART RATE: 92 BPM | DIASTOLIC BLOOD PRESSURE: 82 MMHG | SYSTOLIC BLOOD PRESSURE: 124 MMHG

## 2024-04-19 DIAGNOSIS — N13.2 HYDRONEPHROSIS CONCURRENT WITH AND DUE TO CALCULI OF KIDNEY AND URETER: Primary | ICD-10-CM

## 2024-04-19 PROCEDURE — 99213 OFFICE O/P EST LOW 20 MIN: CPT | Performed by: PHYSICIAN ASSISTANT

## 2024-04-19 NOTE — ASSESSMENT & PLAN NOTE
-US kidney and bladder on 03/08/24 did not show hydronephrosis. 5mm calculus in urinary bladder.He does have some thickening of the wall of the urinary bladder which could be from chronic bladder obstruction which causes urine to remain in the bladder for longer periods of time and makes emptying the bladder more difficulty. We discussed the overall, is not concerning.   -He is feeling well. Denies gross hematuria, dysuria, and flank pain.   -We discussed getting an US kidney and bladder in 6 months and will follow-up in the office at that time.

## 2024-04-19 NOTE — PROGRESS NOTES
4/19/2024      Chief Complaint   Patient presents with    Follow-up     Kidney stone  Retention           Assessment and Plan    68 y.o. male managed by Dr. aNjera    1. Hydronephrosis concurrent with and due to calculi of kidney and ureter  Assessment & Plan:  -US kidney and bladder on 03/08/24 did not show hydronephrosis. 5mm calculus in urinary bladder.He does have some thickening of the wall of the urinary bladder which could be from chronic bladder obstruction which causes urine to remain in the bladder for longer periods of time and makes emptying the bladder more difficulty. We discussed the overall, is not concerning.   -He is feeling well. Denies gross hematuria, dysuria, and flank pain.   -We discussed getting an US kidney and bladder in 6 months and will follow-up in the office at that time.     Orders:  -     US kidney and bladder with pvr; Future; Expected date: 10/19/2024           History of Present Illness  Vasquez Britt is a 68 y.o. male here for evaluation of a 6-month follow-up visit for left ureteral calculus. He had a ureteroscopy laser lithotripsy completed on 11/17/23. Multiple large bladder stones also treated during that procedure. He previously underwent an acute PCN in August of 2023 for urosepsis ureteral stone. He has been taking potassium citrate. He is overall feeling well. He denies gross hematuria, dysuria, and flank pain.             Review of Systems   Constitutional: Negative.    Respiratory: Negative.     Cardiovascular: Negative.    Genitourinary:  Negative for dysuria, flank pain, frequency, hematuria and urgency.           AUA SYMPTOM SCORE      Flowsheet Row Most Recent Value   AUA SYMPTOM SCORE    How often have you had a sensation of not emptying your bladder completely after you finished urinating? 5 (P)    How often have you had to urinate again less than two hours after you finished urinating? 4 (P)    How often have you found you stopped and started again several  "times when you urinate? 5 (P)    How often have you found it difficult to postpone urination? 4 (P)    How often have you had a weak urinary stream? 5 (P)    How often have you had to push or strain to begin urination? 4 (P)    How many times did you most typically get up to urinate from the time you went to bed at night until the time you got up in the morning? 5 (P)    Quality of Life: If you were to spend the rest of your life with your urinary condition just the way it is now, how would you feel about that? 6 (P)    AUA SYMPTOM SCORE 32 (P)              Vitals  Vitals:    04/19/24 1435   BP: 124/82   BP Location: Left arm   Patient Position: Sitting   Cuff Size: Adult   Pulse: 92   SpO2: 96%   Weight: 72.1 kg (159 lb)   Height: 5' 8\" (1.727 m)       Physical Exam  Constitutional:       Appearance: Normal appearance.   HENT:      Head: Normocephalic and atraumatic.   Pulmonary:      Effort: Pulmonary effort is normal. No respiratory distress.   Musculoskeletal:         General: Normal range of motion.      Cervical back: Normal range of motion and neck supple.   Neurological:      Mental Status: He is alert and oriented to person, place, and time.   Psychiatric:         Mood and Affect: Mood normal.         Behavior: Behavior normal.           Past History  Past Medical History:   Diagnosis Date    ADD (attention deficit disorder)     Benign prostatic hyperplasia     Chronic kidney disease     Dental crowns present     Depression     Exercises 3 to 4 times per week     History of 2019 novel coronavirus disease (COVID-19) 01/2021    recovered at home/'pretty mild symptoms like the flu\"    Indwelling Ordonez catheter present     Kidney stone     Renal disorder     Retention of urine     Urinary retention     Wears glasses     reading     Social History     Socioeconomic History    Marital status: Single     Spouse name: None    Number of children: None    Years of education: None    Highest education level: None "   Occupational History    None   Tobacco Use    Smoking status: Former    Smokeless tobacco: Never   Vaping Use    Vaping status: Never Used   Substance and Sexual Activity    Alcohol use: Never    Drug use: Never    Sexual activity: None     Comment: defer   Other Topics Concern    None   Social History Narrative    None     Social Determinants of Health     Financial Resource Strain: Not on file   Food Insecurity: Not on file   Transportation Needs: Not on file   Physical Activity: Not on file   Stress: Not on file   Social Connections: Not on file   Intimate Partner Violence: Not on file   Housing Stability: Not on file     Social History     Tobacco Use   Smoking Status Former   Smokeless Tobacco Never     History reviewed. No pertinent family history.    The following portions of the patient's history were reviewed and updated as appropriate: allergies, current medications, past medical history, past social history, past surgical history and problem list.    Results  No results found for this or any previous visit (from the past 1 hour(s)).]  Lab Results   Component Value Date    PSA 0.35 05/17/2023    PSA 0.2 05/10/2022    PSA 0.3 03/08/2021    PSA 0.3 03/05/2021     Lab Results   Component Value Date    CALCIUM 7.9 (L) 08/30/2023    K 3.6 08/30/2023    CO2 24 08/30/2023     08/30/2023    BUN 30 (H) 08/30/2023    CREATININE 1.33 (H) 08/30/2023     Lab Results   Component Value Date    WBC 18.16 (H) 08/30/2023    HGB 12.4 08/30/2023    HCT 37.6 08/30/2023    MCV 89 08/30/2023    PLT 67 (L) 08/30/2023       QUIANA Anderson

## 2024-05-27 NOTE — TELEPHONE ENCOUNTER
Notified patient of on call providers recommendation to send percocet to patients pharmacy  Patient verbalized understanding  Performed Resulted

## 2024-06-26 ENCOUNTER — APPOINTMENT (OUTPATIENT)
Dept: LAB | Facility: CLINIC | Age: 69
End: 2024-06-26
Payer: MEDICARE

## 2024-06-26 DIAGNOSIS — R53.81 MALAISE AND FATIGUE: ICD-10-CM

## 2024-06-26 DIAGNOSIS — R53.83 FATIGUE, UNSPECIFIED TYPE: ICD-10-CM

## 2024-06-26 DIAGNOSIS — E78.5 HYPERLIPIDEMIA, UNSPECIFIED HYPERLIPIDEMIA TYPE: ICD-10-CM

## 2024-06-26 DIAGNOSIS — R53.83 MALAISE AND FATIGUE: ICD-10-CM

## 2024-06-26 LAB
ALBUMIN SERPL BCG-MCNC: 4.2 G/DL (ref 3.5–5)
ALP SERPL-CCNC: 51 U/L (ref 34–104)
ALT SERPL W P-5'-P-CCNC: 11 U/L (ref 7–52)
ANION GAP SERPL CALCULATED.3IONS-SCNC: 11 MMOL/L (ref 4–13)
AST SERPL W P-5'-P-CCNC: 21 U/L (ref 13–39)
BILIRUB SERPL-MCNC: 0.43 MG/DL (ref 0.2–1)
BUN SERPL-MCNC: 20 MG/DL (ref 5–25)
CALCIUM SERPL-MCNC: 9.3 MG/DL (ref 8.4–10.2)
CHLORIDE SERPL-SCNC: 101 MMOL/L (ref 96–108)
CHOLEST SERPL-MCNC: 161 MG/DL
CO2 SERPL-SCNC: 30 MMOL/L (ref 21–32)
CREAT SERPL-MCNC: 1.17 MG/DL (ref 0.6–1.3)
ERYTHROCYTE [DISTWIDTH] IN BLOOD BY AUTOMATED COUNT: 13.2 % (ref 11.6–15.1)
GFR SERPL CREATININE-BSD FRML MDRD: 62 ML/MIN/1.73SQ M
GLUCOSE P FAST SERPL-MCNC: 98 MG/DL (ref 65–99)
HCT VFR BLD AUTO: 46.3 % (ref 36.5–49.3)
HDLC SERPL-MCNC: 71 MG/DL
HGB BLD-MCNC: 15 G/DL (ref 12–17)
LDLC SERPL CALC-MCNC: 68 MG/DL (ref 0–100)
MCH RBC QN AUTO: 29.5 PG (ref 26.8–34.3)
MCHC RBC AUTO-ENTMCNC: 32.4 G/DL (ref 31.4–37.4)
MCV RBC AUTO: 91 FL (ref 82–98)
NONHDLC SERPL-MCNC: 90 MG/DL
PLATELET # BLD AUTO: 237 THOUSANDS/UL (ref 149–390)
PMV BLD AUTO: 11.2 FL (ref 8.9–12.7)
POTASSIUM SERPL-SCNC: 4 MMOL/L (ref 3.5–5.3)
PROT SERPL-MCNC: 6.8 G/DL (ref 6.4–8.4)
RBC # BLD AUTO: 5.08 MILLION/UL (ref 3.88–5.62)
SODIUM SERPL-SCNC: 142 MMOL/L (ref 135–147)
TRIGL SERPL-MCNC: 109 MG/DL
WBC # BLD AUTO: 7.84 THOUSAND/UL (ref 4.31–10.16)

## 2024-06-26 PROCEDURE — 36415 COLL VENOUS BLD VENIPUNCTURE: CPT

## 2024-06-26 PROCEDURE — 85027 COMPLETE CBC AUTOMATED: CPT

## 2024-06-26 PROCEDURE — 80061 LIPID PANEL: CPT

## 2024-06-26 PROCEDURE — 80053 COMPREHEN METABOLIC PANEL: CPT

## 2024-08-01 NOTE — NURSING NOTE
Patient was discharged with all belongings, IV was removed, and AVS discussed. Instructions on nephrostomy care were given and patient voiced understanding. No questions or concerns at time of discharge. none

## 2024-10-22 ENCOUNTER — HOSPITAL ENCOUNTER (OUTPATIENT)
Dept: ULTRASOUND IMAGING | Facility: HOSPITAL | Age: 69
Discharge: HOME | End: 2024-10-22
Payer: MEDICARE

## 2024-10-22 DIAGNOSIS — N13.2 HYDRONEPHROSIS CONCURRENT WITH AND DUE TO CALCULI OF KIDNEY AND URETER: ICD-10-CM

## 2024-10-22 PROCEDURE — 76775 US EXAM ABDO BACK WALL LIM: CPT

## 2024-10-25 ENCOUNTER — OFFICE VISIT (OUTPATIENT)
Dept: UROLOGY | Facility: MEDICAL CENTER | Age: 69
End: 2024-10-25

## 2024-10-25 VITALS
SYSTOLIC BLOOD PRESSURE: 124 MMHG | OXYGEN SATURATION: 98 % | HEART RATE: 68 BPM | WEIGHT: 142 LBS | HEIGHT: 68 IN | DIASTOLIC BLOOD PRESSURE: 82 MMHG | BODY MASS INDEX: 21.52 KG/M2

## 2024-10-25 DIAGNOSIS — Z12.5 SCREENING FOR PROSTATE CANCER: Primary | ICD-10-CM

## 2024-10-25 DIAGNOSIS — N21.0 BLADDER STONE: ICD-10-CM

## 2024-10-25 NOTE — ASSESSMENT & PLAN NOTE
Status post uteroscopy with laser lithotripsy performed 11/17/2023 where multiple large bladder stones were treated during that procedure  Ultrasound of the kidney and bladder performed 3/8/2024 noted small calculi in the left kidney and urinary bladder as well as thickening of the urinary bladder wall  Patient underwent repeat ultrasound of the kidney and bladder on 10/22/2024, but unfortunately the results of this imaging study have not been finalized at this time.  We discussed today that if the patient has not visualized any passage of stones that it is likely that he continues to have a bladder stone.  We discussed that we could surgically pursue removal of the stone with litholapaxy and cystoscopy.  However, the patient defers undergoing any surgical management at this time until his recent ultrasound of the kidney and bladder is finalized.  We will await finalized ultrasound of kidney and bladder results and our office will call with results.  Additionally, we will determine follow-up based off of the results of that ultrasound and determine if the patient should proceed with bladder stone removal or continue to monitor them.

## 2024-10-25 NOTE — ASSESSMENT & PLAN NOTE
Patient's last PSA was performed 5/17/2023 and found to be 0.35.  We discussed that the patient's PSA is very low and stable and that he can repeat his PSA in 1 year from his last with follow-up in the office at that time.

## 2024-10-25 NOTE — PROGRESS NOTES
10/25/2024      Assessment and Plan    69 y.o. male managed by     Bladder stone  Status post uteroscopy with laser lithotripsy performed 11/17/2023 where multiple large bladder stones were treated during that procedure  Ultrasound of the kidney and bladder performed 3/8/2024 noted small calculi in the left kidney and urinary bladder as well as thickening of the urinary bladder wall  Patient underwent repeat ultrasound of the kidney and bladder on 10/22/2024, but unfortunately the results of this imaging study have not been finalized at this time.  We discussed today that if the patient has not visualized any passage of stones that it is likely that he continues to have a bladder stone.  We discussed that we could surgically pursue removal of the stone with litholapaxy and cystoscopy.  However, the patient defers undergoing any surgical management at this time until his recent ultrasound of the kidney and bladder is finalized.  We will await finalized ultrasound of kidney and bladder results and our office will call with results.  Additionally, we will determine follow-up based off of the results of that ultrasound and determine if the patient should proceed with bladder stone removal or continue to monitor them.    Screening for prostate cancer  Patient's last PSA was performed 5/17/2023 and found to be 0.35.  We discussed that the patient's PSA is very low and stable and that he can repeat his PSA in 1 year from his last with follow-up in the office at that time.        History of Present Illness  Vasquez Britt is a 69 y.o. male here for evaluation of history of nephrolithiasis.  Patient was last seen in the office on 4/19/2024.  Patient underwent uteroscopy laser lithotripsy on 11/17/2023.  Multiple large bladder stones were also treated during that procedure.  Patient underwent an acute PCN in August 2023 for urosepsis and ureteral stone.  Ultrasound of the kidney and bladder performed 3/8/2024 noted small  "calculi in the left kidney and urinary bladder as well as thickening of the urinary bladder wall which could be related to chronic bladder outlet obstruction.  Patient underwent ultrasound of the kidney and bladder on 10/22/2024, but results of the imaging study have not been finalized at this time.  Today, the patient is overall doing well and denies dysuria, hematuria, or flank pain.  Patient has not visualized any stones pass since his last office visit.  However, at this time the patient would prefer not to do anything about any remaining stones and just continue to monitor them.        Review of Systems   Constitutional:  Negative for chills and fever.   HENT:  Negative for ear pain and sore throat.    Eyes:  Negative for pain and visual disturbance.   Respiratory:  Negative for cough and shortness of breath.    Cardiovascular:  Negative for chest pain and palpitations.   Gastrointestinal:  Negative for abdominal pain and vomiting.   Genitourinary:  Negative for decreased urine volume, difficulty urinating, dysuria, flank pain, frequency, hematuria and urgency.   Musculoskeletal:  Negative for arthralgias and back pain.   Skin:  Negative for color change and rash.   Neurological:  Negative for seizures and syncope.   All other systems reviewed and are negative.               Vitals  Vitals:    10/25/24 0911   BP: 124/82   BP Location: Left arm   Patient Position: Sitting   Cuff Size: Adult   Pulse: 68   SpO2: 98%   Weight: 64.4 kg (142 lb)   Height: 5' 8\" (1.727 m)       Physical Exam  Vitals reviewed.   Constitutional:       General: He is not in acute distress.     Appearance: Normal appearance. He is not ill-appearing.   HENT:      Head: Normocephalic and atraumatic.      Nose: Nose normal.   Eyes:      General: No scleral icterus.  Pulmonary:      Effort: No respiratory distress.   Abdominal:      General: Abdomen is flat. There is no distension.      Palpations: Abdomen is soft.      Tenderness: There is " "no abdominal tenderness.   Musculoskeletal:         General: Normal range of motion.      Cervical back: Normal range of motion.   Skin:     General: Skin is warm.      Coloration: Skin is not jaundiced.   Neurological:      Mental Status: He is alert and oriented to person, place, and time.      Gait: Gait normal.   Psychiatric:         Mood and Affect: Mood normal.         Behavior: Behavior normal.           Past History  Past Medical History:   Diagnosis Date    ADD (attention deficit disorder)     Benign prostatic hyperplasia     Chronic kidney disease     Dental crowns present     Depression     Exercises 3 to 4 times per week     History of 2019 novel coronavirus disease (COVID-19) 01/2021    recovered at home/'pretty mild symptoms like the flu\"    Indwelling Ordonez catheter present     Kidney stone     Renal disorder     Retention of urine     Urinary retention     Wears glasses     reading     Social History     Socioeconomic History    Marital status: Single     Spouse name: None    Number of children: None    Years of education: None    Highest education level: None   Occupational History    None   Tobacco Use    Smoking status: Former    Smokeless tobacco: Never   Vaping Use    Vaping status: Never Used   Substance and Sexual Activity    Alcohol use: Never    Drug use: Yes     Types: Marijuana    Sexual activity: None     Comment: defer   Other Topics Concern    None   Social History Narrative    None     Social Determinants of Health     Financial Resource Strain: Not on file   Food Insecurity: Not on file   Transportation Needs: Not on file   Physical Activity: Not on file   Stress: Not on file   Social Connections: Not on file   Intimate Partner Violence: Not on file   Housing Stability: Not on file     Social History     Tobacco Use   Smoking Status Former   Smokeless Tobacco Never     History reviewed. No pertinent family history.    The following portions of the patient's history were reviewed and " updated as appropriate: allergies, current medications, past medical history, past social history, past surgical history and problem list.    Results  No results found for this or any previous visit (from the past 1 hour(s)).]  Lab Results   Component Value Date    PSA 0.35 05/17/2023    PSA 0.2 05/10/2022    PSA 0.3 03/08/2021    PSA 0.3 03/05/2021     Lab Results   Component Value Date    CALCIUM 9.3 06/26/2024    K 4.0 06/26/2024    CO2 30 06/26/2024     06/26/2024    BUN 20 06/26/2024    CREATININE 1.17 06/26/2024     Lab Results   Component Value Date    WBC 7.84 06/26/2024    HGB 15.0 06/26/2024    HCT 46.3 06/26/2024    MCV 91 06/26/2024     06/26/2024

## 2024-11-24 PROBLEM — Z12.5 SCREENING FOR PROSTATE CANCER: Status: RESOLVED | Noted: 2024-10-25 | Resolved: 2024-11-24

## 2025-01-28 ENCOUNTER — PATIENT MESSAGE (OUTPATIENT)
Dept: UROLOGY | Facility: MEDICAL CENTER | Age: 70
End: 2025-01-28

## 2025-01-30 ENCOUNTER — TELEPHONE (OUTPATIENT)
Dept: UROLOGY | Facility: MEDICAL CENTER | Age: 70
End: 2025-01-30

## 2025-01-30 NOTE — TELEPHONE ENCOUNTER
Order for catheter supplies with OV note was faxed to [address] [place] [address] [phone]

## 2025-02-04 ENCOUNTER — TELEPHONE (OUTPATIENT)
Dept: UROLOGY | Facility: MEDICAL CENTER | Age: 70
End: 2025-02-04

## 2025-02-06 ENCOUNTER — TELEPHONE (OUTPATIENT)
Dept: UROLOGY | Facility: MEDICAL CENTER | Age: 70
End: 2025-02-06

## 2025-02-06 PROBLEM — R33.8 BENIGN PROSTATIC HYPERPLASIA WITH URINARY RETENTION: Status: ACTIVE | Noted: 2023-08-28

## 2025-02-06 NOTE — TELEPHONE ENCOUNTER
Faxed catheter order to [address] [place] [address] with the information they are now requesting to [phone]

## 2025-07-11 ENCOUNTER — APPOINTMENT (OUTPATIENT)
Dept: LAB | Facility: CLINIC | Age: 70
End: 2025-07-11
Attending: INTERNAL MEDICINE
Payer: MEDICARE

## 2025-07-11 DIAGNOSIS — Z79.899 ENCOUNTER FOR LONG-TERM (CURRENT) USE OF MEDICATIONS: ICD-10-CM

## 2025-07-11 DIAGNOSIS — R53.83 FATIGUE, UNSPECIFIED TYPE: ICD-10-CM

## 2025-07-11 DIAGNOSIS — Z12.5 SCREENING FOR PROSTATE CANCER: ICD-10-CM

## 2025-07-11 DIAGNOSIS — N42.9 DISORDER OF PROSTATE, UNSPECIFIED: ICD-10-CM

## 2025-07-11 DIAGNOSIS — Z11.59 NEED FOR HEPATITIS C SCREENING TEST: ICD-10-CM

## 2025-07-11 LAB
ALBUMIN SERPL BCG-MCNC: 4.1 G/DL (ref 3.5–5)
ALP SERPL-CCNC: 41 U/L (ref 34–104)
ALT SERPL W P-5'-P-CCNC: 19 U/L (ref 7–52)
ANION GAP SERPL CALCULATED.3IONS-SCNC: 10 MMOL/L (ref 4–13)
AST SERPL W P-5'-P-CCNC: 31 U/L (ref 13–39)
BILIRUB SERPL-MCNC: 0.43 MG/DL (ref 0.2–1)
BUN SERPL-MCNC: 23 MG/DL (ref 5–25)
CALCIUM SERPL-MCNC: 8.9 MG/DL (ref 8.4–10.2)
CHLORIDE SERPL-SCNC: 104 MMOL/L (ref 96–108)
CHOLEST SERPL-MCNC: 175 MG/DL (ref ?–200)
CO2 SERPL-SCNC: 26 MMOL/L (ref 21–32)
CREAT SERPL-MCNC: 1.18 MG/DL (ref 0.6–1.3)
ERYTHROCYTE [DISTWIDTH] IN BLOOD BY AUTOMATED COUNT: 13.4 % (ref 11.6–15.1)
GFR SERPL CREATININE-BSD FRML MDRD: 61 ML/MIN/1.73SQ M
GLUCOSE P FAST SERPL-MCNC: 113 MG/DL (ref 65–99)
HCT VFR BLD AUTO: 47.2 % (ref 36.5–49.3)
HDLC SERPL-MCNC: 67 MG/DL
HGB BLD-MCNC: 15.6 G/DL (ref 12–17)
LDLC SERPL CALC-MCNC: 97 MG/DL (ref 0–100)
MCH RBC QN AUTO: 29.4 PG (ref 26.8–34.3)
MCHC RBC AUTO-ENTMCNC: 33.1 G/DL (ref 31.4–37.4)
MCV RBC AUTO: 89 FL (ref 82–98)
NONHDLC SERPL-MCNC: 108 MG/DL
PLATELET # BLD AUTO: 219 THOUSANDS/UL (ref 149–390)
PMV BLD AUTO: 10.8 FL (ref 8.9–12.7)
POTASSIUM SERPL-SCNC: 4.3 MMOL/L (ref 3.5–5.3)
PROT SERPL-MCNC: 6.5 G/DL (ref 6.4–8.4)
PSA SERPL-MCNC: 0.46 NG/ML (ref 0–4)
RBC # BLD AUTO: 5.3 MILLION/UL (ref 3.88–5.62)
SODIUM SERPL-SCNC: 140 MMOL/L (ref 135–147)
TRIGL SERPL-MCNC: 54 MG/DL (ref ?–150)
TSH SERPL DL<=0.05 MIU/L-ACNC: 2.79 UIU/ML (ref 0.45–4.5)
WBC # BLD AUTO: 6.78 THOUSAND/UL (ref 4.31–10.16)

## 2025-07-11 PROCEDURE — 84153 ASSAY OF PSA TOTAL: CPT

## 2025-07-11 PROCEDURE — 80053 COMPREHEN METABOLIC PANEL: CPT

## 2025-07-11 PROCEDURE — 84443 ASSAY THYROID STIM HORMONE: CPT

## 2025-07-11 PROCEDURE — 80061 LIPID PANEL: CPT

## 2025-07-11 PROCEDURE — 86803 HEPATITIS C AB TEST: CPT

## 2025-07-11 PROCEDURE — 85027 COMPLETE CBC AUTOMATED: CPT

## 2025-07-11 PROCEDURE — 36415 COLL VENOUS BLD VENIPUNCTURE: CPT

## 2025-07-12 LAB — HCV AB SER QL: NORMAL

## (undated) DEVICE — GLOVE SRG BIOGEL 7

## (undated) DEVICE — SHEATH URETERAL ACCESS 12/14FR 35CM PROXIS

## (undated) DEVICE — CATH FOLEY COUDE 18FR 5ML 2 WAY TIEMANN LUBRICATH

## (undated) DEVICE — CATH URETERAL 5FR X 70 CM FLEX TIP POLYUR BARD

## (undated) DEVICE — FIBER STD QUANTA 200 MICRON

## (undated) DEVICE — GARMENT,MEDLINE,DVT,INT,CALF,FOAM,MED: Brand: MEDLINE

## (undated) DEVICE — GLOVE SRG BIOGEL 8

## (undated) DEVICE — SUT MONOCRYL 4-0 PS-2 27 IN Y426H

## (undated) DEVICE — GLOVE INDICATOR PI UNDERGLOVE SZ 7.5 BLUE

## (undated) DEVICE — PREMIUM DRY TRAY LF: Brand: MEDLINE INDUSTRIES, INC.

## (undated) DEVICE — SINGLE-USE DIGITAL FLEXIBLE URETEROSCOPE: Brand: APTRA

## (undated) DEVICE — PACK TUR

## (undated) DEVICE — SYRINGE 30ML LL

## (undated) DEVICE — SPECIMEN CONTAINER STERILE PEEL PACK

## (undated) DEVICE — GAUZE SPONGES,8 PLY: Brand: CURITY

## (undated) DEVICE — 4-PORT MANIFOLD: Brand: NEPTUNE 2

## (undated) DEVICE — 3M™ TEGADERM™ TRANSPARENT FILM DRESSING FRAME STYLE, 1624W, 2-3/8 IN X 2-3/4 IN (6 CM X 7 CM), 100/CT 4CT/CASE: Brand: 3M™ TEGADERM™

## (undated) DEVICE — CUP MEDICINE 1OZ 5000/CS 50/PLT: Brand: MEDEGEN MEDICAL PRODUCTS, LLC

## (undated) DEVICE — CHLORAPREP HI-LITE 26ML ORANGE

## (undated) DEVICE — GLOVE SRG BIOGEL 7.5

## (undated) DEVICE — CHLORHEXIDINE 4PCT 4 OZ

## (undated) DEVICE — FIBER STD QUANTA 1000 MICRON

## (undated) DEVICE — EXIDINE 4 PCT

## (undated) DEVICE — SYRINGE 10ML LL

## (undated) DEVICE — STRL PENROSE DRAIN 18" X 1/4": Brand: CARDINAL HEALTH

## (undated) DEVICE — SCD SEQUENTIAL COMPRESSION COMFORT SLEEVE MEDIUM KNEE LENGTH: Brand: KENDALL SCD

## (undated) DEVICE — BAG URINE DRAINAGE 2000ML ANTI RFLX LF

## (undated) DEVICE — CATH FOLEY 12FR 5ML 2WAY LUBRICATH

## (undated) DEVICE — TUBING SUCTION 5MM X 12 FT

## (undated) DEVICE — UROCATCH BAG

## (undated) DEVICE — SINGLE PORT MANIFOLD: Brand: NEPTUNE 2

## (undated) DEVICE — SPONGE GAUZE 2 X 2 4PLY STRL

## (undated) DEVICE — SUT VICRYL 0 UR-6 27 IN J603H

## (undated) DEVICE — POOLE SUCTION HANDLE W/TUBING: Brand: CARDINAL HEALTH

## (undated) DEVICE — SUT VICRYL 3-0 REEL 54 IN J285G

## (undated) DEVICE — SUT VICRYL 3-0 SH 27 IN J416H

## (undated) DEVICE — INTENDED FOR TISSUE SEPARATION, AND OTHER PROCEDURES THAT REQUIRE A SHARP SURGICAL BLADE TO PUNCTURE OR CUT.: Brand: BARD-PARKER ® CARBON RIB-BACK BLADES

## (undated) DEVICE — BAG DRAINAGE URINARY W TOWER

## (undated) DEVICE — NEEDLE 25G X 1 1/2

## (undated) DEVICE — INLAY OPTIMA URETERAL STENT W/O GUIDEWIRE
Type: IMPLANTABLE DEVICE | Site: URETER | Status: NON-FUNCTIONAL
Brand: BARD® INLAY OPTIMA® URETERAL STENT

## (undated) DEVICE — GLOVE INDICATOR UNDERGLOVE SZ 7.5 GREEN

## (undated) DEVICE — SUT VICRYL 2-0 CP-1 27 IN J266H

## (undated) DEVICE — ANTIBACTERIAL VIOLET BRAIDED (POLYGLACTIN 910), SYNTHETIC ABSORBABLE SUTURE: Brand: COATED VICRYL

## (undated) DEVICE — GAUZE SPONGES,16 PLY: Brand: CURITY

## (undated) DEVICE — BETHLEHEM UNIVERSAL MINOR GEN: Brand: CARDINAL HEALTH

## (undated) DEVICE — ADHESIVE SKIN HIGH VISCOSITY EXOFIN 1ML

## (undated) DEVICE — GUIDEWIRE STRGHT TIP 0.035 IN  SOLO PLUS

## (undated) DEVICE — PLUMEPEN PRO 10FT